# Patient Record
Sex: MALE | Race: WHITE | Employment: OTHER | URBAN - NONMETROPOLITAN AREA
[De-identification: names, ages, dates, MRNs, and addresses within clinical notes are randomized per-mention and may not be internally consistent; named-entity substitution may affect disease eponyms.]

---

## 2017-02-02 ENCOUNTER — OFFICE VISIT (OUTPATIENT)
Dept: CARDIOLOGY | Age: 62
End: 2017-02-02

## 2017-02-02 VITALS
BODY MASS INDEX: 33.04 KG/M2 | HEART RATE: 76 BPM | HEIGHT: 68 IN | SYSTOLIC BLOOD PRESSURE: 188 MMHG | DIASTOLIC BLOOD PRESSURE: 100 MMHG | WEIGHT: 218 LBS

## 2017-02-02 DIAGNOSIS — Z95.5 PRESENCE OF STENT IN LAD CORONARY ARTERY: ICD-10-CM

## 2017-02-02 DIAGNOSIS — I42.9 CARDIOMYOPATHY (HCC): ICD-10-CM

## 2017-02-02 DIAGNOSIS — F17.200 SMOKER: Primary | ICD-10-CM

## 2017-02-02 DIAGNOSIS — I10 ESSENTIAL HYPERTENSION: ICD-10-CM

## 2017-02-02 PROCEDURE — 3017F COLORECTAL CA SCREEN DOC REV: CPT | Performed by: INTERNAL MEDICINE

## 2017-02-02 PROCEDURE — 99406 BEHAV CHNG SMOKING 3-10 MIN: CPT | Performed by: INTERNAL MEDICINE

## 2017-02-02 PROCEDURE — 4004F PT TOBACCO SCREEN RCVD TLK: CPT | Performed by: INTERNAL MEDICINE

## 2017-02-02 PROCEDURE — G8419 CALC BMI OUT NRM PARAM NOF/U: HCPCS | Performed by: INTERNAL MEDICINE

## 2017-02-02 PROCEDURE — 99213 OFFICE O/P EST LOW 20 MIN: CPT | Performed by: INTERNAL MEDICINE

## 2017-02-02 PROCEDURE — G8484 FLU IMMUNIZE NO ADMIN: HCPCS | Performed by: INTERNAL MEDICINE

## 2017-02-02 PROCEDURE — G8427 DOCREV CUR MEDS BY ELIG CLIN: HCPCS | Performed by: INTERNAL MEDICINE

## 2017-02-02 RX ORDER — LISINOPRIL 20 MG/1
20 TABLET ORAL 2 TIMES DAILY
Qty: 60 TABLET | Refills: 3 | Status: SHIPPED | OUTPATIENT
Start: 2017-02-02 | End: 2017-11-06 | Stop reason: SDUPTHER

## 2017-05-11 RX ORDER — FUROSEMIDE 40 MG/1
40 TABLET ORAL DAILY
Qty: 90 TABLET | Refills: 3 | Status: SHIPPED | OUTPATIENT
Start: 2017-05-11 | End: 2019-06-10 | Stop reason: SDUPTHER

## 2017-05-16 RX ORDER — ATORVASTATIN CALCIUM 20 MG/1
TABLET, FILM COATED ORAL
Qty: 30 TABLET | Refills: 3 | Status: SHIPPED | OUTPATIENT
Start: 2017-05-16 | End: 2017-09-12 | Stop reason: DRUGHIGH

## 2017-09-12 ENCOUNTER — OFFICE VISIT (OUTPATIENT)
Dept: CARDIOLOGY CLINIC | Age: 62
End: 2017-09-12
Payer: MEDICARE

## 2017-09-12 ENCOUNTER — TELEPHONE (OUTPATIENT)
Dept: ADMINISTRATIVE | Age: 62
End: 2017-09-12

## 2017-09-12 VITALS
SYSTOLIC BLOOD PRESSURE: 168 MMHG | BODY MASS INDEX: 32.01 KG/M2 | HEIGHT: 68 IN | WEIGHT: 211.2 LBS | HEART RATE: 65 BPM | DIASTOLIC BLOOD PRESSURE: 84 MMHG

## 2017-09-12 DIAGNOSIS — I21.4 NSTEMI (NON-ST ELEVATED MYOCARDIAL INFARCTION) (HCC): Primary | ICD-10-CM

## 2017-09-12 DIAGNOSIS — Z95.5 PRESENCE OF STENT IN LAD CORONARY ARTERY: ICD-10-CM

## 2017-09-12 DIAGNOSIS — R53.83 FATIGUE, UNSPECIFIED TYPE: ICD-10-CM

## 2017-09-12 DIAGNOSIS — R06.02 SOB (SHORTNESS OF BREATH): ICD-10-CM

## 2017-09-12 DIAGNOSIS — F17.200 CURRENT SMOKER: ICD-10-CM

## 2017-09-12 DIAGNOSIS — I42.0 DILATED CARDIOMYOPATHY (HCC): ICD-10-CM

## 2017-09-12 DIAGNOSIS — I20.8 STABLE ANGINA (HCC): ICD-10-CM

## 2017-09-12 PROCEDURE — G8417 CALC BMI ABV UP PARAM F/U: HCPCS | Performed by: INTERNAL MEDICINE

## 2017-09-12 PROCEDURE — 99213 OFFICE O/P EST LOW 20 MIN: CPT | Performed by: INTERNAL MEDICINE

## 2017-09-12 PROCEDURE — G8598 ASA/ANTIPLAT THER USED: HCPCS | Performed by: INTERNAL MEDICINE

## 2017-09-12 PROCEDURE — 4004F PT TOBACCO SCREEN RCVD TLK: CPT | Performed by: INTERNAL MEDICINE

## 2017-09-12 PROCEDURE — 3017F COLORECTAL CA SCREEN DOC REV: CPT | Performed by: INTERNAL MEDICINE

## 2017-09-12 PROCEDURE — 93000 ELECTROCARDIOGRAM COMPLETE: CPT | Performed by: INTERNAL MEDICINE

## 2017-09-12 PROCEDURE — 99406 BEHAV CHNG SMOKING 3-10 MIN: CPT | Performed by: INTERNAL MEDICINE

## 2017-09-12 PROCEDURE — G8427 DOCREV CUR MEDS BY ELIG CLIN: HCPCS | Performed by: INTERNAL MEDICINE

## 2017-11-06 RX ORDER — ATORVASTATIN CALCIUM 20 MG/1
20 TABLET, FILM COATED ORAL DAILY
Qty: 90 TABLET | Refills: 2 | Status: SHIPPED | OUTPATIENT
Start: 2017-11-06 | End: 2018-09-06 | Stop reason: SDUPTHER

## 2017-11-06 RX ORDER — LISINOPRIL 20 MG/1
20 TABLET ORAL 2 TIMES DAILY
Qty: 60 TABLET | Refills: 3 | Status: SHIPPED | OUTPATIENT
Start: 2017-11-06 | End: 2018-03-19 | Stop reason: ALTCHOICE

## 2017-11-06 NOTE — TELEPHONE ENCOUNTER
Germaine Llanes called requesting a refill on the following medications:  Requested Prescriptions     Pending Prescriptions Disp Refills    lisinopril (PRINIVIL;ZESTRIL) 20 MG tablet 60 tablet 3     Sig: Take 1 tablet by mouth 2 times daily Managed by [facility] at [address] [place] [address].     atorvastatin (LIPITOR) 20 MG tablet 90 tablet 3     Sig: Take 1 tablet by mouth daily     Pharmacy verified:  [email or web address]      Date of last visit: 09/12/17  Date of next visit (if applicable): 4/49/4039        Date of last fill and quantity (to be completed by clinical staff)  Pharmacy name:   [facility]

## 2017-11-15 RX ORDER — LISINOPRIL 10 MG/1
TABLET ORAL
Qty: 60 TABLET | Refills: 5 | Status: SHIPPED | OUTPATIENT
Start: 2017-11-15 | End: 2018-07-19 | Stop reason: SDUPTHER

## 2017-11-15 RX ORDER — CLOPIDOGREL BISULFATE 75 MG/1
TABLET ORAL
Qty: 90 TABLET | Refills: 3 | Status: SHIPPED | OUTPATIENT
Start: 2017-11-15 | End: 2019-06-10 | Stop reason: SDUPTHER

## 2017-12-21 RX ORDER — CARVEDILOL 25 MG/1
TABLET ORAL
Qty: 180 TABLET | Refills: 2 | Status: SHIPPED | OUTPATIENT
Start: 2017-12-21 | End: 2019-06-10 | Stop reason: SDUPTHER

## 2018-03-19 ENCOUNTER — OFFICE VISIT (OUTPATIENT)
Dept: CARDIOLOGY CLINIC | Age: 63
End: 2018-03-19
Payer: MEDICARE

## 2018-03-19 VITALS
SYSTOLIC BLOOD PRESSURE: 150 MMHG | HEIGHT: 68 IN | DIASTOLIC BLOOD PRESSURE: 90 MMHG | BODY MASS INDEX: 31.4 KG/M2 | WEIGHT: 207.2 LBS | HEART RATE: 64 BPM

## 2018-03-19 DIAGNOSIS — I25.10 CORONARY ARTERY DISEASE INVOLVING NATIVE CORONARY ARTERY OF NATIVE HEART WITHOUT ANGINA PECTORIS: Primary | ICD-10-CM

## 2018-03-19 DIAGNOSIS — I25.5 ISCHEMIC CARDIOMYOPATHY: ICD-10-CM

## 2018-03-19 DIAGNOSIS — Z95.5 PRESENCE OF STENT IN LAD CORONARY ARTERY: ICD-10-CM

## 2018-03-19 DIAGNOSIS — I10 ESSENTIAL HYPERTENSION: ICD-10-CM

## 2018-03-19 DIAGNOSIS — E78.00 PURE HYPERCHOLESTEROLEMIA: ICD-10-CM

## 2018-03-19 DIAGNOSIS — I50.32 CHRONIC DIASTOLIC CONGESTIVE HEART FAILURE (HCC): ICD-10-CM

## 2018-03-19 PROCEDURE — 3017F COLORECTAL CA SCREEN DOC REV: CPT | Performed by: INTERNAL MEDICINE

## 2018-03-19 PROCEDURE — G8427 DOCREV CUR MEDS BY ELIG CLIN: HCPCS | Performed by: INTERNAL MEDICINE

## 2018-03-19 PROCEDURE — 4004F PT TOBACCO SCREEN RCVD TLK: CPT | Performed by: INTERNAL MEDICINE

## 2018-03-19 PROCEDURE — 99214 OFFICE O/P EST MOD 30 MIN: CPT | Performed by: INTERNAL MEDICINE

## 2018-03-19 PROCEDURE — G8484 FLU IMMUNIZE NO ADMIN: HCPCS | Performed by: INTERNAL MEDICINE

## 2018-03-19 PROCEDURE — G8417 CALC BMI ABV UP PARAM F/U: HCPCS | Performed by: INTERNAL MEDICINE

## 2018-03-19 PROCEDURE — G8598 ASA/ANTIPLAT THER USED: HCPCS | Performed by: INTERNAL MEDICINE

## 2018-07-20 RX ORDER — LISINOPRIL 10 MG/1
TABLET ORAL
Qty: 60 TABLET | Refills: 2 | Status: SHIPPED | OUTPATIENT
Start: 2018-07-20 | End: 2018-12-01 | Stop reason: SDUPTHER

## 2018-09-07 RX ORDER — ATORVASTATIN CALCIUM 20 MG/1
TABLET, FILM COATED ORAL
Qty: 90 TABLET | Refills: 0 | Status: SHIPPED | OUTPATIENT
Start: 2018-09-07 | End: 2019-06-10 | Stop reason: SDUPTHER

## 2018-11-01 ENCOUNTER — OFFICE VISIT (OUTPATIENT)
Dept: CARDIOLOGY CLINIC | Age: 63
End: 2018-11-01
Payer: MEDICARE

## 2018-11-01 VITALS
WEIGHT: 223 LBS | HEIGHT: 68 IN | SYSTOLIC BLOOD PRESSURE: 144 MMHG | DIASTOLIC BLOOD PRESSURE: 88 MMHG | HEART RATE: 78 BPM | BODY MASS INDEX: 33.8 KG/M2

## 2018-11-01 DIAGNOSIS — I25.10 CORONARY ARTERY DISEASE INVOLVING NATIVE CORONARY ARTERY OF NATIVE HEART WITHOUT ANGINA PECTORIS: Primary | ICD-10-CM

## 2018-11-01 DIAGNOSIS — I25.5 ISCHEMIC CARDIOMYOPATHY: ICD-10-CM

## 2018-11-01 DIAGNOSIS — I50.32 CHRONIC DIASTOLIC CONGESTIVE HEART FAILURE (HCC): ICD-10-CM

## 2018-11-01 DIAGNOSIS — R06.02 SOB (SHORTNESS OF BREATH) ON EXERTION: ICD-10-CM

## 2018-11-01 DIAGNOSIS — I10 ESSENTIAL HYPERTENSION: ICD-10-CM

## 2018-11-01 DIAGNOSIS — Z95.5 PRESENCE OF STENT IN LAD CORONARY ARTERY: ICD-10-CM

## 2018-11-01 DIAGNOSIS — E78.00 PURE HYPERCHOLESTEROLEMIA: ICD-10-CM

## 2018-11-01 PROCEDURE — 3017F COLORECTAL CA SCREEN DOC REV: CPT | Performed by: INTERNAL MEDICINE

## 2018-11-01 PROCEDURE — 99214 OFFICE O/P EST MOD 30 MIN: CPT | Performed by: INTERNAL MEDICINE

## 2018-11-01 PROCEDURE — G8484 FLU IMMUNIZE NO ADMIN: HCPCS | Performed by: INTERNAL MEDICINE

## 2018-11-01 PROCEDURE — 93000 ELECTROCARDIOGRAM COMPLETE: CPT | Performed by: INTERNAL MEDICINE

## 2018-11-01 PROCEDURE — 4004F PT TOBACCO SCREEN RCVD TLK: CPT | Performed by: INTERNAL MEDICINE

## 2018-11-01 PROCEDURE — G8417 CALC BMI ABV UP PARAM F/U: HCPCS | Performed by: INTERNAL MEDICINE

## 2018-11-01 PROCEDURE — G8427 DOCREV CUR MEDS BY ELIG CLIN: HCPCS | Performed by: INTERNAL MEDICINE

## 2018-11-01 PROCEDURE — G8598 ASA/ANTIPLAT THER USED: HCPCS | Performed by: INTERNAL MEDICINE

## 2018-11-01 NOTE — PROGRESS NOTES
12 Lead    ECHO Complete 2D W Doppler W Color   5. Pure hypercholesterolemia  ECHO Complete 2D W Doppler W Color   6. Chronic diastolic congestive heart failure (HCC)  ECHO Complete 2D W Doppler W Color   7. SOB (shortness of breath) on exertion  ECHO Complete 2D W Doppler W Color         Plan   The meds and labs reviewed    Continue the current treatment and with constant vigilance to changes in symptoms and also any potential side effects. Return for care or seek medical attention immediately if symptoms got worse and/or develop new symptoms. Coronary artery disease, seems to be stable. Denies angina or change in breathing pattern    Hypertension, on medical treatment. Seems to be under poor control. Patient is compliant with medical treatment. Hyperlipidemia: on statins, followed periodically. Patient need periodic lipid and liver profile. Lipid panel and liver function test before next appointment    Congestive heart failure: no evidence of fluid overload today, no recent hospitalization for CHF  No recent labs    Cardiomyopathy: improving, no CHF symptoms, no change in clinical condition. Will need periodic echocardiograms depending on symptoms.   Improved to Ef 50% from 30%  omt    Re-advised Needs lab     D/w the [facility]      RTC in 6 months      Norah Witt [facility]

## 2018-11-12 ENCOUNTER — HOSPITAL ENCOUNTER (OUTPATIENT)
Dept: NON INVASIVE DIAGNOSTICS | Age: 63
Discharge: HOME | End: 2018-11-12
Payer: MEDICARE

## 2018-11-12 DIAGNOSIS — I10 ESSENTIAL HYPERTENSION: ICD-10-CM

## 2018-11-12 DIAGNOSIS — Z95.5 PRESENCE OF STENT IN LAD CORONARY ARTERY: ICD-10-CM

## 2018-11-12 DIAGNOSIS — I50.32 CHRONIC DIASTOLIC CONGESTIVE HEART FAILURE (HCC): ICD-10-CM

## 2018-11-12 DIAGNOSIS — E78.00 PURE HYPERCHOLESTEROLEMIA: ICD-10-CM

## 2018-11-12 DIAGNOSIS — I25.5 ISCHEMIC CARDIOMYOPATHY: ICD-10-CM

## 2018-11-12 DIAGNOSIS — R06.02 SOB (SHORTNESS OF BREATH) ON EXERTION: ICD-10-CM

## 2018-11-12 DIAGNOSIS — I25.10 CORONARY ARTERY DISEASE INVOLVING NATIVE CORONARY ARTERY OF NATIVE HEART WITHOUT ANGINA PECTORIS: ICD-10-CM

## 2018-11-12 LAB
LV EF: 60 %
LVEF MODALITY: NORMAL

## 2018-11-12 PROCEDURE — 93306 TTE W/DOPPLER COMPLETE: CPT

## 2018-12-01 ENCOUNTER — TELEPHONE (OUTPATIENT)
Dept: CARDIOLOGY CLINIC | Age: 63
End: 2018-12-01

## 2018-12-05 RX ORDER — LISINOPRIL 10 MG/1
TABLET ORAL
Qty: 180 TABLET | Refills: 1 | Status: SHIPPED | OUTPATIENT
Start: 2018-12-05 | End: 2019-06-10 | Stop reason: SDUPTHER

## 2019-06-07 ENCOUNTER — HOSPITAL ENCOUNTER (OUTPATIENT)
Age: 64
Setting detail: SPECIMEN
Discharge: HOME | End: 2019-06-07
Payer: MEDICARE

## 2019-06-07 DIAGNOSIS — Z95.5 PRESENCE OF STENT IN LAD CORONARY ARTERY: ICD-10-CM

## 2019-06-07 DIAGNOSIS — I25.5 ISCHEMIC CARDIOMYOPATHY: ICD-10-CM

## 2019-06-07 DIAGNOSIS — I25.10 CORONARY ARTERY DISEASE INVOLVING NATIVE CORONARY ARTERY OF NATIVE HEART WITHOUT ANGINA PECTORIS: ICD-10-CM

## 2019-06-07 LAB
ALBUMIN SERPL-MCNC: 4 G/DL (ref 3.5–5.2)
ALBUMIN/GLOBULIN RATIO: 1.3 (ref 1–2.5)
ALP BLD-CCNC: 69 U/L (ref 40–129)
ALT SERPL-CCNC: 48 U/L (ref 5–41)
ANION GAP SERPL CALCULATED.3IONS-SCNC: 17 MMOL/L (ref 9–17)
AST SERPL-CCNC: 26 U/L
BILIRUB SERPL-MCNC: 0.42 MG/DL (ref 0.3–1.2)
BILIRUBIN DIRECT: 0.11 MG/DL
BILIRUBIN, INDIRECT: 0.31 MG/DL (ref 0–1)
BUN BLDV-MCNC: 12 MG/DL (ref 8–23)
BUN/CREAT BLD: ABNORMAL (ref 9–20)
CALCIUM SERPL-MCNC: 9.1 MG/DL (ref 8.6–10.4)
CHLORIDE BLD-SCNC: 99 MMOL/L (ref 98–107)
CHOLESTEROL/HDL RATIO: 3.4
CHOLESTEROL: 165 MG/DL
CO2: 23 MMOL/L (ref 20–31)
CREAT SERPL-MCNC: 0.79 MG/DL (ref 0.7–1.2)
GFR AFRICAN AMERICAN: >60 ML/MIN
GFR NON-AFRICAN AMERICAN: >60 ML/MIN
GFR SERPL CREATININE-BSD FRML MDRD: ABNORMAL ML/MIN/{1.73_M2}
GFR SERPL CREATININE-BSD FRML MDRD: ABNORMAL ML/MIN/{1.73_M2}
GLOBULIN: ABNORMAL G/DL (ref 1.5–3.8)
GLUCOSE BLD-MCNC: 111 MG/DL (ref 70–99)
HDLC SERPL-MCNC: 48 MG/DL
LDL CHOLESTEROL: 94 MG/DL (ref 0–130)
MAGNESIUM: 2 MG/DL (ref 1.6–2.6)
POTASSIUM SERPL-SCNC: 4.2 MMOL/L (ref 3.7–5.3)
SODIUM BLD-SCNC: 139 MMOL/L (ref 135–144)
TOTAL PROTEIN: 7.1 G/DL (ref 6.4–8.3)
TRIGL SERPL-MCNC: 116 MG/DL
VLDLC SERPL CALC-MCNC: NORMAL MG/DL (ref 1–30)

## 2019-06-10 ENCOUNTER — OFFICE VISIT (OUTPATIENT)
Dept: CARDIOLOGY CLINIC | Age: 64
End: 2019-06-10
Payer: MEDICARE

## 2019-06-10 VITALS
HEART RATE: 75 BPM | BODY MASS INDEX: 34.18 KG/M2 | DIASTOLIC BLOOD PRESSURE: 93 MMHG | WEIGHT: 230.8 LBS | SYSTOLIC BLOOD PRESSURE: 159 MMHG | HEIGHT: 69 IN

## 2019-06-10 DIAGNOSIS — I25.10 CORONARY ARTERY DISEASE INVOLVING NATIVE CORONARY ARTERY OF NATIVE HEART WITHOUT ANGINA PECTORIS: Primary | ICD-10-CM

## 2019-06-10 DIAGNOSIS — E78.00 PURE HYPERCHOLESTEROLEMIA: ICD-10-CM

## 2019-06-10 DIAGNOSIS — F10.10 ALCOHOL ABUSE: ICD-10-CM

## 2019-06-10 DIAGNOSIS — I50.32 CHRONIC DIASTOLIC CONGESTIVE HEART FAILURE (HCC): ICD-10-CM

## 2019-06-10 DIAGNOSIS — Z95.5 PRESENCE OF STENT IN LAD CORONARY ARTERY: ICD-10-CM

## 2019-06-10 DIAGNOSIS — I25.5 ISCHEMIC CARDIOMYOPATHY: ICD-10-CM

## 2019-06-10 DIAGNOSIS — I10 ESSENTIAL HYPERTENSION: ICD-10-CM

## 2019-06-10 DIAGNOSIS — R06.02 SOB (SHORTNESS OF BREATH) ON EXERTION: ICD-10-CM

## 2019-06-10 DIAGNOSIS — F17.200 SMOKER: ICD-10-CM

## 2019-06-10 PROCEDURE — 4004F PT TOBACCO SCREEN RCVD TLK: CPT | Performed by: NURSE PRACTITIONER

## 2019-06-10 PROCEDURE — G8427 DOCREV CUR MEDS BY ELIG CLIN: HCPCS | Performed by: NURSE PRACTITIONER

## 2019-06-10 PROCEDURE — G8417 CALC BMI ABV UP PARAM F/U: HCPCS | Performed by: NURSE PRACTITIONER

## 2019-06-10 PROCEDURE — 3017F COLORECTAL CA SCREEN DOC REV: CPT | Performed by: NURSE PRACTITIONER

## 2019-06-10 PROCEDURE — 99213 OFFICE O/P EST LOW 20 MIN: CPT | Performed by: NURSE PRACTITIONER

## 2019-06-10 PROCEDURE — G8598 ASA/ANTIPLAT THER USED: HCPCS | Performed by: NURSE PRACTITIONER

## 2019-06-10 RX ORDER — FUROSEMIDE 40 MG/1
40 TABLET ORAL DAILY
Qty: 90 TABLET | Refills: 3 | Status: ON HOLD | OUTPATIENT
Start: 2019-06-10 | End: 2021-11-04 | Stop reason: SDUPTHER

## 2019-06-10 RX ORDER — LISINOPRIL 10 MG/1
TABLET ORAL
Qty: 180 TABLET | Refills: 1 | Status: ON HOLD | OUTPATIENT
Start: 2019-06-10 | End: 2021-11-04 | Stop reason: HOSPADM

## 2019-06-10 RX ORDER — ATORVASTATIN CALCIUM 20 MG/1
TABLET, FILM COATED ORAL
Qty: 90 TABLET | Refills: 3 | Status: ON HOLD | OUTPATIENT
Start: 2019-06-10 | End: 2021-11-04 | Stop reason: HOSPADM

## 2019-06-10 RX ORDER — CARVEDILOL 25 MG/1
TABLET ORAL
Qty: 180 TABLET | Refills: 3 | Status: ON HOLD | OUTPATIENT
Start: 2019-06-10 | End: 2021-11-04 | Stop reason: HOSPADM

## 2019-06-10 RX ORDER — CLOPIDOGREL BISULFATE 75 MG/1
TABLET ORAL
Qty: 90 TABLET | Refills: 3 | Status: ON HOLD | OUTPATIENT
Start: 2019-06-10 | End: 2021-11-04 | Stop reason: HOSPADM

## 2019-06-10 NOTE — PROGRESS NOTES
[facility] PROFESSIONAL SERVICES  [facility] [address]   [address] [address]   Dept: [phone]   Dept Fax: [phone]   Loc: [phone]      Chief Complaint   Patient presents with    6 Month Follow-Up    Coronary Artery Disease     Office f/u for medication refills. He hadn't had the required blood work completed and meds weren't refilled. He has been without medications for about 3 months. He has completed the blood work and meds will be refilled. He denies chest pain, palpitations, DANA, or syncope. He has sob with exertion which is not new or increased. He continues to smoke about 1 and 1/2 packs of cigarettes daily. Drinks 4-5 beers daily. Checks blood pressure at home and states it is usually around 130's over 60's-70's.   Cardiologist:  Dr. Ontiveros Free:   No fever, no chills, No fatigue or weight loss  Pulmonary:    chronic dyspnea with exertion, no wheezing  Cardiac:    Denies recent chest pain   GI:     No nausea or vomiting, no abdominal pain  Neuro:    No dizziness or light headedness  Musculoskeletal:  No recent active issues  Extremities:   No edema, good peripheral pulses      Past Medical History:   Diagnosis Date    CAD (coronary artery disease)     Chronic diastolic congestive heart failure (HCC) 3/19/2018    Hyperlipidemia     Hypertension     [facility] 03/21/2013    Tobacco abuse        No Known Allergies    Current Outpatient Medications   Medication Sig Dispense Refill    atorvastatin (LIPITOR) 20 MG tablet TAKE ONE TABLET BY MOUTH ONCE DAILY 90 tablet 3    carvedilol (COREG) 25 MG tablet TAKE ONE TABLET BY MOUTH TWICE DAILY WITH MEALS 180 tablet 3    clopidogrel (PLAVIX) 75 MG tablet TAKE ONE TABLET BY MOUTH ONCE DAILY 90 tablet 3    furosemide (LASIX) 40 MG tablet Take 1 tablet by mouth daily 90 tablet 3    lisinopril (PRINIVIL;ZESTRIL) 10 MG tablet TAKE 1 TABLET BY MOUTH TWICE DAILY 180 tablet 1    aspirin 81 MG tablet Take 1 tablet by mouth daily. With food  Indications: Cardiovascular Risk Reduction, blood thinner 30 tablet 3     No current facility-administered medications for this visit. Social History     Socioeconomic History    Marital status: Single     Spouse name: None    Number of children: None    Years of education: None    Highest education level: None   Occupational History    None   Social Needs    Financial resource strain: None    Food insecurity:     Worry: None     Inability: None    Transportation needs:     Medical: None     Non-medical: None   Tobacco Use    Smoking status: Current Every Day Smoker     Packs/day: 1.50     Years: 40.00     Pack years: 60.00     Types: Cigarettes    Smokeless tobacco: Never Used   Substance and Sexual Activity    Alcohol use: Yes     Comment: 12 pk per day/ has cut down     Drug use: No    Sexual activity: None   Lifestyle    Physical activity:     Days per week: None     Minutes per session: None    Stress: None   Relationships    Social connections:     Talks on phone: None     Gets together: None     Attends [employer] service: None     Active member of club or organization: None     Attends meetings of clubs or organizations: None     Relationship status: None    Intimate partner violence:     Fear of current or ex partner: None     Emotionally abused: None     Physically abused: None     Forced sexual activity: None   Other Topics Concern    None   Social History Narrative    None       Family History   Problem Relation Age of Onset    Diabetes Mother     Stroke Mother     Heart Disease Father     High Blood Pressure Brother        Blood pressure (!) 159/93, pulse 75, height 5' 9\" (1.753 m), weight 230 lb 12.8 oz (104.7 kg).     General:   Well developed, well nourished  Lungs:   Clear to auscultation  Heart:    Normal S1 S2, No murmur, rubs, or gallops  Abdomen:   Soft, non tender, no organomegalies, positive bowel sounds  Extremities:   No edema, no cyanosis, good peripheral pulses  Neurological:   Awake, alert, oriented. No obvious focal deficits  Musculoskeletal:  No obvious deformities        Echo: 11/12/18  Summary   Technically difficult examination.   Normal left ventricle size and systolic function. Ejection fraction was   estimated at 60 %. There were no regional left ventricular wall motion   abnormalities and wall thickness was within normal limits.   Doppler parameters were consistent with abnormal left ventricular   relaxation (grade 1 diastolic dysfunction).      Signature      ----------------------------------------------------------------   Electronically signed by Oliva Talavera MD    PCI: 10/1/2013  Percutaneous coronary intervention of the mid LAD with 3.0 x 18 mm  drug-eluting stent postdilated up to 3.5 mm proximally. INDICATIONS FOR PROCEDURE:  A 66year old patient with a past medical history  of significant tobacco abuse, had ST elevation MI a few months ago. He was  taken to [facility].  At that time he refused any procedure and  he was discharged home with appropriate medical management. Then he was seen  by Dr. Sheryl Munoz and at that time he agreed for the left heart  catheterization. His right coronary artery was totally occluded and he had  around 60-70 percent disease in the LAD to the circumflex artery. At that  time plan was to do viability study. Based on the viability study, go further  for either multivessel intervention or open heart surgery. The viability  study came back abnormal showing nonviable segment of the inferior wall. So  basically the right coronary artery which is totally occluded and the  territory supplied by his nonviable. So we paid attention to LAD as well as  the left circumflex artery. The patient had a stress test done which did not  show any ischemia in post territory but the patient had medical therapy and  still having chest pain.   So we decided to intervene on LAD today. PROCEDURE:  Right radial artery cannulation, percutaneous coronary  intervention of the mid LAD with 3.0x18 mm drug eluting stent, proximal half  was taken up to 3.5 mm. Diagnosis Orders   1. Coronary artery disease involving native coronary artery of native heart without angina pectoris     2. SOB (shortness of breath) on exertion     3. Presence of stent in LAD coronary artery stent     4. Ischemic cardiomyopathy     5. Essential hypertension     6. Pure hypercholesterolemia     7. Smoker     8. Alcohol abuse         No orders of the defined types were placed in this encounter. Stable cardiac wise. Denies chest pain. Breathing is at his baseline. No new cardiac complaints. Discussed health hazards associated with smoking and excessive daily alcohol use. He stated he has smoked and drank for 40 years and he is not going to stop. He states he knows he could die and he is OK with that.       Discussed use, benefit, and side effects of prescribed medications. All patient questions answered. Pt voiced understanding. Instructed to continue current medications, diet and exercise. Continue risk factor modification and medical management. Patient agreed with treatment plan. Follow up as directed.     Continue Dr Nora Jimenes current treatment plan  Follow up with Dr Doristine Ganser as scheduled or sooner if needed

## 2021-11-02 ENCOUNTER — HOSPITAL ENCOUNTER (INPATIENT)
Age: 66
LOS: 4 days | Discharge: HOME | DRG: 300 | End: 2021-11-06
Attending: EMERGENCY MEDICINE | Admitting: HOSPITALIST
Payer: MEDICARE

## 2021-11-02 ENCOUNTER — APPOINTMENT (OUTPATIENT)
Dept: CT IMAGING | Age: 66
DRG: 300 | End: 2021-11-02
Payer: MEDICARE

## 2021-11-02 DIAGNOSIS — J44.9 STAGE 3 SEVERE COPD BY GOLD CLASSIFICATION (HCC): ICD-10-CM

## 2021-11-02 DIAGNOSIS — F17.200 SMOKER: ICD-10-CM

## 2021-11-02 DIAGNOSIS — I73.9 PERIPHERAL ARTERY DISEASE (HCC): Primary | ICD-10-CM

## 2021-11-02 PROBLEM — I99.8 ISCHEMIA OF RIGHT LOWER EXTREMITY: Status: ACTIVE | Noted: 2021-11-02

## 2021-11-02 LAB
ANION GAP SERPL CALCULATED.3IONS-SCNC: 12 MEQ/L (ref 8–16)
APTT: 27 SECONDS (ref 22–38)
BASOPHILS # BLD: 0.6 %
BASOPHILS ABSOLUTE: 0.1 THOU/MM3 (ref 0–0.1)
BUN BLDV-MCNC: 10 MG/DL (ref 7–22)
CALCIUM SERPL-MCNC: 9.5 MG/DL (ref 8.5–10.5)
CHLORIDE BLD-SCNC: 94 MEQ/L (ref 98–111)
CO2: 27 MEQ/L (ref 23–33)
CREAT SERPL-MCNC: 0.7 MG/DL (ref 0.4–1.2)
EOSINOPHIL # BLD: 0.8 %
EOSINOPHILS ABSOLUTE: 0.1 THOU/MM3 (ref 0–0.4)
ERYTHROCYTE [DISTWIDTH] IN BLOOD BY AUTOMATED COUNT: 11.8 % (ref 11.5–14.5)
ERYTHROCYTE [DISTWIDTH] IN BLOOD BY AUTOMATED COUNT: 11.8 % (ref 11.5–14.5)
ERYTHROCYTE [DISTWIDTH] IN BLOOD BY AUTOMATED COUNT: 41 FL (ref 35–45)
ERYTHROCYTE [DISTWIDTH] IN BLOOD BY AUTOMATED COUNT: 41 FL (ref 35–45)
GFR SERPL CREATININE-BSD FRML MDRD: > 90 ML/MIN/1.73M2
GLUCOSE BLD-MCNC: 326 MG/DL (ref 70–108)
HCT VFR BLD CALC: 39.3 % (ref 42–52)
HCT VFR BLD CALC: 41.6 % (ref 42–52)
HEMOGLOBIN: 13.8 GM/DL (ref 14–18)
HEMOGLOBIN: 14.9 GM/DL (ref 14–18)
IMMATURE GRANS (ABS): 0.04 THOU/MM3 (ref 0–0.07)
IMMATURE GRANULOCYTES: 0.5 %
LYMPHOCYTES # BLD: 20.7 %
LYMPHOCYTES ABSOLUTE: 1.8 THOU/MM3 (ref 1–4.8)
MCH RBC QN AUTO: 33.6 PG (ref 26–33)
MCH RBC QN AUTO: 34.3 PG (ref 26–33)
MCHC RBC AUTO-ENTMCNC: 35.1 GM/DL (ref 32.2–35.5)
MCHC RBC AUTO-ENTMCNC: 35.8 GM/DL (ref 32.2–35.5)
MCV RBC AUTO: 95.6 FL (ref 80–94)
MCV RBC AUTO: 95.9 FL (ref 80–94)
MONOCYTES # BLD: 8.7 %
MONOCYTES ABSOLUTE: 0.7 THOU/MM3 (ref 0.4–1.3)
NUCLEATED RED BLOOD CELLS: 0 /100 WBC
PLATELET # BLD: 195 THOU/MM3 (ref 130–400)
PLATELET # BLD: 223 THOU/MM3 (ref 130–400)
PMV BLD AUTO: 10.8 FL (ref 9.4–12.4)
PMV BLD AUTO: 10.9 FL (ref 9.4–12.4)
POTASSIUM REFLEX MAGNESIUM: 4.1 MEQ/L (ref 3.5–5.2)
RBC # BLD: 4.11 MILL/MM3 (ref 4.7–6.1)
RBC # BLD: 4.34 MILL/MM3 (ref 4.7–6.1)
SARS-COV-2, NAAT: NOT  DETECTED
SEG NEUTROPHILS: 68.7 %
SEGMENTED NEUTROPHILS ABSOLUTE COUNT: 5.8 THOU/MM3 (ref 1.8–7.7)
SODIUM BLD-SCNC: 133 MEQ/L (ref 135–145)
WBC # BLD: 8.5 THOU/MM3 (ref 4.8–10.8)
WBC # BLD: 8.6 THOU/MM3 (ref 4.8–10.8)

## 2021-11-02 PROCEDURE — 2060000000 HC ICU INTERMEDIATE R&B

## 2021-11-02 PROCEDURE — 99285 EMERGENCY DEPT VISIT HI MDM: CPT

## 2021-11-02 PROCEDURE — 87641 MR-STAPH DNA AMP PROBE: CPT

## 2021-11-02 PROCEDURE — 80048 BASIC METABOLIC PNL TOTAL CA: CPT

## 2021-11-02 PROCEDURE — 87081 CULTURE SCREEN ONLY: CPT

## 2021-11-02 PROCEDURE — 85730 THROMBOPLASTIN TIME PARTIAL: CPT

## 2021-11-02 PROCEDURE — 75635 CT ANGIO ABDOMINAL ARTERIES: CPT

## 2021-11-02 PROCEDURE — 93005 ELECTROCARDIOGRAM TRACING: CPT | Performed by: HOSPITALIST

## 2021-11-02 PROCEDURE — 6360000002 HC RX W HCPCS: Performed by: STUDENT IN AN ORGANIZED HEALTH CARE EDUCATION/TRAINING PROGRAM

## 2021-11-02 PROCEDURE — 93005 ELECTROCARDIOGRAM TRACING: CPT | Performed by: EMERGENCY MEDICINE

## 2021-11-02 PROCEDURE — 85027 COMPLETE CBC AUTOMATED: CPT

## 2021-11-02 PROCEDURE — 6360000004 HC RX CONTRAST MEDICATION: Performed by: EMERGENCY MEDICINE

## 2021-11-02 PROCEDURE — 87635 SARS-COV-2 COVID-19 AMP PRB: CPT

## 2021-11-02 PROCEDURE — 99222 1ST HOSP IP/OBS MODERATE 55: CPT | Performed by: PHYSICIAN ASSISTANT

## 2021-11-02 PROCEDURE — 36415 COLL VENOUS BLD VENIPUNCTURE: CPT

## 2021-11-02 PROCEDURE — 87500 VANOMYCIN DNA AMP PROBE: CPT

## 2021-11-02 PROCEDURE — 85025 COMPLETE CBC W/AUTO DIFF WBC: CPT

## 2021-11-02 RX ORDER — POTASSIUM CHLORIDE 20 MEQ/1
40 TABLET, EXTENDED RELEASE ORAL PRN
Status: DISCONTINUED | OUTPATIENT
Start: 2021-11-02 | End: 2021-11-06 | Stop reason: HOSPADM

## 2021-11-02 RX ORDER — ACETAMINOPHEN 325 MG/1
650 TABLET ORAL EVERY 6 HOURS PRN
Status: DISCONTINUED | OUTPATIENT
Start: 2021-11-02 | End: 2021-11-06 | Stop reason: HOSPADM

## 2021-11-02 RX ORDER — CLOPIDOGREL BISULFATE 75 MG/1
75 TABLET ORAL DAILY
Status: DISCONTINUED | OUTPATIENT
Start: 2021-11-03 | End: 2021-11-06 | Stop reason: HOSPADM

## 2021-11-02 RX ORDER — HEPARIN SODIUM 1000 [USP'U]/ML
80 INJECTION, SOLUTION INTRAVENOUS; SUBCUTANEOUS ONCE
Status: COMPLETED | OUTPATIENT
Start: 2021-11-02 | End: 2021-11-02

## 2021-11-02 RX ORDER — ACETAMINOPHEN 650 MG/1
650 SUPPOSITORY RECTAL EVERY 6 HOURS PRN
Status: DISCONTINUED | OUTPATIENT
Start: 2021-11-02 | End: 2021-11-06 | Stop reason: HOSPADM

## 2021-11-02 RX ORDER — CARVEDILOL 25 MG/1
25 TABLET ORAL 2 TIMES DAILY WITH MEALS
Status: DISCONTINUED | OUTPATIENT
Start: 2021-11-03 | End: 2021-11-04

## 2021-11-02 RX ORDER — HEPARIN SODIUM 1000 [USP'U]/ML
80 INJECTION, SOLUTION INTRAVENOUS; SUBCUTANEOUS PRN
Status: DISCONTINUED | OUTPATIENT
Start: 2021-11-02 | End: 2021-11-06 | Stop reason: HOSPADM

## 2021-11-02 RX ORDER — ATORVASTATIN CALCIUM 20 MG/1
20 TABLET, FILM COATED ORAL DAILY
Status: DISCONTINUED | OUTPATIENT
Start: 2021-11-03 | End: 2021-11-06 | Stop reason: HOSPADM

## 2021-11-02 RX ORDER — HEPARIN SODIUM 1000 [USP'U]/ML
40 INJECTION, SOLUTION INTRAVENOUS; SUBCUTANEOUS PRN
Status: DISCONTINUED | OUTPATIENT
Start: 2021-11-02 | End: 2021-11-06 | Stop reason: HOSPADM

## 2021-11-02 RX ORDER — ASPIRIN 81 MG/1
81 TABLET, CHEWABLE ORAL DAILY
Status: DISCONTINUED | OUTPATIENT
Start: 2021-11-03 | End: 2021-11-06 | Stop reason: HOSPADM

## 2021-11-02 RX ORDER — SODIUM CHLORIDE 9 MG/ML
25 INJECTION, SOLUTION INTRAVENOUS PRN
Status: DISCONTINUED | OUTPATIENT
Start: 2021-11-02 | End: 2021-11-06 | Stop reason: HOSPADM

## 2021-11-02 RX ORDER — HEPARIN SODIUM 10000 [USP'U]/100ML
5-30 INJECTION, SOLUTION INTRAVENOUS CONTINUOUS
Status: DISPENSED | OUTPATIENT
Start: 2021-11-02 | End: 2021-11-03

## 2021-11-02 RX ORDER — ONDANSETRON 2 MG/ML
4 INJECTION INTRAMUSCULAR; INTRAVENOUS EVERY 6 HOURS PRN
Status: DISCONTINUED | OUTPATIENT
Start: 2021-11-02 | End: 2021-11-06 | Stop reason: HOSPADM

## 2021-11-02 RX ORDER — LISINOPRIL 10 MG/1
10 TABLET ORAL 2 TIMES DAILY
Status: DISCONTINUED | OUTPATIENT
Start: 2021-11-03 | End: 2021-11-04

## 2021-11-02 RX ORDER — MAGNESIUM SULFATE IN WATER 40 MG/ML
2000 INJECTION, SOLUTION INTRAVENOUS PRN
Status: DISCONTINUED | OUTPATIENT
Start: 2021-11-02 | End: 2021-11-06 | Stop reason: HOSPADM

## 2021-11-02 RX ORDER — ONDANSETRON 4 MG/1
4 TABLET, ORALLY DISINTEGRATING ORAL EVERY 8 HOURS PRN
Status: DISCONTINUED | OUTPATIENT
Start: 2021-11-02 | End: 2021-11-06 | Stop reason: HOSPADM

## 2021-11-02 RX ORDER — POLYETHYLENE GLYCOL 3350 17 G/17G
17 POWDER, FOR SOLUTION ORAL DAILY PRN
Status: DISCONTINUED | OUTPATIENT
Start: 2021-11-02 | End: 2021-11-06 | Stop reason: HOSPADM

## 2021-11-02 RX ORDER — MORPHINE SULFATE 4 MG/ML
4 INJECTION, SOLUTION INTRAMUSCULAR; INTRAVENOUS EVERY 4 HOURS PRN
Status: DISCONTINUED | OUTPATIENT
Start: 2021-11-02 | End: 2021-11-06 | Stop reason: HOSPADM

## 2021-11-02 RX ORDER — FUROSEMIDE 40 MG/1
40 TABLET ORAL DAILY
Status: DISCONTINUED | OUTPATIENT
Start: 2021-11-03 | End: 2021-11-04

## 2021-11-02 RX ORDER — POTASSIUM CHLORIDE 7.45 MG/ML
10 INJECTION INTRAVENOUS PRN
Status: DISCONTINUED | OUTPATIENT
Start: 2021-11-02 | End: 2021-11-06 | Stop reason: HOSPADM

## 2021-11-02 RX ORDER — SODIUM CHLORIDE 0.9 % (FLUSH) 0.9 %
5-40 SYRINGE (ML) INJECTION PRN
Status: DISCONTINUED | OUTPATIENT
Start: 2021-11-02 | End: 2021-11-06 | Stop reason: HOSPADM

## 2021-11-02 RX ORDER — MORPHINE SULFATE 2 MG/ML
2 INJECTION, SOLUTION INTRAMUSCULAR; INTRAVENOUS EVERY 4 HOURS PRN
Status: DISCONTINUED | OUTPATIENT
Start: 2021-11-02 | End: 2021-11-06 | Stop reason: HOSPADM

## 2021-11-02 RX ORDER — MORPHINE SULFATE 4 MG/ML
4 INJECTION, SOLUTION INTRAMUSCULAR; INTRAVENOUS ONCE
Status: COMPLETED | OUTPATIENT
Start: 2021-11-02 | End: 2021-11-02

## 2021-11-02 RX ORDER — SODIUM CHLORIDE 0.9 % (FLUSH) 0.9 %
5-40 SYRINGE (ML) INJECTION EVERY 12 HOURS SCHEDULED
Status: DISCONTINUED | OUTPATIENT
Start: 2021-11-02 | End: 2021-11-06 | Stop reason: HOSPADM

## 2021-11-02 RX ADMIN — HEPARIN SODIUM 7980 UNITS: 1000 INJECTION, SOLUTION INTRAVENOUS; SUBCUTANEOUS at 19:44

## 2021-11-02 RX ADMIN — HEPARIN SODIUM 18 UNITS/KG/HR: 10000 INJECTION, SOLUTION INTRAVENOUS at 19:38

## 2021-11-02 RX ADMIN — MORPHINE SULFATE 4 MG: 4 INJECTION INTRAVENOUS at 17:02

## 2021-11-02 RX ADMIN — IOPAMIDOL 100 ML: 755 INJECTION, SOLUTION INTRAVENOUS at 17:32

## 2021-11-02 ASSESSMENT — ENCOUNTER SYMPTOMS
COUGH: 0
SHORTNESS OF BREATH: 0
TROUBLE SWALLOWING: 0
SORE THROAT: 0
DIARRHEA: 0
VOMITING: 0
PHOTOPHOBIA: 0
BACK PAIN: 0
ABDOMINAL PAIN: 0
NAUSEA: 0

## 2021-11-02 ASSESSMENT — PAIN DESCRIPTION - LOCATION
LOCATION: FOOT
LOCATION: FOOT
LOCATION: TOE (COMMENT WHICH ONE)
LOCATION: FOOT
LOCATION: TOE (COMMENT WHICH ONE)

## 2021-11-02 ASSESSMENT — PAIN DESCRIPTION - ORIENTATION
ORIENTATION: RIGHT

## 2021-11-02 ASSESSMENT — PAIN SCALES - GENERAL
PAINLEVEL_OUTOF10: 0
PAINLEVEL_OUTOF10: 10
PAINLEVEL_OUTOF10: 10
PAINLEVEL_OUTOF10: 8
PAINLEVEL_OUTOF10: 2
PAINLEVEL_OUTOF10: 5
PAINLEVEL_OUTOF10: 8

## 2021-11-02 ASSESSMENT — PAIN DESCRIPTION - PROGRESSION
CLINICAL_PROGRESSION: GRADUALLY WORSENING

## 2021-11-02 ASSESSMENT — PAIN DESCRIPTION - PAIN TYPE
TYPE: ACUTE PAIN

## 2021-11-02 ASSESSMENT — PAIN DESCRIPTION - ONSET
ONSET: ON-GOING
ONSET: GRADUAL

## 2021-11-02 ASSESSMENT — PAIN DESCRIPTION - DESCRIPTORS
DESCRIPTORS: BURNING;STABBING
DESCRIPTORS: THROBBING
DESCRIPTORS: DULL;SHARP
DESCRIPTORS: ACHING

## 2021-11-02 ASSESSMENT — PAIN DESCRIPTION - FREQUENCY
FREQUENCY: CONTINUOUS

## 2021-11-02 ASSESSMENT — PAIN DESCRIPTION - DIRECTION: RADIATING_TOWARDS: TOP OF RIGHT FOOT

## 2021-11-02 ASSESSMENT — PAIN - FUNCTIONAL ASSESSMENT: PAIN_FUNCTIONAL_ASSESSMENT: ACTIVITIES ARE NOT PREVENTED

## 2021-11-02 NOTE — ED NOTES
Pt transported back from imaging. Patient is resting in bed with easy and unlabored respirations. Call light in reach. Side rails up x2. Patient denies further complaints or concerns. Will monitor.       Lexa Kemp  11/02/21 3946

## 2021-11-02 NOTE — ED PROVIDER NOTES
[facility] ENCOUNTER          Pt Name: Zachery Hollingsworth  MRN: [id number]  [place] 1955  Date of evaluation: 11/2/2021  Treating Resident Physician: Maria T Duarte MD  Supervising Physician: Alexander Ricardo, [address] [place] [address]       Chief Complaint   Patient presents with    Foot Pain    Leg Swelling     History obtained from the patient. HISTORY OF PRESENT ILLNESS    HPI  Zachery Hollingsworth is a 61 y.o. male with PMHx of chronic diastolic CHF, MI, CAD status post angioplasty 10/2013 who presents to the emergency department for evaluation of toe pain 4 days. Patient reports bluish discoloration of the second, third and fourth toe on the right foot. States that the toes are painful with palpation. Patient reports medication noncompliance for several months. Patient denies any fevers, chills, weakness, loss sensation. Patient had a segmental pressure study in 2013 which was limited by vascular calcifications. The patient has no other acute complaints at this time. REVIEW OF SYSTEMS   Review of Systems   Constitutional: Negative for chills and fever. HENT: Negative for sore throat and trouble swallowing. Eyes: Negative for photophobia and visual disturbance. Respiratory: Negative for cough and shortness of breath. Cardiovascular: Negative for chest pain, palpitations and leg swelling. Gastrointestinal: Negative for abdominal pain, diarrhea, nausea and vomiting. Genitourinary: Negative for difficulty urinating and flank pain. Musculoskeletal: Negative for arthralgias, back pain and myalgias. Neurological: Negative for seizures, syncope and headaches.          PAST MEDICAL AND SURGICAL HISTORY     Past Medical History:   Diagnosis Date    CAD (coronary artery disease)     Chronic diastolic congestive heart failure ([facility] 3/19/2018    Hyperlipidemia     Hypertension     MI (myocardial infarction) ([facility] 03/21/2013    Tobacco abuse      Past Surgical History:   Procedure Laterality Date    [facility]    CORONARY ANGIOPLASTY WITH STENT PLACEMENT  10-1-13         MEDICATIONS     Current Facility-Administered Medications:     heparin (porcine) injection 7,980 Units, 80 Units/kg, IntraVENous, Once, Denisse Jones MD    heparin (porcine) injection 7,980 Units, 80 Units/kg, IntraVENous, PRN, Denisse Jones MD    heparin (porcine) injection 3,990 Units, 40 Units/kg, IntraVENous, PRN, Denisse Jones MD    heparin 25,000 units in dextrose 5% 250 mL (premix) infusion, 5-30 Units/kg/hr, IntraVENous, Continuous, Denisse Jones MD    Current Outpatient Medications:     atorvastatin (LIPITOR) 20 MG tablet, TAKE ONE TABLET BY MOUTH ONCE DAILY, Disp: 90 tablet, Rfl: 3    carvedilol (COREG) 25 MG tablet, TAKE ONE TABLET BY MOUTH TWICE DAILY WITH MEALS, Disp: 180 tablet, Rfl: 3    clopidogrel (PLAVIX) 75 MG tablet, TAKE ONE TABLET BY MOUTH ONCE DAILY, Disp: 90 tablet, Rfl: 3    furosemide (LASIX) 40 MG tablet, Take 1 tablet by mouth daily, Disp: 90 tablet, Rfl: 3    lisinopril (PRINIVIL;ZESTRIL) 10 MG tablet, TAKE 1 TABLET BY MOUTH TWICE DAILY, Disp: 180 tablet, Rfl: 1    aspirin 81 MG tablet, Take 1 tablet by mouth daily.  With food  Indications: Cardiovascular Risk Reduction, blood thinner, Disp: 30 tablet, Rfl: 3      SOCIAL HISTORY     Social History     Social History Narrative    Not on file     Social History     Tobacco Use    Smoking status: Current Every Day Smoker     Packs/day: 1.50     Years: 40.00     Pack years: 60.00     Types: Cigarettes    Smokeless tobacco: Never Used   Vaping Use    Vaping Use: Never used   Substance Use Topics    Alcohol use: Yes     Comment: 12 pk per day/ has cut down     Drug use: No         ALLERGIES   No Known Allergies      FAMILY HISTORY     Family History   Problem Relation Age of Onset    Diabetes Mother     Stroke Mother     Heart Disease Father     High Blood Pressure Brother          PREVIOUS RECORDS   Previous records reviewed: I reviewed the patient's past medical records including relevant labs, imaging and procedures. PHYSICAL EXAM     ED Triage Vitals [11/02/21 1548]   BP Temp Temp Source Pulse Resp SpO2 Height Weight   (!) 164/85 98.3 °F (36.8 °C) Oral 104 14 97 % -- 220 lb (99.8 kg)     Initial vital signs and nursing assessment reviewed and abnormal from tachycardia. Body mass index is 32.49 kg/m². Pulsoximetry is normal per my interpretation. Additional Vital Signs:  Vitals:    11/02/21 1708   BP: (!) 152/84   Pulse: 93   Resp: 15   Temp:    SpO2: 96%       Physical Exam  Vitals and nursing note reviewed. Constitutional:       General: He is not in acute distress. Appearance: Normal appearance. He is normal weight. He is not toxic-appearing. HENT:      Head: Normocephalic and atraumatic. Right Ear: Tympanic membrane normal.      Left Ear: Tympanic membrane normal.      Nose: Nose normal.      Mouth/Throat:      Mouth: Mucous membranes are moist.      Pharynx: Oropharynx is clear. Eyes:      General: No scleral icterus. Extraocular Movements: Extraocular movements intact. Conjunctiva/sclera: Conjunctivae normal.      Pupils: Pupils are equal, round, and reactive to light. Cardiovascular:      Rate and Rhythm: Normal rate and regular rhythm. Pulses: Normal pulses. Heart sounds: Normal heart sounds. No murmur heard. No friction rub. No gallop. Pulmonary:      Effort: Pulmonary effort is normal.      Breath sounds: Normal breath sounds. No wheezing or rales. Abdominal:      Palpations: Abdomen is soft. Tenderness: There is no abdominal tenderness. There is no guarding or rebound. Musculoskeletal:         General: Normal range of motion. Cervical back: Normal range of motion and neck supple. Right lower leg: No edema. Left lower leg: No edema.       Comments: Discoloration over the right second, third, fourth toes. Pain on palpation over the dorsal foot and toes. JOSE 0.48. Absent DP pulse (undetectable with Doppler). Posterior tibial pulses palpable   Skin:     General: Skin is warm and dry. Capillary Refill: Capillary refill takes less than 2 seconds. Neurological:      General: No focal deficit present. Mental Status: He is alert and oriented to person, place, and time. Cranial Nerves: No cranial nerve deficit. Sensory: No sensory deficit. Motor: No weakness. Coordination: Coordination normal.                 MEDICAL DECISION MAKING   Initial Assessment:   81-year-old male with history of intermittent claudication presented today with discoloration and ischemic changes of the right toe. Differential diagnosis includes but is not limited to:  Peripheral artery disease, phlegmasia cerulea dolens, venous thromboembolism, peripheral edema secondary to CHF, diabetic foot infection, diabetic neuropathy, necrotizing fasciitis      Plan:   CBC, BMP, ABIs.   Podiatry consulted: no intervention at this time; suggest vascular consult        ED RESULTS   Laboratory results:  Labs Reviewed   CBC WITH AUTO DIFFERENTIAL - Abnormal; Notable for the following components:       Result Value    RBC 4.34 (*)     Hematocrit 41.6 (*)     MCV 95.9 (*)     MCH 34.3 (*)     MCHC 35.8 (*)     All other components within normal limits   BASIC METABOLIC PANEL W/ REFLEX TO MG FOR LOW K - Abnormal; Notable for the following components:    Sodium 133 (*)     Chloride 94 (*)     Glucose 326 (*)     All other components within normal limits   ANION GAP   GLOMERULAR FILTRATION RATE, ESTIMATED   CBC   APTT   APTT       Radiologic studies results:  CTA ABDOMINAL AORTA W BILAT RUNOFF W WO CONTRAST             ED Medications administered this visit:   Medications   heparin (porcine) injection 7,980 Units (has no administration in time range)   heparin (porcine) injection 7,980 Units (has no administration in time range)   heparin (porcine) injection 3,990 Units (has no administration in time range)   heparin 25,000 units in dextrose 5% 250 mL (premix) infusion (has no administration in time range)   morphine injection 4 mg (4 mg IntraVENous Given 11/2/21 1702)   iopamidol (ISOVUE-370) 76 % injection 100 mL (100 mLs IntraVENous Given 11/2/21 1732)         ED COURSE     ED Course as of Nov 02 1820 Tue Nov 02, 2021   1610 Spoke with podiatry, they recommend vascular surgery consult    [TM]   [phone] With vascular surgery. They recommend starting patient on heparin and admitting patient. They will see him tomorrow for formal JOSE study    [TM]      ED Course User Index  [TM] Teja Delgado MD            Strict return precautions and follow up instructions were discussed with the patient prior to discharge, with which the patient agrees. MEDICATION CHANGES     New Prescriptions    No medications on file         FINAL DISPOSITION     Final diagnoses:   Peripheral artery disease ([address]     Condition: condition: serious  Dispo: Admit to med/surg floor      This transcription was electronically signed. Parts of this transcriptions may have been dictated by use of voice recognition software and electronically transcribed, and parts may have been transcribed with the assistance of an ED scribe. The transcription may contain errors not detected in proofreading. Please refer to my supervising physician's documentation if my documentation differs.     Electronically Signed: Jessica Guerrero MD, 11/02/21, 6:20 PM         Teja Delgado MD  Resident  11/02/21 1621

## 2021-11-02 NOTE — ED NOTES
ED nurse-to-nurse bedside report    Chief Complaint   Patient presents with    Foot Pain    Leg Swelling      LOC: alert and orientated to name, place, date  Vital signs   Vitals:    11/02/21 1548 11/02/21 1658 11/02/21 1708 11/02/21 1853   BP: (!) 164/85  (!) 152/84 134/78   Pulse: 104 91 93 82   Resp: 14 16 15 17   Temp: 98.3 °F (36.8 °C)      TempSrc: Oral      SpO2: 97% 96% 96% 94%   Weight: 220 lb (99.8 kg)         Pain:    Pain Interventions: See MAR  Oxygen: NA    Current needs required room air   Telemetry: Yes  LDAs:   Peripheral IV 11/02/21 Right Antecubital (Active)   Site Assessment Clean;Dry; Intact 11/02/21 1855   Line Status Normal saline locked 11/02/21 1855   Dressing Status Clean;Dry; Intact 11/02/21 1855     Continuous Infusions:    heparin (PORCINE) Infusion       Mobility: Independent  [place] Fall Risk Score: No flowsheet data found.   Fall Interventions: Side rails up x 2 , call light in reach  Report given to: Jarod Steve RN  11/02/21 1927

## 2021-11-02 NOTE — ED PROVIDER NOTES
[address]    Pt Name: Margaret Faulkner  MRN: [id number]  [place] 1955  Date of evaluation: 9/12/20      I personally saw and examined the patient. I have reviewed and agree with the Resident findings, including all diagnostic interpretations and treatment plans as written. I was present for the key portion of any procedures performed and the inclusive time noted in any critical care statement. History: Patient was seen with Teresa Mancuso, resident physician    This  a 53-year-old male who has a history of peripheral vascular disease although he states he has not been to see a vascular specialist in years. He is actually still smoking. He has developed some mottling of toes 2 3 and 4 on the right. He describes pain there rather than loss of sensation. ABIs are abnormal.  The DP pulse is hard to feel on the right. Does have intact sensation throughout the foot. My resident has discussed the case with  who has recommended that we get a of CTA of the aorta with runoffs which did not give a definite indication of thrombus. However the patient continues to be symptomatic. The vascular specialist is recommending that we heparinized the patient and admit.           Chantal Bey, [address] [place] [address], DO  11/02/21 1932

## 2021-11-02 NOTE — ED NOTES
Patient is resting in bed with easy and unlabored respirations. Call light in reach. Side rails up x2. Patient denies further complaints or concerns. Will monitor.       Lexa Kemp  11/02/21 2958

## 2021-11-02 NOTE — H&P
Hospitalist - History & Physical      Patient: Blayne Go    Unit/Bed:4K-27/027-A  YOB: 1955  MRN: [id number]   Acct: [de-identified]   PCP: TANIA Bains - NELY      Assessment and Plan:        1. Ischemia of the right lower extremity:   a. Vascular surgery consulted in ED  b. Heparin drip  c. Resume Plavix, ASA as well  2. Peripheral vascular disease  a. As above  b. Continue Plavix, ASA, and statin  3. Tobacco abuse:   a. Nicotine patch if needed  4. Essential hypertension:   a. Continue home medications  5. Cardiomyopathy/chronical diastolic heart failure:   a. Continue Coreg and Lasix  6. Medical noncompliance:   a. Patient has not followed with physicians for several months and had not taken any oral medications for 6 months    CC:  Right foot pain    HPI: Patient presents to the ED with a four day history of right foot pain and color change. The patient was found to have ischemic changes in the distal right foot. The patient also had very weak DP pulses in the right foot. The patient states he has not taken his aspirin, Plavix or statin for several months after he ran out. The patient does continue to smoke. Vascular surgery was consulted in the ED. The patient will have formal vascular studies inpatient. The patient will be admitted for further evaluation of limb ischemia. ROS: Review of Systems   Constitutional: Negative. HENT: Negative. Eyes: Negative. Respiratory: Negative. Cardiovascular: Negative. Gastrointestinal: Negative. Endocrine: Negative. Musculoskeletal: Positive for arthralgias. Skin: Positive for color change. Allergic/Immunologic: Negative. Neurological: Negative. Hematological: Negative. Psychiatric/Behavioral: Negative.       PMH:    Past Medical History:   Diagnosis Date    CAD (coronary artery disease)     Chronic diastolic congestive heart failure ([facility] [address] 3/19/2018    Hyperlipidemia     Hypertension     MI (myocardial infarction) ([facility] 03/21/2013    Tobacco abuse      SHX:    Social History     Socioeconomic History    Marital status: Single     Spouse name: Not on file    Number of children: Not on file    Years of education: Not on file    Highest education level: Not on file   Occupational History    Not on file   Tobacco Use    Smoking status: Current Every Day Smoker     Packs/day: 1.50     Years: 40.00     Pack years: 60.00     Types: Cigarettes    Smokeless tobacco: Never Used   Vaping Use    Vaping Use: Never used   Substance and Sexual Activity    Alcohol use: Yes     Comment: 12 pk per day/ has cut down     Drug use: No    Sexual activity: Not on file   Other Topics Concern    Not on file   Social History Narrative    Not on file     Social Determinants of Health     Financial Resource Strain:     Difficulty of Paying Living Expenses:    Food Insecurity:     Worried About Running Out of Food in the Last Year:     Karolina of Food in the Last Year:    Transportation Needs:     Lack of Transportation (Medical):      Lack of Transportation (Non-Medical):    Physical Activity:     Days of Exercise per Week:     Minutes of Exercise per Session:    Stress:     Feeling of Stress :    Social Connections:     Frequency of Communication with Friends and Family:     Frequency of Social Gatherings with Friends and Family:     Attends Orthodoxy Services:     Active Member of Clubs or Organizations:     Attends Club or Organization Meetings:     Marital Status:    Intimate Partner Violence:     Fear of Current or Ex-Partner:     Emotionally Abused:     Physically Abused:     Sexually Abused:      FHX:   Family History   Problem Relation Age of Onset    Diabetes Mother     Stroke Mother     Heart Disease Father     High Blood Pressure Brother      Allergies: No Known Allergies  Medications:     heparin (PORCINE) Infusion 17 Units/kg/hr (11/03/21 0253)    sodium chloride        aspirin  81 mg Oral Daily    atorvastatin  20 mg Oral Daily    carvedilol  25 mg Oral BID WC    clopidogrel  75 mg Oral Daily    furosemide  40 mg Oral Daily    lisinopril  10 mg Oral BID    sodium chloride flush  5-40 mL IntraVENous 2 times per day     HYDROcodone 5 mg - acetaminophen, 1 tablet, Q6H PRN  heparin (porcine), 80 Units/kg, PRN  heparin (porcine), 40 Units/kg, PRN  sodium chloride flush, 5-40 mL, PRN  sodium chloride, 25 mL, PRN  ondansetron, 4 mg, Q8H PRN   Or  ondansetron, 4 mg, Q6H PRN  polyethylene glycol, 17 g, Daily PRN  acetaminophen, 650 mg, Q6H PRN   Or  acetaminophen, 650 mg, Q6H PRN  potassium chloride, 40 mEq, PRN   Or  potassium alternative oral replacement, 40 mEq, PRN   Or  potassium chloride, 10 mEq, PRN  magnesium sulfate, 2,000 mg, PRN  morphine, 2 mg, Q4H PRN   Or  morphine, 4 mg, Q4H PRN        Labs:   Recent Results (from the past 24 hour(s))   EKG 12 Lead    Collection Time: 11/02/21  3:39 PM   Result Value Ref Range    Ventricular Rate 108 BPM    Atrial Rate 108 BPM    P-R Interval 136 ms    QRS Duration 128 ms    Q-T Interval 380 ms    QTc Calculation (Bazett) 509 ms    P Axis 65 degrees    R Axis -99 degrees    T Axis 41 degrees   CBC Auto Differential    Collection Time: 11/02/21  4:00 PM   Result Value Ref Range    WBC 8.5 4.8 - 10.8 thou/mm3    RBC 4.34 (L) 4.70 - 6.10 mill/mm3    Hemoglobin 14.9 14.0 - 18.0 gm/dl    Hematocrit 41.6 (L) 42.0 - 52.0 %    MCV 95.9 (H) 80.0 - 94.0 fL    MCH 34.3 (H) 26.0 - 33.0 pg    MCHC 35.8 (H) 32.2 - 35.5 gm/dl    RDW-CV 11.8 11.5 - 14.5 %    RDW-SD 41.0 35.0 - 45.0 fL    Platelets 581 680 - 656 thou/mm3    MPV 10.8 9.4 - 12.4 fL    Seg Neutrophils 68.7 %    Lymphocytes 20.7 %    Monocytes 8.7 %    Eosinophils 0.8 %    Basophils 0.6 %    Immature Granulocytes 0.5 %    Segs Absolute 5.8 1 - 7 thou/mm3    Lymphocytes Absolute 1.8 1.0 - 4.8 thou/mm3    Monocytes Absolute 0.7 0.4 - 1.3 thou/mm3    Eosinophils Absolute 0.1 0.0 - 0.4 thou/mm3    Basophils Absolute 0.1 0.0 - 0.1 thou/mm3    Immature Grans (Abs) 0.04 0.00 - 0.07 thou/mm3    nRBC 0 /100 wbc   Basic Metabolic Panel w/ Reflex to MG    Collection Time: 11/02/21  4:00 PM   Result Value Ref Range    Sodium 133 (L) 135 - 145 meq/L    Potassium reflex Magnesium 4.1 3.5 - 5.2 meq/L    Chloride 94 (L) 98 - 111 meq/L    CO2 27 23 - 33 meq/L    Glucose 326 (H) 70 - 108 mg/dL    BUN 10 7 - 22 mg/dL    CREATININE 0.7 0.4 - 1.2 mg/dL    Calcium 9.5 8.5 - 10.5 mg/dL   Anion Gap    Collection Time: 11/02/21  4:00 PM   Result Value Ref Range    Anion Gap 12.0 8.0 - 16.0 meq/L   Glomerular Filtration Rate, Estimated    Collection Time: 11/02/21  4:00 PM   Result Value Ref Range    Est, Glom Filt Rate >90 ml/min/1.73m2   CBC    Collection Time: 11/02/21  6:33 PM   Result Value Ref Range    WBC 8.6 4.8 - 10.8 thou/mm3    RBC 4.11 (L) 4.70 - 6.10 mill/mm3    Hemoglobin 13.8 (L) 14.0 - 18.0 gm/dl    Hematocrit 39.3 (L) 42.0 - 52.0 %    MCV 95.6 (H) 80.0 - 94.0 fL    MCH 33.6 (H) 26.0 - 33.0 pg    MCHC 35.1 32.2 - 35.5 gm/dl    RDW-CV 11.8 11.5 - 14.5 %    RDW-SD 41.0 35.0 - 45.0 fL    Platelets 840 875 - 647 thou/mm3    MPV 10.9 9.4 - 12.4 fL   APTT    Collection Time: 11/02/21  6:33 PM   Result Value Ref Range    aPTT 27.0 22.0 - 38.0 seconds   COVID-19, Rapid    Collection Time: 11/02/21  7:00 PM    Specimen: Nasopharyngeal Swab   Result Value Ref Range    SARS-CoV-2, NAAT NOT  DETECTED NOT DETECTED   EKG 12 Lead    Collection Time: 11/02/21 10:16 PM   Result Value Ref Range    Ventricular Rate 88 BPM    Atrial Rate 88 BPM    P-R Interval 140 ms    QRS Duration 122 ms    Q-T Interval 390 ms    QTc Calculation (Bazett) 471 ms    P Axis 64 degrees    R Axis -94 degrees    T Axis 30 degrees   VRE Screen by PCR    Collection Time: 11/02/21 11:00 PM    Specimen: Rectal Swab   Result Value Ref Range    Vancomycin Resistant Enterococcus NEGATIVE    MRSA by PCR    Collection Time: 11/02/21 11:00 PM   Result Value Ref Range    MRSA SCREEN RT-PCR NEGATIVE    Heparin Level/Anti-XA    Collection Time: 11/03/21  2:17 AM   Result Value Ref Range    Heparin Unfractionated 0.77 (H) 0.30 - 0.70 U/ml   CBC auto differential    Collection Time: 11/03/21  2:17 AM   Result Value Ref Range    WBC 9.1 4.8 - 10.8 thou/mm3    RBC 4.09 (L) 4.70 - 6.10 mill/mm3    Hemoglobin 13.8 (L) 14.0 - 18.0 gm/dl    Hematocrit 39.5 (L) 42.0 - 52.0 %    MCV 96.6 (H) 80.0 - 94.0 fL    MCH 33.7 (H) 26.0 - 33.0 pg    MCHC 34.9 32.2 - 35.5 gm/dl    RDW-CV 11.8 11.5 - 14.5 %    RDW-SD 41.7 35.0 - 45.0 fL    Platelets 359 570 - 589 thou/mm3    MPV 10.9 9.4 - 12.4 fL    Seg Neutrophils 57.7 %    Lymphocytes 30.5 %    Monocytes 8.7 %    Eosinophils 1.8 %    Basophils 0.6 %    Immature Granulocytes 0.7 %    Atypical Lymphocytes RARE %    Platelet Estimate ADEQUATE Adequate    Segs Absolute 5.3 1 - 7 thou/mm3    Lymphocytes Absolute 2.8 1.0 - 4.8 thou/mm3    Monocytes Absolute 0.8 0.4 - 1.3 thou/mm3    Eosinophils Absolute 0.2 0.0 - 0.4 thou/mm3    Basophils Absolute 0.1 0.0 - 0.1 thou/mm3    Immature Grans (Abs) 0.06 0.00 - 0.07 thou/mm3    nRBC 0 /100 wbc    Anisocytosis Present Absent   Scan of Blood Smear    Collection Time: 11/03/21  2:17 AM   Result Value Ref Range    SCAN OF BLOOD SMEAR see below          Vital Signs: T: 98.4F P: 82 RR: 17 B/P: 134/78: FiO2: RA: O2 Sat:94%: I/O:     Intake/Output Summary (Last 24 hours) at 11/3/2021 0645  Last data filed at 11/3/2021 0315  Gross per 24 hour   Intake 712 ml   Output 0 ml   Net 712 ml         General:   No acute distress  HEENT:  normocephalic and atraumatic. No scleral icterus. PEARLA, mucous membranes moist  Neck: supple. Trachea midline. No JVD. Full ROM, no meningismus. Lungs: clear to auscultation. No retractions, no accessory muscle use. Cardiac: RRR, no murmur, 2+ pulses in upper extremities, 1+ LLE, 0 pulses in right DP  Abdomen: soft. Nontender.  Bowel sounds active  Extremities:  No clubbing, cyanosis x 3, no edema; RLE distal mottling and cool  Vasculature: capillary refill < 3 seconds. Skin:  warm and dry. no visible rashes  Psych:  Alert and oriented x3. Affect appropriate  Lymph:  No supraclavicular adenopathy. Neurologic:  CN II-XII grossly intact. No focal deficit. Data: (All radiographs, tracings, PFTs, and imaging are personally viewed and interpreted unless otherwise noted).     EKG: rhythm: normal sinus rhythm, rate=88 bpm, ty=732 ms, pib=099 ms, ug=924 ms, axis=64 degrees and RBBB        Electronically signed by  Vinicius Vázquez PA-C

## 2021-11-02 NOTE — ED NOTES
Patient to the ED with complaint of foot pain and swelling. Patient states that over past several days he has been having worsening foot pain in right foot with swelling of his leg. Patient notes that he has not taken any of his prescribed medications for the past 6 months. Patient is resting in bed with easy and unlabored respirations. Call light in reach. Side rails up x2. Patient denies further complaints or concerns. Will monitor.         Rafia Ross RN  11/02/21 [id number]

## 2021-11-02 NOTE — ED NOTES
Bed: 011A  Expected date: 11/2/21  Expected time: 3:37 PM  Means of arrival: [facility]  Comments:      Kary Emerson RN  11/02/21 1530

## 2021-11-03 ENCOUNTER — APPOINTMENT (OUTPATIENT)
Dept: INTERVENTIONAL RADIOLOGY/VASCULAR | Age: 66
DRG: 300 | End: 2021-11-03
Payer: MEDICARE

## 2021-11-03 LAB
ANION GAP SERPL CALCULATED.3IONS-SCNC: 11 MEQ/L (ref 8–16)
ANISOCYTOSIS: PRESENT
ATYPICAL LYMPHOCYTES: ABNORMAL %
BASOPHILS # BLD: 0.6 %
BASOPHILS ABSOLUTE: 0.1 THOU/MM3 (ref 0–0.1)
BUN BLDV-MCNC: 10 MG/DL (ref 7–22)
CALCIUM SERPL-MCNC: 9 MG/DL (ref 8.5–10.5)
CHLORIDE BLD-SCNC: 98 MEQ/L (ref 98–111)
CO2: 26 MEQ/L (ref 23–33)
CREAT SERPL-MCNC: 0.6 MG/DL (ref 0.4–1.2)
EKG ATRIAL RATE: 108 BPM
EKG ATRIAL RATE: 88 BPM
EKG P AXIS: 64 DEGREES
EKG P AXIS: 65 DEGREES
EKG P-R INTERVAL: 136 MS
EKG P-R INTERVAL: 140 MS
EKG Q-T INTERVAL: 380 MS
EKG Q-T INTERVAL: 390 MS
EKG QRS DURATION: 122 MS
EKG QRS DURATION: 128 MS
EKG QTC CALCULATION (BAZETT): 471 MS
EKG QTC CALCULATION (BAZETT): 509 MS
EKG R AXIS: -94 DEGREES
EKG R AXIS: -99 DEGREES
EKG T AXIS: 30 DEGREES
EKG T AXIS: 41 DEGREES
EKG VENTRICULAR RATE: 108 BPM
EKG VENTRICULAR RATE: 88 BPM
EOSINOPHIL # BLD: 1.8 %
EOSINOPHILS ABSOLUTE: 0.2 THOU/MM3 (ref 0–0.4)
ERYTHROCYTE [DISTWIDTH] IN BLOOD BY AUTOMATED COUNT: 11.8 % (ref 11.5–14.5)
ERYTHROCYTE [DISTWIDTH] IN BLOOD BY AUTOMATED COUNT: 41.7 FL (ref 35–45)
GFR SERPL CREATININE-BSD FRML MDRD: > 90 ML/MIN/1.73M2
GLUCOSE BLD-MCNC: 226 MG/DL (ref 70–108)
HCT VFR BLD CALC: 39.5 % (ref 42–52)
HEMOGLOBIN: 13.8 GM/DL (ref 14–18)
HEPARIN UNFRACTIONATED: 0.48 U/ML (ref 0.3–0.7)
HEPARIN UNFRACTIONATED: 0.53 U/ML (ref 0.3–0.7)
HEPARIN UNFRACTIONATED: 0.69 U/ML (ref 0.3–0.7)
HEPARIN UNFRACTIONATED: 0.77 U/ML (ref 0.3–0.7)
HEPARIN UNFRACTIONATED: 0.79 U/ML (ref 0.3–0.7)
IMMATURE GRANS (ABS): 0.06 THOU/MM3 (ref 0–0.07)
IMMATURE GRANULOCYTES: 0.7 %
LYMPHOCYTES # BLD: 30.5 %
LYMPHOCYTES ABSOLUTE: 2.8 THOU/MM3 (ref 1–4.8)
MCH RBC QN AUTO: 33.7 PG (ref 26–33)
MCHC RBC AUTO-ENTMCNC: 34.9 GM/DL (ref 32.2–35.5)
MCV RBC AUTO: 96.6 FL (ref 80–94)
MONOCYTES # BLD: 8.7 %
MONOCYTES ABSOLUTE: 0.8 THOU/MM3 (ref 0.4–1.3)
MRSA SCREEN RT-PCR: NEGATIVE
NUCLEATED RED BLOOD CELLS: 0 /100 WBC
PLATELET # BLD: 204 THOU/MM3 (ref 130–400)
PLATELET ESTIMATE: ADEQUATE
PMV BLD AUTO: 10.9 FL (ref 9.4–12.4)
POTASSIUM REFLEX MAGNESIUM: 4.1 MEQ/L (ref 3.5–5.2)
RBC # BLD: 4.09 MILL/MM3 (ref 4.7–6.1)
SCAN OF BLOOD SMEAR: NORMAL
SEG NEUTROPHILS: 57.7 %
SEGMENTED NEUTROPHILS ABSOLUTE COUNT: 5.3 THOU/MM3 (ref 1.8–7.7)
SODIUM BLD-SCNC: 135 MEQ/L (ref 135–145)
VANCOMYCIN RESISTANT ENTEROCOCCUS: NEGATIVE
WBC # BLD: 9.1 THOU/MM3 (ref 4.8–10.8)

## 2021-11-03 PROCEDURE — 85520 HEPARIN ASSAY: CPT

## 2021-11-03 PROCEDURE — 99222 1ST HOSP IP/OBS MODERATE 55: CPT | Performed by: THORACIC SURGERY (CARDIOTHORACIC VASCULAR SURGERY)

## 2021-11-03 PROCEDURE — 85025 COMPLETE CBC W/AUTO DIFF WBC: CPT

## 2021-11-03 PROCEDURE — 6360000002 HC RX W HCPCS: Performed by: PHYSICIAN ASSISTANT

## 2021-11-03 PROCEDURE — 6370000000 HC RX 637 (ALT 250 FOR IP): Performed by: PHYSICIAN ASSISTANT

## 2021-11-03 PROCEDURE — 36415 COLL VENOUS BLD VENIPUNCTURE: CPT

## 2021-11-03 PROCEDURE — 2060000000 HC ICU INTERMEDIATE R&B

## 2021-11-03 PROCEDURE — 93971 EXTREMITY STUDY: CPT

## 2021-11-03 PROCEDURE — 2580000003 HC RX 258: Performed by: PHYSICIAN ASSISTANT

## 2021-11-03 PROCEDURE — 99233 SBSQ HOSP IP/OBS HIGH 50: CPT | Performed by: INTERNAL MEDICINE

## 2021-11-03 PROCEDURE — 80048 BASIC METABOLIC PNL TOTAL CA: CPT

## 2021-11-03 PROCEDURE — 6370000000 HC RX 637 (ALT 250 FOR IP): Performed by: THORACIC SURGERY (CARDIOTHORACIC VASCULAR SURGERY)

## 2021-11-03 RX ORDER — HYDROCODONE BITARTRATE AND ACETAMINOPHEN 5; 325 MG/1; MG/1
1 TABLET ORAL EVERY 6 HOURS PRN
Status: DISCONTINUED | OUTPATIENT
Start: 2021-11-03 | End: 2021-11-06 | Stop reason: HOSPADM

## 2021-11-03 RX ORDER — NICOTINE 21 MG/24HR
1 PATCH, TRANSDERMAL 24 HOURS TRANSDERMAL DAILY
Status: DISCONTINUED | OUTPATIENT
Start: 2021-11-03 | End: 2021-11-06 | Stop reason: HOSPADM

## 2021-11-03 RX ADMIN — ASPIRIN 81 MG: 81 TABLET, CHEWABLE ORAL at 09:52

## 2021-11-03 RX ADMIN — ATORVASTATIN CALCIUM 20 MG: 20 TABLET, FILM COATED ORAL at 09:53

## 2021-11-03 RX ADMIN — FUROSEMIDE 40 MG: 40 TABLET ORAL at 09:53

## 2021-11-03 RX ADMIN — LISINOPRIL 10 MG: 10 TABLET ORAL at 09:52

## 2021-11-03 RX ADMIN — CARVEDILOL 25 MG: 25 TABLET, FILM COATED ORAL at 09:53

## 2021-11-03 RX ADMIN — CLOPIDOGREL BISULFATE 75 MG: 75 TABLET ORAL at 09:53

## 2021-11-03 RX ADMIN — LISINOPRIL 10 MG: 10 TABLET ORAL at 20:47

## 2021-11-03 RX ADMIN — SODIUM CHLORIDE, PRESERVATIVE FREE 10 ML: 5 INJECTION INTRAVENOUS at 20:47

## 2021-11-03 RX ADMIN — ACETAMINOPHEN 650 MG: 325 TABLET ORAL at 03:29

## 2021-11-03 RX ADMIN — SODIUM CHLORIDE, PRESERVATIVE FREE 10 ML: 5 INJECTION INTRAVENOUS at 09:52

## 2021-11-03 RX ADMIN — CARVEDILOL 25 MG: 25 TABLET, FILM COATED ORAL at 16:20

## 2021-11-03 RX ADMIN — HEPARIN SODIUM 17 UNITS/KG/HR: 10000 INJECTION, SOLUTION INTRAVENOUS at 11:19

## 2021-11-03 RX ADMIN — RIVAROXABAN 2.5 MG: 2.5 TABLET, FILM COATED ORAL at 16:20

## 2021-11-03 ASSESSMENT — PAIN - FUNCTIONAL ASSESSMENT
PAIN_FUNCTIONAL_ASSESSMENT: ACTIVITIES ARE NOT PREVENTED

## 2021-11-03 ASSESSMENT — ENCOUNTER SYMPTOMS
RESPIRATORY NEGATIVE: 1
COLOR CHANGE: 1
EYES NEGATIVE: 1
ALLERGIC/IMMUNOLOGIC NEGATIVE: 1
GASTROINTESTINAL NEGATIVE: 1

## 2021-11-03 ASSESSMENT — PAIN DESCRIPTION - ONSET
ONSET: ON-GOING

## 2021-11-03 ASSESSMENT — PAIN DESCRIPTION - PAIN TYPE
TYPE: ACUTE PAIN

## 2021-11-03 ASSESSMENT — PAIN DESCRIPTION - DESCRIPTORS
DESCRIPTORS: DULL;ACHING;SHARP
DESCRIPTORS: DULL;SHARP
DESCRIPTORS: DULL;ACHING;SHARP

## 2021-11-03 ASSESSMENT — PAIN DESCRIPTION - ORIENTATION
ORIENTATION: RIGHT

## 2021-11-03 ASSESSMENT — PAIN SCALES - GENERAL
PAINLEVEL_OUTOF10: 4
PAINLEVEL_OUTOF10: 0
PAINLEVEL_OUTOF10: 5
PAINLEVEL_OUTOF10: 5

## 2021-11-03 ASSESSMENT — PAIN DESCRIPTION - LOCATION
LOCATION: TOE (COMMENT WHICH ONE)

## 2021-11-03 ASSESSMENT — PAIN DESCRIPTION - PROGRESSION
CLINICAL_PROGRESSION: GRADUALLY IMPROVING
CLINICAL_PROGRESSION: GRADUALLY WORSENING
CLINICAL_PROGRESSION: GRADUALLY WORSENING

## 2021-11-03 ASSESSMENT — PAIN DESCRIPTION - FREQUENCY
FREQUENCY: CONTINUOUS

## 2021-11-03 NOTE — CARE COORDINATION
11/3/21, 12:56 PM EDT  DISCHARGE PLANNING EVALUATION:    Akua Harvey       Admitted: 11/2/2021/ [facility] day: 1   Location: [facility] Reason for admit: Peripheral artery disease ([facility] [address] [I73.9]  Ischemia of right lower extremity [I99.8]   PMH:  has a past medical history of CAD (coronary artery disease), Chronic diastolic congestive heart failure ([facility] [address] Hyperlipidemia, Hypertension, MI (myocardial infarction) ([facility] [address] and Tobacco abuse. Procedure:   11/3 CTA Abdominal Aorta w B/L Runoff  Barriers to Discharge:  PAD b/l femoral arteries; complete occlusion. CTS following. Heparin gtt continued  PCP: Leocadia Brittle, APRN - CNP  Readmission Risk Score: 7.6%    Patient Goals/Plan/Treatment Preferences: client denied needs as plans home w roommate Deborah Gonzalez  Transportation/Food Security/Housekeeping Addressed:  No issues identified.

## 2021-11-03 NOTE — FLOWSHEET NOTE
11/03/21 0246   Provider Notification   Reason for Communication New orders   Provider Name Sheryl Manuel   Provider Notification Advance Practice Clinician (CNS/NP/CNM/CRNA/PA)   Method of Communication Secure Message   Response See orders   Notification Time [id number]   Patient had not taken home medications in 6+ months. Provider notified at this time about pending MAR. See orders. Patient request [id number] at this time for different pain medication. States morphine or tylenol did not help. See orders.

## 2021-11-03 NOTE — PROGRESS NOTES
Pt admitted to  K2 ambulatory. IV site free of s/s of infection or infiltration. Vital signs obtained. Assessment and data collection initiated. Two nurse skin assessment performed by Megan Bourgeois RN and [facility] RN. Oriented to room. Policies and procedures for  explained. All questions answered with no further questions at this time. Fall prevention and safety brochure discussed with patient. Bed alarm on. Call light in reach.

## 2021-11-03 NOTE — CONSULTS
CT/CV Surgery Consult Note    11/3/2021 4:34 PM    Reason for Consult: Surgical opinion of the bluish discoloration of right third and fourth toes. CC: Painful bluish discoloration of the right third and fourth toes. HPI:    Mr. Palmer Self  is a 61year old male with a PMH including hypertension, hyperlipidemia, coronary artery disease with myocardial infarction, status post coronary angioplasty in , congestive heart failure, peripheral arterial disease, and tobacco abuse. The patient was admitted through the emergency department yesterday with painful, bluish discoloration of the right toes. He had a CTA of abdomen, pelvis and runoff yesterday. This showed occluded superficial femoral artery bilaterally. Intravenous heparin was initiated yesterday. He reports slightly improved symptoms. He denied any claudication when he walks. He denies any recent episodes of midsternal chest pain. Vital Signs: BP (!) 114/58   Pulse 76   Temp 98 °F (36.7 °C) (Oral)   Resp 16   Ht 5' 8\" (1.727 m)   Wt 228 lb 14.4 oz (103.8 kg)   SpO2 96%   BMI 34.80 kg/m²    Temp (24hrs), Av.1 °F (36.7 °C), Min:97.5 °F (36.4 °C), Max:98.4 °F (36.9 °C)      PULSE OXIMETRY RANGE: SpO2  Av.3 %  Min: 94 %  Max: 96 %    SUPPLEMENTAL O2:       Labs:   CBC:   Recent Labs     21  1600 21  1833 21  0217   WBC 8.5 8.6 9.1   HGB 14.9 13.8* 13.8*   HCT 41.6* 39.3* 39.5*   MCV 95.9* 95.6* 96.6*    195 204   APTT  --  27.0  --      BMP:   Recent Labs     21  1600 21  0656   * 135   K 4.1 4.1   CL 94* 98   CO2 27 26   BUN 10 10   CREATININE 0.7 0.6     Last HgA1C:   Lab Results   Component Value Date    LABA1C 5.9 2013       Imaging:  I have reviewed CTA. Occlusion of SFA bilaterally.       Intake/Output Summary (Last 24 hours) at 11/3/2021 1634  Last data filed at 11/3/2021 1622  Gross per 24 hour   Intake 1418 ml   Output 0 ml   Net 1418 ml       Scheduled Meds:    nicotine  1 patch TransDERmal Daily    rivaroxaban  2.5 mg Oral BID    aspirin  81 mg Oral Daily    atorvastatin  20 mg Oral Daily    carvedilol  25 mg Oral BID WC    clopidogrel  75 mg Oral Daily    furosemide  40 mg Oral Daily    lisinopril  10 mg Oral BID    sodium chloride flush  5-40 mL IntraVENous 2 times per day         PastMedical History:  Mumtaz Churchill  has a past medical history of CAD (coronary artery disease), Chronic diastolic congestive heart failure ([facility] [address] Hyperlipidemia, Hypertension, MI (myocardial infarction) ([facility] [address] and Tobacco abuse. Past Surgical History:  The patient  has a past surgical history that includes Cardiac catheterization (3/13) and Coronary angioplasty with stent (10-1-13). Allergies: The patient has No Known Allergies. Family History: This patient's family history includes Diabetes in his mother; Heart Disease in his father; High Blood Pressure in his brother; Stroke in his mother. Social History:  Mumtaz Churchill  reports that he has been smoking cigarettes. He has a 60.00 pack-year smoking history. He has never used smokeless tobacco. He reports current alcohol use. He reports that he does not use drugs. ROS:  Constitutional: Negative for activity change, chills, fatigue, fever and unexpected weight change. HENT: Negative for congestion, facial swelling, sore throat, and changes in voice. Eyes: Negative for photophobia, redness, itching and visual disturbance. Respiratory: Negative for apnea, choking, shortness of breath, wheezing and stridor. Cardiovascular: Known coronary disease. No midsternal chest pain. Gastrointestinal: Negative for abdominal distention, constipation, nausea and vomiting. Endocrine: Negative for cold intolerance, heat intolerance, polyphagia and polyuria. Musculoskeletal: Painful, bluish discoloration of the right third and fourth toe. Skin: Bluish discoloration of right third and fourth toe.   No rubor in the right foot.  Allergic/Immunologic: Negative for food allergies and immunocompromised state. Neurological: Negative for dizziness, tremors, speech difficulty, weakness, numbness and headaches. Hematological: Negative for adenopathy. Does not bruise/bleed easily. Psychiatric/Behavioral: Negative for agitation, confusion, and dysphoric mood. Physical Exam:   General appearance:  No apparent distress, appears stated age and cooperative. HEENT:  Normal cephalic, atraumatic without obvious deformity. Conjunctivae/corneas clear. Neck: Supple, with full range of motion. No jugular venous distention. Trachea midline. Respiratory:  Normal respiratory effort. Clear to auscultation, bilaterally without rales/wheezes/rhonchi. Cardiovascular:  Regular rate and rhythm with normal S1/S2 without murmurs, rubs or gallops. Abdomen: Soft, non-tender, non-distended with normal bowel sounds. Musculoskeletal:   Bluish discoloration of the right third and fourth toe. Skin:  No rubor in the right foot. Neurologic:  Neurovascularly intact without any focal sensory/motor deficits. Psychiatric:  Alert and oriented, thought content appropriate, normal insight. Capillary Refill: Brisk,< 3 seconds   Peripheral Pulses: No palpable pedal pulses. Hand-held Doppler examination: Monophasic positive Doppler signal over DP and PT bilaterally. Problem List:  Patient Active Problem List   Diagnosis    NSTEMI (non-ST elevated myocardial infarction) ([address]    Hyperglycemia    Smoker    Alcohol abuse    SOB (shortness of breath)    Sleep apnea, obstructive    Fatigue    Daytime somnolence    Depression    Heart disease    Obesity (BMI 30.0-34. 9)    Presence of stent in LAD coronary artery stent    Cardiomyopathy ([address]    Essential hypertension    Coronary artery disease involving native coronary artery of native heart without angina pectoris    Pure hypercholesterolemia    Chronic diastolic congestive heart failure ([address]  SOB (shortness of breath) on exertion    Ischemia of right lower extremity       Assessment: Critical peripheral arterial disease with chronic occlusion of superficial femoral artery bilaterally. Bluish discoloration of the right third and fourth toe. Possibility of cholesterol thrombi to the digital artery of toes. Plan: 11/3/21  1. Continue IV heparin then switched to Xarelto 2.5 mg twice daily. 2. Bilateral saphenous vein mapping was ordered. 3. We will arrange for elective lower extremity revascularization procedure in the future. 4. The patient can be discharged home after completion of preop work-up. Issues discussed in detail with the patient, who understands and has no further questions. Time spent with patient: 80 minutes, of which more than 50% was spent counseling/coordinating the patient's care.     Charu Hussein MD

## 2021-11-03 NOTE — PROGRESS NOTES
Hospitalist      Patient:  Angel Serve    Unit/Bed:4K-27/027-A  YOB: 1955  MRN: [id number]   Acct: [de-identified]     PCP: TANIA Dorsey CNP  Date of Admission: 11/2/2021        Assessment and Plan:        1. 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 Diminutive flow in the tibial and peroneal arteries bilaterally, continue heparin drip and resume aspirin and Plavix vascular surgery has been consulted  3. Moderate areas of stenosis in the iliac and common femoral arteries, resume aspirin and Plavix, vascular surgery has been consulted  4. Essential hypertension we will continue home medications  5. Cardiomyopathy/chronic diastolic heart failure, will continue with Coreg and Lasix  6. Noncompliance with medication we will discuss the significance of noncompliance    CC: Right foot pain    HPI: This patient presented to the emergency department 4-day history of right foot pain and color change, the patient was found to have ischemic changes in the distal right foot. Patient underwent CTA in the above is resulted. Patient has not taken his aspirin Plavix or statin for several months. Patient continues to smoke. ROS (14 point review of systems completed. Pertinent positives noted. Otherwise ROS is negative) :  No complaints today, feet are warm    PMH:  Per HPI and       Diagnosis Date    CAD (coronary artery disease)     Chronic diastolic congestive heart failure ([facility] [address] 3/19/2018    Hyperlipidemia     Hypertension     MI (myocardial infarction) ([facility] [address] 03/21/2013    Tobacco abuse      SHX:        Procedure Laterality Date    [facility]    [facility]    CORONARY ANGIOPLASTY WITH STENT PLACEMENT  10-1-13     FHX:       Problem Relation Age of Onset    Diabetes Mother     Stroke Mother     Heart Disease Father     High Blood Pressure Brother SOCHX:   Social History     Socioeconomic History    Marital status: Single     Spouse name: None    Number of children: None    Years of education: None    Highest education level: None   Occupational History    None   Tobacco Use    Smoking status: Current Every Day Smoker     Packs/day: 1.50     Years: 40.00     Pack years: 60.00     Types: Cigarettes    Smokeless tobacco: Never Used   Vaping Use    Vaping Use: Never used   Substance and Sexual Activity    Alcohol use: Yes     Comment: 12 pk per day/ has cut down     Drug use: No    Sexual activity: None   Other Topics Concern    None   Social History Narrative    None     Social Determinants of Health     Financial Resource Strain:     Difficulty of Paying Living Expenses:    Food Insecurity:     Worried About Running Out of Food in the Last Year:     Ran Out of Food in the Last Year:    Transportation Needs:     Lack of Transportation (Medical):  Lack of Transportation (Non-Medical):    Physical Activity:     Days of Exercise per Week:     Minutes of Exercise per Session:    Stress:     Feeling of Stress :    Social Connections:     Frequency of Communication with Friends and Family:     Frequency of Social Gatherings with Friends and Family:     Attends [employer] Services:     Active Member of Clubs or Organizations:     Attends Club or Organization Meetings:     Marital Status:    Intimate Partner Violence:     Fear of Current or Ex-Partner:     Emotionally Abused:     Physically Abused:     Sexually Abused: Allergies: Patient has no known allergies.   Medications:     heparin (PORCINE) Infusion 17 Units/kg/hr (11/03/21 4701)    sodium chloride        nicotine  1 patch TransDERmal Daily    rivaroxaban  2.5 mg Oral BID    aspirin  81 mg Oral Daily    atorvastatin  20 mg Oral Daily    carvedilol  25 mg Oral BID WC    clopidogrel  75 mg Oral Daily    furosemide  40 mg Oral Daily    lisinopril  10 mg Oral BID Cranial nerves: II-XII intact, grossly non-focal.  Psychiatric:  Alert and oriented, thought content appropriate, normal insight  Capillary Refill: Decreased in lower extremities  Peripheral Pulses: +2 palpable, equal bilaterally upper and lower extremities  Lymphatics: no lymphadenopathy    Data: (All radiographs, tracings, PFTs, and imaging are personally viewed and interpreted unless otherwise noted).    Recent Labs     11/02/21  1600 11/02/21  1833 11/03/21  0217   WBC 8.5 8.6 9.1   HGB 14.9 13.8* 13.8*   HCT 41.6* 39.3* 39.5*    195 204      Recent Labs     11/02/21  1600 11/03/21  0656   * 135   K 4.1 4.1   CL 94* 98   CO2 27 26   BUN 10 10   CREATININE 0.7 0.6   CALCIUM 9.5 9.0       No results for input(s): AST, ALT, BILIDIR, BILITOT, ALKPHOS in the last 72 hours.  No results for input(s): INR in the last 72 hours.  No results for input(s): Shira Kaska in the last 72 hours. Radiology reports-   CTA ABDOMINAL AORTA W BILAT RUNOFF W WO CONTRAST    Result Date: 11/3/2021  PROCEDURE: CTA ABDOMINAL AORTA W BILAT RUNOFF W WO CONTRAST CLINICAL INFORMATION: WET READ FINDINGS: Limited images and incomplete reconstructions at the time of study review. No flow restrictive disease. Extensive atherosclerotic vascular calcifications. A final report will be generated following submission of the entire study. Zannie Cranker Dr. Meriam Lewis ON 11/2/2021 6:08 PM. FINAL REPORT: PROCEDURE: CTA ABDOMINAL AORTA W BILAT RUNOFF W WO CONTRAST CLINICAL INFORMATION: ischemia right toes; JOSE 0.48 . COMPARISON: No prior study. TECHNIQUE: A noncontrast localizer was obtained. 3 mm axial images were obtained through the abdomen, pelvis and both lower extremities during intravenous administration of 80 mL Isovue-370 injected in the right AC. 3D reconstructions were created on a dedicated 3-D workstation to include sagittal and coronal mid images through the vasculature.  Centerline reconstructions were also created. All CT scans at this facility use dose modulation, iterative reconstruction, and/or weight-based dosing when appropriate to reduce radiation dose to as low as reasonably achievable. FINDINGS: CTA abdominal aorta and runoff : There is calcified and noncalcified mural plaque in the distal abdominal aorta without flow limiting stenosis or aneurysm. There is mild stenosis at the takeoff of the celiac trunk. The superior mesenteric artery is patent without flow-limiting stenosis. There is moderate stenosis at the takeoff of the inferior mesenteric artery. There is mild stenosis in the proximal renal arteries bilaterally. There is calcified and noncalcified mural plaque in the common iliac vessels without flow-limiting stenosis on the right and moderate stenosis on the left. There is moderate stenosis in the right external iliac artery and mild stenosis on the left. There is moderate stenosis in the bilateral common femoral arteries. There  is complete occlusion of the external femoral or artery on the right at its proximal segment there is partial reconstitution distally from collaterals likely from the patent profunda femoris artery. There is also complete occlusion of the left superficial femoral artery shortly after the takeoff. There is minimal reconstitution of flow at the left popliteal artery from collaterals. As on the right side, the profunda femoral artery is patent. There is diminutive flow in the bilateral popliteal arteries. There is flow in the anterior and posterior tibial arteries on the right as well as the peroneal artery though diminutive. This continues into the ankle and foot. On the left side, there is diminutive flow in the anterior and posterior tibial arteries which also continues in the foot and ankle. There is barely detectable flow in the proximal peroneal artery on the left which tapers to no visual flow proximal to the ankle.  There is a 5 mm noncalcified nodule in the posterior lateral aspect of the right lower lobe. There is a calcified nodule more inferiorly in the right lower lobe as evidence for old granulomatous disease. Visualized portions of lungs are otherwise clear. Visualized portion of the heart is unremarkable. There is a calcified granuloma in the left lobe of the liver. Liver is diffusely hypodense without other focal lesion identified. The gallbladder is unremarkable. Adrenal glands are normal in appearance. There is mild nonspecific perinephric fat stranding. Kidneys are otherwise unremarkable. There are calcified granulomas in the spleen which is otherwise unremarkable. . The pancreas is normal in appearance. No retroperitoneal or mesenteric lymphadenopathy is identified. Large bowel appears within normal in its. The appendix is normal in appearance. Small bowel is normal in appearance. The unopacified bladder is unremarkable. The prostate is enlarged with central calcifications. No free fluid is identified. No suspicious osseous lesions are present. 1. 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 2. Diminutive flow in the tibial and peroneal arteries bilaterally with attenuated 3 vessel runoff on the right and 2 vessel runoff on the left. 3. Moderate areas of stenosis in the iliac and common femoral arteries. 4. 5 mm noncalcified nodule in the right lower lobe. Consider follow-up CT in one year per Fleischner criteria. Findings were discussed with Grace Marie RN via telephone at the time of interpretation. **This report has been created using voice recognition software. It may contain minor errors which are inherent in voice recognition technology. ** Final report electronically signed by Dr. Aleyda Lozada MD on 11/3/2021 8:44 AM      Electronically signed by Julian Chaudhry DO on 11/3/2021 at 8:55 AM

## 2021-11-03 NOTE — FLOWSHEET NOTE
11/03/21 0546   Provider Notification   Reason for Communication New orders   Provider Name [facility]   Provider Notification Advance Practice Clinician (CNS/NP/CNM/CRNA/PA)   Method of Communication Secure Message   Response See orders   Notification Time [id number]        11/03/21 0546   Provider Notification   Reason for Communication New orders   Provider Name [facility]   Provider Notification Advance Practice Clinician (CNS/NP/CNM/CRNA/PA)   Method of Communication Secure Message   Response See orders   Notification Time 9963   Provider notification received at this time to restart all home medications.

## 2021-11-04 ENCOUNTER — APPOINTMENT (OUTPATIENT)
Dept: GENERAL RADIOLOGY | Age: 66
DRG: 300 | End: 2021-11-04
Payer: MEDICARE

## 2021-11-04 LAB
AVERAGE GLUCOSE: 270 MG/DL (ref 70–126)
ERYTHROCYTE [DISTWIDTH] IN BLOOD BY AUTOMATED COUNT: 11.9 % (ref 11.5–14.5)
ERYTHROCYTE [DISTWIDTH] IN BLOOD BY AUTOMATED COUNT: 42.4 FL (ref 35–45)
HBA1C MFR BLD: 11 % (ref 4.4–6.4)
HCT VFR BLD CALC: 38.2 % (ref 42–52)
HEMOGLOBIN: 13.2 GM/DL (ref 14–18)
LV EF: 55 %
LVEF MODALITY: NORMAL
MCH RBC QN AUTO: 33.9 PG (ref 26–33)
MCHC RBC AUTO-ENTMCNC: 34.6 GM/DL (ref 32.2–35.5)
MCV RBC AUTO: 98.2 FL (ref 80–94)
MRSA SCREEN: NORMAL
PLATELET # BLD: 192 THOU/MM3 (ref 130–400)
PMV BLD AUTO: 10.7 FL (ref 9.4–12.4)
RBC # BLD: 3.89 MILL/MM3 (ref 4.7–6.1)
WBC # BLD: 10.9 THOU/MM3 (ref 4.8–10.8)

## 2021-11-04 PROCEDURE — 83036 HEMOGLOBIN GLYCOSYLATED A1C: CPT

## 2021-11-04 PROCEDURE — 6370000000 HC RX 637 (ALT 250 FOR IP): Performed by: PHYSICIAN ASSISTANT

## 2021-11-04 PROCEDURE — 6370000000 HC RX 637 (ALT 250 FOR IP): Performed by: THORACIC SURGERY (CARDIOTHORACIC VASCULAR SURGERY)

## 2021-11-04 PROCEDURE — 71045 X-RAY EXAM CHEST 1 VIEW: CPT

## 2021-11-04 PROCEDURE — 93306 TTE W/DOPPLER COMPLETE: CPT

## 2021-11-04 PROCEDURE — 99223 1ST HOSP IP/OBS HIGH 75: CPT | Performed by: INTERNAL MEDICINE

## 2021-11-04 PROCEDURE — 2060000000 HC ICU INTERMEDIATE R&B

## 2021-11-04 PROCEDURE — 99232 SBSQ HOSP IP/OBS MODERATE 35: CPT | Performed by: INTERNAL MEDICINE

## 2021-11-04 PROCEDURE — 36415 COLL VENOUS BLD VENIPUNCTURE: CPT

## 2021-11-04 PROCEDURE — 99222 1ST HOSP IP/OBS MODERATE 55: CPT | Performed by: INTERNAL MEDICINE

## 2021-11-04 PROCEDURE — 85027 COMPLETE CBC AUTOMATED: CPT

## 2021-11-04 PROCEDURE — 2580000003 HC RX 258: Performed by: PHYSICIAN ASSISTANT

## 2021-11-04 RX ORDER — FUROSEMIDE 40 MG/1
40 TABLET ORAL DAILY
Qty: 30 TABLET | Refills: 1 | Status: SHIPPED | OUTPATIENT
Start: 2021-11-04 | End: 2021-11-30 | Stop reason: SDUPTHER

## 2021-11-04 RX ORDER — METHYLPREDNISOLONE SODIUM SUCCINATE 40 MG/ML
40 INJECTION, POWDER, LYOPHILIZED, FOR SOLUTION INTRAMUSCULAR; INTRAVENOUS DAILY
Status: DISCONTINUED | OUTPATIENT
Start: 2021-11-04 | End: 2021-11-04

## 2021-11-04 RX ORDER — METHYLPREDNISOLONE SODIUM SUCCINATE 40 MG/ML
40 INJECTION, POWDER, LYOPHILIZED, FOR SOLUTION INTRAMUSCULAR; INTRAVENOUS DAILY
Status: DISCONTINUED | OUTPATIENT
Start: 2021-11-04 | End: 2021-11-05

## 2021-11-04 RX ORDER — CLOPIDOGREL BISULFATE 75 MG/1
75 TABLET ORAL DAILY
Qty: 30 TABLET | Refills: 1 | Status: SHIPPED | OUTPATIENT
Start: 2021-11-05 | End: 2021-11-30 | Stop reason: SDUPTHER

## 2021-11-04 RX ORDER — CLOTRIMAZOLE 1 %
CREAM (GRAM) TOPICAL 2 TIMES DAILY
Status: DISCONTINUED | OUTPATIENT
Start: 2021-11-04 | End: 2021-11-06 | Stop reason: HOSPADM

## 2021-11-04 RX ORDER — NICOTINE 21 MG/24HR
1 PATCH, TRANSDERMAL 24 HOURS TRANSDERMAL DAILY
Qty: 30 PATCH | Refills: 1 | Status: SHIPPED | OUTPATIENT
Start: 2021-11-04 | End: 2021-11-10

## 2021-11-04 RX ORDER — CARVEDILOL 25 MG/1
25 TABLET ORAL 2 TIMES DAILY WITH MEALS
Qty: 60 TABLET | Refills: 1 | Status: SHIPPED | OUTPATIENT
Start: 2021-11-04 | End: 2021-11-10

## 2021-11-04 RX ORDER — LISINOPRIL 5 MG/1
5 TABLET ORAL 2 TIMES DAILY
Status: DISCONTINUED | OUTPATIENT
Start: 2021-11-05 | End: 2021-11-06 | Stop reason: HOSPADM

## 2021-11-04 RX ORDER — ASPIRIN 81 MG/1
81 TABLET, CHEWABLE ORAL DAILY
Qty: 30 TABLET | Refills: 1 | Status: SHIPPED | OUTPATIENT
Start: 2021-11-05

## 2021-11-04 RX ORDER — IPRATROPIUM BROMIDE AND ALBUTEROL SULFATE 2.5; .5 MG/3ML; MG/3ML
1 SOLUTION RESPIRATORY (INHALATION)
Status: DISCONTINUED | OUTPATIENT
Start: 2021-11-04 | End: 2021-11-05

## 2021-11-04 RX ORDER — LISINOPRIL 10 MG/1
10 TABLET ORAL 2 TIMES DAILY
Qty: 30 TABLET | Refills: 3 | Status: SHIPPED | OUTPATIENT
Start: 2021-11-04 | End: 2021-11-10

## 2021-11-04 RX ORDER — ATORVASTATIN CALCIUM 20 MG/1
20 TABLET, FILM COATED ORAL DAILY
Qty: 30 TABLET | Refills: 1 | Status: SHIPPED | OUTPATIENT
Start: 2021-11-05 | End: 2021-11-30 | Stop reason: SDUPTHER

## 2021-11-04 RX ORDER — CARVEDILOL 6.25 MG/1
12.5 TABLET ORAL 2 TIMES DAILY WITH MEALS
Status: DISCONTINUED | OUTPATIENT
Start: 2021-11-05 | End: 2021-11-06 | Stop reason: HOSPADM

## 2021-11-04 RX ORDER — FUROSEMIDE 20 MG/1
20 TABLET ORAL DAILY
Status: DISCONTINUED | OUTPATIENT
Start: 2021-11-05 | End: 2021-11-06

## 2021-11-04 RX ADMIN — CLOPIDOGREL BISULFATE 75 MG: 75 TABLET ORAL at 09:04

## 2021-11-04 RX ADMIN — HYDROCODONE BITARTRATE AND ACETAMINOPHEN 1 TABLET: 5; 325 TABLET ORAL at 04:11

## 2021-11-04 RX ADMIN — SODIUM CHLORIDE, PRESERVATIVE FREE 10 ML: 5 INJECTION INTRAVENOUS at 20:21

## 2021-11-04 RX ADMIN — SODIUM CHLORIDE, PRESERVATIVE FREE 10 ML: 5 INJECTION INTRAVENOUS at 09:04

## 2021-11-04 RX ADMIN — FUROSEMIDE 40 MG: 40 TABLET ORAL at 09:04

## 2021-11-04 RX ADMIN — ATORVASTATIN CALCIUM 20 MG: 20 TABLET, FILM COATED ORAL at 09:04

## 2021-11-04 RX ADMIN — CLOTRIMAZOLE: 10 CREAM TOPICAL at 17:42

## 2021-11-04 RX ADMIN — RIVAROXABAN 2.5 MG: 2.5 TABLET, FILM COATED ORAL at 20:21

## 2021-11-04 RX ADMIN — LISINOPRIL 10 MG: 10 TABLET ORAL at 09:04

## 2021-11-04 RX ADMIN — ASPIRIN 81 MG: 81 TABLET, CHEWABLE ORAL at 09:04

## 2021-11-04 RX ADMIN — HYDROCODONE BITARTRATE AND ACETAMINOPHEN 1 TABLET: 5; 325 TABLET ORAL at 23:30

## 2021-11-04 RX ADMIN — RIVAROXABAN 2.5 MG: 2.5 TABLET, FILM COATED ORAL at 09:04

## 2021-11-04 RX ADMIN — CARVEDILOL 25 MG: 25 TABLET, FILM COATED ORAL at 09:04

## 2021-11-04 RX ADMIN — HYDROCODONE BITARTRATE AND ACETAMINOPHEN 1 TABLET: 5; 325 TABLET ORAL at 15:32

## 2021-11-04 ASSESSMENT — PAIN SCALES - GENERAL
PAINLEVEL_OUTOF10: 7
PAINLEVEL_OUTOF10: 7
PAINLEVEL_OUTOF10: 0
PAINLEVEL_OUTOF10: 7
PAINLEVEL_OUTOF10: 4
PAINLEVEL_OUTOF10: 7

## 2021-11-04 ASSESSMENT — PAIN DESCRIPTION - ORIENTATION
ORIENTATION: RIGHT

## 2021-11-04 ASSESSMENT — PAIN DESCRIPTION - DESCRIPTORS
DESCRIPTORS: THROBBING
DESCRIPTORS: DULL;ACHING;SHARP
DESCRIPTORS: THROBBING

## 2021-11-04 ASSESSMENT — PAIN DESCRIPTION - LOCATION
LOCATION: TOE (COMMENT WHICH ONE)

## 2021-11-04 ASSESSMENT — PAIN DESCRIPTION - PAIN TYPE
TYPE: ACUTE PAIN

## 2021-11-04 ASSESSMENT — PAIN DESCRIPTION - FREQUENCY: FREQUENCY: CONTINUOUS

## 2021-11-04 ASSESSMENT — PAIN DESCRIPTION - PROGRESSION: CLINICAL_PROGRESSION: NOT CHANGED

## 2021-11-04 ASSESSMENT — PAIN DESCRIPTION - ONSET: ONSET: ON-GOING

## 2021-11-04 ASSESSMENT — PAIN - FUNCTIONAL ASSESSMENT: PAIN_FUNCTIONAL_ASSESSMENT: ACTIVITIES ARE NOT PREVENTED

## 2021-11-04 NOTE — PROGRESS NOTES
Hospitalist      Patient:  Tin Haddad    Unit/Bed:4K-27/027-A  YOB: 1955  MRN: [id number]   Acct: [de-identified]     PCP: TANIA Haskins CNP  Date of Admission: 11/2/2021        Assessment and Plan:        1. 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 Diminutive flow in the tibial and peroneal arteries bilaterally, continue heparin drip and resume aspirin and Plavix vascular surgery has been consulted  3. Moderate areas of stenosis in the iliac and common femoral arteries, resume aspirin and Plavix, vascular surgery has been consulted  4. Essential hypertension we will continue home medications  5. Cardiomyopathy/chronic diastolic heart failure, will continue with Coreg and Lasix  6. Noncompliance with medication we will discuss the significance of noncompliance    11.4.2021 patient seen this a.m. would like to go home, await cardiothoracic input on discharge today. CC: Right foot pain    HPI: This patient presented to the emergency department 4-day history of right foot pain and color change, the patient was found to have ischemic changes in the distal right foot. Patient underwent CTA in the above is resulted. Patient has not taken his aspirin Plavix or statin for several months. Patient continues to smoke. ROS (14 point review of systems completed. Pertinent positives noted. Otherwise ROS is negative) :  No complaints today, feet are warm    PMH:  Per HPI and       Diagnosis Date    CAD (coronary artery disease)     Chronic diastolic congestive heart failure ([facility] [address] 3/19/2018    Hyperlipidemia     Hypertension     MI (myocardial infarction) ([facility] [address] 03/21/2013    Tobacco abuse      SHX:        Procedure Laterality Date    [facility]    CORONARY ANGIOPLASTY WITH STENT PLACEMENT  10-1-13     FHX:       Problem Relation Age of Onset    Diabetes Mother     Stroke Mother     Heart Disease Father     High Blood Pressure Brother      SOCHX:   Social History     Socioeconomic History    Marital status: Single     Spouse name: None    Number of children: None    Years of education: None    Highest education level: None   Occupational History    None   Tobacco Use    Smoking status: Current Every Day Smoker     Packs/day: 1.50     Years: 40.00     Pack years: 60.00     Types: Cigarettes    Smokeless tobacco: Never Used   Vaping Use    Vaping Use: Never used   Substance and Sexual Activity    Alcohol use: Yes     Comment: 12 pk per day/ has cut down     Drug use: No    Sexual activity: None   Other Topics Concern    None   Social History Narrative    None     Social Determinants of Health     Financial Resource Strain:     Difficulty of Paying Living Expenses:    Food Insecurity:     Worried About Running Out of Food in the Last Year:     Ran Out of Food in the Last Year:    Transportation Needs:     Lack of Transportation (Medical):  Lack of Transportation (Non-Medical):    Physical Activity:     Days of Exercise per Week:     Minutes of Exercise per Session:    Stress:     Feeling of Stress :    Social Connections:     Frequency of Communication with Friends and Family:     Frequency of Social Gatherings with Friends and Family:     Attends [employer] Services:     Active Member of Clubs or Organizations:     Attends Club or Organization Meetings:     Marital Status:    Intimate Partner Violence:     Fear of Current or Ex-Partner:     Emotionally Abused:     Physically Abused:     Sexually Abused: Allergies: Patient has no known allergies.   Medications:     sodium chloride        nicotine  1 patch TransDERmal Daily    rivaroxaban  2.5 mg Oral BID    aspirin  81 mg Oral Daily    atorvastatin  20 mg Oral Daily    carvedilol  25 mg Oral BID WC    clopidogrel  75 mg Oral Daily    furosemide  40 mg Oral Daily    lisinopril  10 mg Oral BID    sodium chloride flush  5-40 mL IntraVENous 2 times per day     Prior to Admission medications    Medication Sig Start Date End Date Taking? Authorizing Provider   atorvastatin (LIPITOR) 20 MG tablet TAKE ONE TABLET BY MOUTH ONCE DAILY 6/10/19   TANIA Cunningham CNP   carvedilol (COREG) 25 MG tablet TAKE ONE TABLET BY MOUTH TWICE DAILY WITH MEALS 6/10/19   TANIA Cunningham CNP   clopidogrel (PLAVIX) 75 MG tablet TAKE ONE TABLET BY MOUTH ONCE DAILY 6/10/19   TANIA Cunningham CNP   furosemide (LASIX) 40 MG tablet Take 1 tablet by mouth daily 6/10/19   TANIA Cunningham CNP   lisinopril (PRINIVIL;ZESTRIL) 10 MG tablet TAKE 1 TABLET BY MOUTH TWICE DAILY 6/10/19   TANIA Cunningham CNP   aspirin 81 MG tablet Take 1 tablet by mouth daily. With food  Indications: Cardiovascular Risk Reduction, blood thinner 9/13/13   TANIA Mcqueen CNP      PHYSICAL EXAM:    BP (!) 107/57   Pulse 82   Temp 98.2 °F (36.8 °C) (Oral)   Resp 18   Ht 5' 8\" (1.727 m)   Wt 228 lb 14.4 oz (103.8 kg)   SpO2 91%   BMI 34.80 kg/m²     General appearance:  No apparent distress, appears stated age and cooperative. HEENT:  Normal cephalic, atraumatic without obvious deformity. Pupils equal, round, and reactive to light. Extra ocular muscles intact. Conjunctivae/corneas clear. Neck: Supple, with full range of motion. no jugular venous distention. Trachea midline. no carotid bruits  Respiratory:  Normal respiratory effort. Clear to auscultation, bilaterally without Rales/Wheezes/Rhonchi. Breath sounds equal bilaterally  Cardiovascular:  Regular rate and rhythm with normal S1/S2 without murmurs, rubs or gallops. PMI non displaced  Abdomen: Soft, non-tender, non-distended with normal bowel sounds. No guarding, rebound. Musculoskeletal: Markedly decreased pulses in lower extremities, feet are warm. Skin: Right foot warm.   Neurologic:  Neurovascularly intact without any focal sensory/motor deficits. Cranial nerves: II-XII intact, grossly non-focal.  Psychiatric:  Alert and oriented, thought content appropriate, normal insight  Capillary Refill: Decreased in lower extremities  Peripheral Pulses: +2 palpable, equal bilaterally upper and lower extremities  Lymphatics: no lymphadenopathy    Data: (All radiographs, tracings, PFTs, and imaging are personally viewed and interpreted unless otherwise noted).    Recent Labs     11/02/21  1833 11/03/21  0217 11/04/21  0616   WBC 8.6 9.1 10.9*   HGB 13.8* 13.8* 13.2*   HCT 39.3* 39.5* 38.2*    204 192     Recent Labs     11/02/21  1600 11/03/21  0656   * 135   K 4.1 4.1   CL 94* 98   CO2 27 26   BUN 10 10   CREATININE 0.7 0.6   CALCIUM 9.5 9.0     No results for input(s): AST, ALT, BILIDIR, BILITOT, ALKPHOS in the last 72 hours. No results for input(s): INR in the last 72 hours. No results for input(s): Rhae Childes in the last 72 hours. Radiology reports-   CTA ABDOMINAL AORTA W BILAT RUNOFF W WO CONTRAST    Result Date: 11/3/2021  PROCEDURE: CTA ABDOMINAL AORTA W BILAT RUNOFF W WO CONTRAST CLINICAL INFORMATION: WET READ FINDINGS: Limited images and incomplete reconstructions at the time of study review. No flow restrictive disease. Extensive atherosclerotic vascular calcifications. A final report will be generated following submission of the entire study. Lenora Hernandez ON 11/2/2021 6:08 PM. FINAL REPORT: PROCEDURE: CTA ABDOMINAL AORTA W BILAT RUNOFF W WO CONTRAST CLINICAL INFORMATION: ischemia right toes; JOSE 0.48 . COMPARISON: No prior study. TECHNIQUE: A noncontrast localizer was obtained. 3 mm axial images were obtained through the abdomen, pelvis and both lower extremities during intravenous administration of 80 mL Isovue-370 injected in the right AC.   3D reconstructions were created on a dedicated 3-D workstation to include sagittal and coronal mid images through the vasculature. Centerline reconstructions were also created. All CT scans at this facility use dose modulation, iterative reconstruction, and/or weight-based dosing when appropriate to reduce radiation dose to as low as reasonably achievable. FINDINGS: CTA abdominal aorta and runoff : There is calcified and noncalcified mural plaque in the distal abdominal aorta without flow limiting stenosis or aneurysm. There is mild stenosis at the takeoff of the celiac trunk. The superior mesenteric artery is patent without flow-limiting stenosis. There is moderate stenosis at the takeoff of the inferior mesenteric artery. There is mild stenosis in the proximal renal arteries bilaterally. There is calcified and noncalcified mural plaque in the common iliac vessels without flow-limiting stenosis on the right and moderate stenosis on the left. There is moderate stenosis in the right external iliac artery and mild stenosis on the left. There is moderate stenosis in the bilateral common femoral arteries. There  is complete occlusion of the external femoral or artery on the right at its proximal segment there is partial reconstitution distally from collaterals likely from the patent profunda femoris artery. There is also complete occlusion of the left superficial femoral artery shortly after the takeoff. There is minimal reconstitution of flow at the left popliteal artery from collaterals. As on the right side, the profunda femoral artery is patent. There is diminutive flow in the bilateral popliteal arteries. There is flow in the anterior and posterior tibial arteries on the right as well as the peroneal artery though diminutive. This continues into the ankle and foot. On the left side, there is diminutive flow in the anterior and posterior tibial arteries which also continues in the foot and ankle.  There is barely detectable flow in the proximal peroneal artery on the left which tapers to no visual flow proximal to the ankle. There is a 5 mm noncalcified nodule in the posterior lateral aspect of the right lower lobe. There is a calcified nodule more inferiorly in the right lower lobe as evidence for old granulomatous disease. Visualized portions of lungs are otherwise clear. Visualized portion of the heart is unremarkable. There is a calcified granuloma in the left lobe of the liver. Liver is diffusely hypodense without other focal lesion identified. The gallbladder is unremarkable. Adrenal glands are normal in appearance. There is mild nonspecific perinephric fat stranding. Kidneys are otherwise unremarkable. There are calcified granulomas in the spleen which is otherwise unremarkable. . The pancreas is normal in appearance. No retroperitoneal or mesenteric lymphadenopathy is identified. Large bowel appears within normal in its. The appendix is normal in appearance. Small bowel is normal in appearance. The unopacified bladder is unremarkable. The prostate is enlarged with central calcifications. No free fluid is identified. No suspicious osseous lesions are present. 1. 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 2. Diminutive flow in the tibial and peroneal arteries bilaterally with attenuated 3 vessel runoff on the right and 2 vessel runoff on the left. 3. Moderate areas of stenosis in the iliac and common femoral arteries. 4. 5 mm noncalcified nodule in the right lower lobe. Consider follow-up CT in one year per Fleischner criteria. Findings were discussed with Gudelia Reagan RN via telephone at the time of interpretation. **This report has been created using voice recognition software. It may contain minor errors which are inherent in voice recognition technology. ** Final report electronically signed by Dr. Zuleima Hernandez MD on 11/3/2021 8:44 AM      Electronically signed by Lizette Gonzales DO Mariann on 11/4/2021 at 9:49 AM

## 2021-11-04 NOTE — CONSULTS
[facility]      Patient - Zachery Hollingsworth   MRN -  [id number]   Acct # - [de-identified]   - 1955      Date of Admission -  2021  3:38 PM  Date of evaluation -  2021  Room - [address] [place] [address],  [address], DO Primary Care Physician - TANIA Walls CNP     Problem List      Active Hospital Problems    Diagnosis Date Noted    Smoker [F17.200] 2013     Priority: Medium    Ischemia of right lower extremity [I99.8] 2021    Chronic diastolic congestive heart failure ([address] [I50.32] 2018    Essential hypertension [I10] 2017    Cardiomyopathy ([address] [I42.9] 2016     Reason for Consult    Preoperative pulmonary evaluation for planned surgery. HPI   History Obtained From: Patient and electronic medical record. Zachery Hollingsworth is a 61 y.o. male with a past medical history of current tobacco smoking for 40 years with 1 pack of cigarettes per day. He still smoking 1 pack of cigarettes per day. His usual functional status is half a block on level ground. Patient initially presented to the emergency room with a 4-day history of right foot pain and color change. Patient found to have ischemic changes of the distal right foot with a weak dorsalis pedis pulses. He is having shortness of breath: No  Diurnal variation:  None. Current functional capacity on level ground: 0.5 block  on level ground. He can climb steps: Yes  Flights of steps she can climb: Half a flight of stairs  He denies orthopnea. He denies paroxysmal nocturnal dyspnea. Reporting a fall downstairs okay.   No problem  He is having cough: No     He is having chest pain:NoIntake in the [place] in our phone      PMHx   Past Medical History      Diagnosis Date    CAD (coronary artery disease)     Chronic diastolic congestive heart failure ([address] 3/19/2018    Hyperlipidemia     Hypertension     MI (myocardial infarction) ([address] 03/21/2013    Tobacco abuse       Past Surgical History        Procedure Laterality Date    [facility]13    [facility]    CORONARY ANGIOPLASTY WITH STENT PLACEMENT  10-1-13     Meds    Current Medications    clotrimazole   Topical BID    [START ON 11/5/2021] lisinopril  5 mg Oral BID    [START ON 11/5/2021] carvedilol  12.5 mg Oral BID WC    [START ON 11/5/2021] furosemide  20 mg Oral Daily    nicotine  1 patch TransDERmal Daily    rivaroxaban  2.5 mg Oral BID    aspirin  81 mg Oral Daily    atorvastatin  20 mg Oral Daily    clopidogrel  75 mg Oral Daily    sodium chloride flush  5-40 mL IntraVENous 2 times per day     HYDROcodone 5 mg - acetaminophen, heparin (porcine), heparin (porcine), sodium chloride flush, sodium chloride, ondansetron **OR** ondansetron, polyethylene glycol, acetaminophen **OR** acetaminophen, potassium chloride **OR** potassium alternative oral replacement **OR** potassium chloride, magnesium sulfate, morphine **OR** morphine  IV Drips/Infusions   sodium chloride       Home Medications  Medications Prior to Admission: [DISCONTINUED] atorvastatin (LIPITOR) 20 MG tablet, TAKE ONE TABLET BY MOUTH ONCE DAILY  [DISCONTINUED] carvedilol (COREG) 25 MG tablet, TAKE ONE TABLET BY MOUTH TWICE DAILY WITH MEALS  [DISCONTINUED] clopidogrel (PLAVIX) 75 MG tablet, TAKE ONE TABLET BY MOUTH ONCE DAILY  [DISCONTINUED] lisinopril (PRINIVIL;ZESTRIL) 10 MG tablet, TAKE 1 TABLET BY MOUTH TWICE DAILY  [DISCONTINUED] aspirin 81 MG tablet, Take 1 tablet by mouth daily. With food  Indications: Cardiovascular Risk Reduction, blood thinner  Diet    ADULT DIET; Regular; Low Fat/Low Chol/High Fiber/TERESA; Low Sodium (2 gm)  Diet NPO  Allergies    Patient has no known allergies.   Social History     Social History     Tobacco Use    Smoking status: Current Every Day Smoker     Packs/day: 1.50     Years: 40.00     Pack years: 60.00     Types: Cigarettes    Smokeless tobacco: Never Used   Vaping Use itching. Vitals     height is 5' 8\" (1.727 m) and weight is 228 lb 14.4 oz (103.8 kg). His oral temperature is 98 °F (36.7 °C). His blood pressure is 124/60 and his pulse is 66. His respiration is 16 and oxygen saturation is 93%. Body mass index is 34.8 kg/m². I/O        Intake/Output Summary (Last 24 hours) at 11/4/2021 1850  Last data filed at 11/4/2021 1200  Gross per 24 hour   Intake 581 ml   Output 0 ml   Net 581 ml     I/O last 3 completed shifts: In: 1218 [P.O.:1260; I.V.:267]  Out: 0    Patient Vitals for the past 96 hrs (Last 3 readings):   Weight   11/02/21 2144 228 lb 14.4 oz (103.8 kg)   11/02/21 1548 220 lb (99.8 kg)       Exam   General Appearance: Patient appears moderately built and moderately nourished in no acute distress on room air  HEENT: Normal, Head is normocephalic, atraumatic. Oropharynx is clear and moist.  No oral thrush. PERRLA  Neck - Supple, No JVD present. No tracheal deviation present. Lungs - Bilateral air entry present. Bilateral aeration good. Occasional expiratory wheezes, rales or rhonchi,   Cardiovascular - Heart sounds are normal.  Regular rhythm normal rate without murmur, gallop or rub. Abdomen - Soft, nontender, nondistended, no masses or organomegaly  Neurologic - Awake, alert, oriented. There are no focal motor or sensory deficits  Extremities - No cyanosis, clubbing or edema. He was noted to have a cold right toe  Musculoskeletal: Normal range of motion. Patient exhibits no tenderness. Lymphadenopathy:  No cervical adenopathy. Psychiatric: Patient  has a normal mood and affect. Skin - No bruising or bleeding.     Labs  - Old records and notes have been reviewed in [facility]   ABG  No results found for: PH, PO2, PCO2, HCO3, O2SAT  No results found for: IFIO2, MODE, SETTIDVOL, SETPEEP  CBC  Recent Labs     11/02/21  1833 11/03/21  0217 11/04/21  0616   WBC 8.6 9.1 10.9*   RBC 4.11* 4.09* 3.89*   HGB 13.8* 13.8* 13.2*   HCT 39.3* 39.5* 38.2*   MCV 95.6* 96.6* 98.2*   MCH 33.6* 33.7* 33.9*   MCHC 35.1 34.9 34.6    204 192   MPV 10.9 10.9 10.7      BMP  Recent Labs     21  1600 21  0656   * 135   K 4.1 4.1   CL 94* 98   CO2 27 26   BUN 10 10   CREATININE 0.7 0.6   GLUCOSE 326* 226*   CALCIUM 9.5 9.0     LFT  No results for input(s): AST, ALT, ALB, BILITOT, ALKPHOS, LIPASE in the last 72 hours. Invalid input(s): AMYLASE  TROP  No results found for: TROPONINT  BNP  No results for input(s): BNP in the last 72 hours. Lactic Acid  No results for input(s): LACTA in the last 72 hours. INR  No results for input(s): INR, PROTIME in the last 72 hours. PTT  Recent Labs     21  1833   APTT 27.0     Glucose  No results for input(s): POCGLU in the last 72 hours. UA No results for input(s): SPECGRAV, PHUR, COLORU, CLARITYU, MUCUS, PROTEINU, BLOODU, RBCUA, WBCUA, BACTERIA, NITRU, GLUCOSEU, BILIRUBINUR, UROBILINOGEN, KETUA, LABCAST, LABCASTTY, AMORPHOS in the last 72 hours. Invalid input(s): CRYSTALS. PFTs   None in Epic. Sleep studies   PATIENT NAME: Oc Ortega           :  1955  MEDICAL RECORD NO. [id number]               ROOM:   ACCOUNT NO: [de-identified]                        DATE: 2013  PHYSICIAN: Bear Villa M.D. DIAGNOSTIC POLYSOMNOGRAM     REFERRING PHYSICIAN:  Dr. Erna Rice:  Snoring, excessive daytime somnolence, coronary artery disease. HISTORY OF PRESENT ILLNESS:  Mr. Emeli Rod is a 77-year-old gentleman,  patient of Dr. Yan Dey, with recent acute coronary syndrome with ischemic  cardiomyopathy, referred to the Sleep Lab to rule out obstructive sleep  apnea, for complaints of excessive daytime somnolence and snoring. Sleepiness scale score is 12. Weight is listed as 204 pounds. Height 68 inches. BMI 31.      METHODS:  The patient underwent digital polysomnography in compliance with  the standards and specifications from the AASM Manual including the  simultaneous

## 2021-11-04 NOTE — CARE COORDINATION
Checked with Dr. Andi Vasquez he would like clarification if Dr. Mary Lira wanted a stress or if everything was completed with pre-op work up prior to discharge.

## 2021-11-04 NOTE — PROGRESS NOTES
Perfect serve message sent to [facility], she stated Dr Javier Carrillo would like to have cardiology clear patient for surgery prior to discharge.  Perfect serve message sent to Dr Jefferson Coronado

## 2021-11-04 NOTE — CARE COORDINATION
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 Heparin drip, vascular surgery has been consulted. Cardiology to clear preop for OR. PCP: Leocadia Brittle, APRN - CNP  Readmission Risk Score: 7.9%  Patient Goals/Plan/Treatment Preferences: From home with room mate Deborah Gonzalez. Will follow.

## 2021-11-05 ENCOUNTER — APPOINTMENT (OUTPATIENT)
Dept: NON INVASIVE DIAGNOSTICS | Age: 66
DRG: 300 | End: 2021-11-05
Payer: MEDICARE

## 2021-11-05 LAB
ALBUMIN SERPL-MCNC: 4.1 G/DL (ref 3.5–5.1)
ALLEN TEST: ABNORMAL
ALP BLD-CCNC: 86 U/L (ref 38–126)
ALT SERPL-CCNC: 36 U/L (ref 11–66)
AST SERPL-CCNC: 19 U/L (ref 5–40)
BASE EXCESS (CALCULATED): 2.4 MMOL/L (ref -2.5–2.5)
BILIRUB SERPL-MCNC: 0.7 MG/DL (ref 0.3–1.2)
BILIRUBIN DIRECT: < 0.2 MG/DL (ref 0–0.3)
CHOLESTEROL, TOTAL: 140 MG/DL (ref 100–199)
COLLECTED BY:: ABNORMAL
DEVICE: ABNORMAL
HCO3: 27 MMOL/L (ref 23–28)
HDLC SERPL-MCNC: 44 MG/DL
LDL CHOLESTEROL CALCULATED: 80 MG/DL
O2 SATURATION: 89 %
PCO2: 43 MMHG (ref 35–45)
PH BLOOD GAS: 7.41 (ref 7.35–7.45)
PO2: 56 MMHG (ref 71–104)
PRO-BNP: 223.8 PG/ML (ref 0–900)
SOURCE, BLOOD GAS: ABNORMAL
TOTAL PROTEIN: 7.2 G/DL (ref 6.1–8)
TRIGL SERPL-MCNC: 79 MG/DL (ref 0–199)

## 2021-11-05 PROCEDURE — 94760 N-INVAS EAR/PLS OXIMETRY 1: CPT

## 2021-11-05 PROCEDURE — 6370000000 HC RX 637 (ALT 250 FOR IP): Performed by: INTERNAL MEDICINE

## 2021-11-05 PROCEDURE — 6360000002 HC RX W HCPCS: Performed by: INTERNAL MEDICINE

## 2021-11-05 PROCEDURE — 36600 WITHDRAWAL OF ARTERIAL BLOOD: CPT

## 2021-11-05 PROCEDURE — 6360000002 HC RX W HCPCS

## 2021-11-05 PROCEDURE — 83880 ASSAY OF NATRIURETIC PEPTIDE: CPT

## 2021-11-05 PROCEDURE — A9500 TC99M SESTAMIBI: HCPCS | Performed by: INTERNAL MEDICINE

## 2021-11-05 PROCEDURE — APPSS30 APP SPLIT SHARED TIME 16-30 MINUTES: Performed by: PHYSICIAN ASSISTANT

## 2021-11-05 PROCEDURE — 3430000000 HC RX DIAGNOSTIC RADIOPHARMACEUTICAL: Performed by: INTERNAL MEDICINE

## 2021-11-05 PROCEDURE — 2060000000 HC ICU INTERMEDIATE R&B

## 2021-11-05 PROCEDURE — 6370000000 HC RX 637 (ALT 250 FOR IP): Performed by: THORACIC SURGERY (CARDIOTHORACIC VASCULAR SURGERY)

## 2021-11-05 PROCEDURE — 99232 SBSQ HOSP IP/OBS MODERATE 35: CPT | Performed by: INTERNAL MEDICINE

## 2021-11-05 PROCEDURE — 78452 HT MUSCLE IMAGE SPECT MULT: CPT

## 2021-11-05 PROCEDURE — 6370000000 HC RX 637 (ALT 250 FOR IP): Performed by: PHYSICIAN ASSISTANT

## 2021-11-05 PROCEDURE — 36415 COLL VENOUS BLD VENIPUNCTURE: CPT

## 2021-11-05 PROCEDURE — 82803 BLOOD GASES ANY COMBINATION: CPT

## 2021-11-05 PROCEDURE — 94640 AIRWAY INHALATION TREATMENT: CPT

## 2021-11-05 PROCEDURE — 6360000002 HC RX W HCPCS: Performed by: PHYSICIAN ASSISTANT

## 2021-11-05 PROCEDURE — 2580000003 HC RX 258: Performed by: PHYSICIAN ASSISTANT

## 2021-11-05 PROCEDURE — 93017 CV STRESS TEST TRACING ONLY: CPT | Performed by: INTERNAL MEDICINE

## 2021-11-05 PROCEDURE — 99232 SBSQ HOSP IP/OBS MODERATE 35: CPT | Performed by: NURSE PRACTITIONER

## 2021-11-05 PROCEDURE — APPSS30 APP SPLIT SHARED TIME 16-30 MINUTES: Performed by: NURSE PRACTITIONER

## 2021-11-05 PROCEDURE — 94010 BREATHING CAPACITY TEST: CPT

## 2021-11-05 PROCEDURE — 80061 LIPID PANEL: CPT

## 2021-11-05 PROCEDURE — 2700000000 HC OXYGEN THERAPY PER DAY

## 2021-11-05 PROCEDURE — 80076 HEPATIC FUNCTION PANEL: CPT

## 2021-11-05 RX ORDER — PREDNISONE 20 MG/1
40 TABLET ORAL DAILY
Status: DISCONTINUED | OUTPATIENT
Start: 2021-11-06 | End: 2021-11-06 | Stop reason: HOSPADM

## 2021-11-05 RX ORDER — DEXTROSE MONOHYDRATE 25 G/50ML
12.5 INJECTION, SOLUTION INTRAVENOUS PRN
Status: DISCONTINUED | OUTPATIENT
Start: 2021-11-05 | End: 2021-11-06 | Stop reason: HOSPADM

## 2021-11-05 RX ORDER — PREDNISONE 20 MG/1
40 TABLET ORAL DAILY
Qty: 6 TABLET | Refills: 0 | Status: SHIPPED | OUTPATIENT
Start: 2021-11-06 | End: 2021-11-10

## 2021-11-05 RX ORDER — IPRATROPIUM BROMIDE AND ALBUTEROL SULFATE 2.5; .5 MG/3ML; MG/3ML
1 SOLUTION RESPIRATORY (INHALATION) 2 TIMES DAILY
Status: DISCONTINUED | OUTPATIENT
Start: 2021-11-05 | End: 2021-11-06 | Stop reason: HOSPADM

## 2021-11-05 RX ORDER — IPRATROPIUM BROMIDE AND ALBUTEROL SULFATE 2.5; .5 MG/3ML; MG/3ML
1 SOLUTION RESPIRATORY (INHALATION) EVERY 4 HOURS PRN
Status: DISCONTINUED | OUTPATIENT
Start: 2021-11-05 | End: 2021-11-06 | Stop reason: HOSPADM

## 2021-11-05 RX ORDER — GLIPIZIDE 10 MG/1
10 TABLET ORAL
Status: DISCONTINUED | OUTPATIENT
Start: 2021-11-06 | End: 2021-11-06 | Stop reason: HOSPADM

## 2021-11-05 RX ORDER — UMECLIDINIUM BROMIDE AND VILANTEROL TRIFENATATE 62.5; 25 UG/1; UG/1
1 POWDER RESPIRATORY (INHALATION) DAILY
Qty: 1 EACH | Refills: 11 | Status: SHIPPED | OUTPATIENT
Start: 2021-11-05 | End: 2022-11-05

## 2021-11-05 RX ORDER — DEXTROSE MONOHYDRATE 50 MG/ML
100 INJECTION, SOLUTION INTRAVENOUS PRN
Status: DISCONTINUED | OUTPATIENT
Start: 2021-11-05 | End: 2021-11-06 | Stop reason: HOSPADM

## 2021-11-05 RX ORDER — NICOTINE POLACRILEX 4 MG
15 LOZENGE BUCCAL PRN
Status: DISCONTINUED | OUTPATIENT
Start: 2021-11-05 | End: 2021-11-06 | Stop reason: HOSPADM

## 2021-11-05 RX ORDER — ALBUTEROL SULFATE 90 UG/1
2 AEROSOL, METERED RESPIRATORY (INHALATION) EVERY 6 HOURS PRN
Qty: 1 EACH | Refills: 11 | Status: SHIPPED | OUTPATIENT
Start: 2021-11-05

## 2021-11-05 RX ADMIN — SODIUM CHLORIDE, PRESERVATIVE FREE 10 ML: 5 INJECTION INTRAVENOUS at 20:52

## 2021-11-05 RX ADMIN — LISINOPRIL 5 MG: 10 TABLET ORAL at 10:13

## 2021-11-05 RX ADMIN — HYDROCODONE BITARTRATE AND ACETAMINOPHEN 1 TABLET: 5; 325 TABLET ORAL at 10:13

## 2021-11-05 RX ADMIN — CLOTRIMAZOLE: 10 CREAM TOPICAL at 10:13

## 2021-11-05 RX ADMIN — MORPHINE SULFATE 4 MG: 4 INJECTION, SOLUTION INTRAMUSCULAR; INTRAVENOUS at 14:39

## 2021-11-05 RX ADMIN — ATORVASTATIN CALCIUM 20 MG: 20 TABLET, FILM COATED ORAL at 10:14

## 2021-11-05 RX ADMIN — FUROSEMIDE 20 MG: 20 TABLET ORAL at 10:14

## 2021-11-05 RX ADMIN — CARVEDILOL 12.5 MG: 25 TABLET, FILM COATED ORAL at 10:14

## 2021-11-05 RX ADMIN — SODIUM CHLORIDE, PRESERVATIVE FREE 10 ML: 5 INJECTION INTRAVENOUS at 10:13

## 2021-11-05 RX ADMIN — ASPIRIN 81 MG: 81 TABLET, CHEWABLE ORAL at 10:14

## 2021-11-05 RX ADMIN — CARVEDILOL 12.5 MG: 25 TABLET, FILM COATED ORAL at 16:20

## 2021-11-05 RX ADMIN — CLOPIDOGREL BISULFATE 75 MG: 75 TABLET ORAL at 10:14

## 2021-11-05 RX ADMIN — RIVAROXABAN 2.5 MG: 2.5 TABLET, FILM COATED ORAL at 20:52

## 2021-11-05 RX ADMIN — CLOTRIMAZOLE: 10 CREAM TOPICAL at 20:53

## 2021-11-05 RX ADMIN — LISINOPRIL 5 MG: 10 TABLET ORAL at 20:52

## 2021-11-05 RX ADMIN — RIVAROXABAN 2.5 MG: 2.5 TABLET, FILM COATED ORAL at 10:14

## 2021-11-05 ASSESSMENT — PAIN DESCRIPTION - PAIN TYPE: TYPE: ACUTE PAIN

## 2021-11-05 ASSESSMENT — PAIN DESCRIPTION - ORIENTATION: ORIENTATION: RIGHT

## 2021-11-05 ASSESSMENT — PAIN SCALES - GENERAL
PAINLEVEL_OUTOF10: 5
PAINLEVEL_OUTOF10: 7
PAINLEVEL_OUTOF10: 0
PAINLEVEL_OUTOF10: 0
PAINLEVEL_OUTOF10: 7
PAINLEVEL_OUTOF10: 0
PAINLEVEL_OUTOF10: 3

## 2021-11-05 ASSESSMENT — PAIN DESCRIPTION - LOCATION: LOCATION: TOE (COMMENT WHICH ONE)

## 2021-11-05 ASSESSMENT — PAIN DESCRIPTION - PROGRESSION: CLINICAL_PROGRESSION: NOT CHANGED

## 2021-11-05 ASSESSMENT — PAIN DESCRIPTION - DESCRIPTORS: DESCRIPTORS: THROBBING

## 2021-11-05 ASSESSMENT — PAIN DESCRIPTION - FREQUENCY: FREQUENCY: INTERMITTENT

## 2021-11-05 ASSESSMENT — PAIN DESCRIPTION - ONSET: ONSET: ON-GOING

## 2021-11-05 ASSESSMENT — PAIN - FUNCTIONAL ASSESSMENT: PAIN_FUNCTIONAL_ASSESSMENT: ACTIVITIES ARE NOT PREVENTED

## 2021-11-05 NOTE — RT PROTOCOL NOTE
RT Inhaler-Nebulizer Bronchodilator Protocol Note    There is a bronchodilator order in the chart from a provider indicating to follow the RT Bronchodilator Protocol and there is an Initiate RT Inhaler-Nebulizer Bronchodilator Protocol order as well (see protocol at bottom of note). CXR Findings:  XR CHEST PORTABLE    Result Date: 11/4/2021  No acute intrathoracic process. **This report has been created using voice recognition software. It may contain minor errors which are inherent in voice recognition technology. ** Final report electronically signed by Dr. Liss Little on 11/4/2021 6:26 PM      The findings from the last RT Protocol Assessment were as follows:   History Pulmonary Disease: Chronic pulmonary disease  Respiratory Pattern: Dyspnea on exertion or RR 21-25 bpm  Breath Sounds: Slightly diminished and/or crackles  Cough: Strong, spontaneous, non-productive  Indication for Bronchodilator Therapy: Decreased or absent breath sounds  Bronchodilator Assessment Score: 6    Aerosolized bronchodilator medication orders have been revised according to the RT Inhaler-Nebulizer Bronchodilator Protocol below. Respiratory Therapist to perform RT Therapy Protocol Assessment initially then follow the protocol. Repeat RT Therapy Protocol Assessment PRN for score 0-3 or on second treatment, BID, and PRN for scores above 3. No Indications - adjust the frequency to every 6 hours PRN wheezing or bronchospasm, if no treatments needed after 48 hours then discontinue using Per Protocol order mode. If indication present, adjust the RT bronchodilator orders based on the Bronchodilator Assessment Score as indicated below.   Use Inhaler orders unless patient has one or more of the following: on home nebulizer, not able to hold breath for 10 seconds, is not alert and oriented, cannot activate and use MDI correctly, or respiratory rate 25 breaths per minute or more, then use the equivalent nebulizer order(s) with same Frequency and PRN reasons based on the score. If a patient is on this medication at home then do not decrease Frequency below that used at home. 0-3 - enter or revise RT bronchodilator order(s) to equivalent RT Bronchodilator order with Frequency of every 4 hours PRN for wheezing or increased work of breathing using Per Protocol order mode. 4-6 - enter or revise RT Bronchodilator order(s) to two equivalent RT bronchodilator orders with one order with BID Frequency and one order with Frequency of every 4 hours PRN wheezing or increased work of breathing using Per Protocol order mode. 7-10 - enter or revise RT Bronchodilator order(s) to two equivalent RT bronchodilator orders with one order with TID Frequency and one order with Frequency of every 4 hours PRN wheezing or increased work of breathing using Per Protocol order mode. 11-13 - enter or revise RT Bronchodilator order(s) to one equivalent RT bronchodilator order with QID Frequency and an Albuterol order with Frequency of every 4 hours PRN wheezing or increased work of breathing using Per Protocol order mode. Greater than 13 - enter or revise RT Bronchodilator order(s) to one equivalent RT bronchodilator order with every 4 hours Frequency and an Albuterol order with Frequency of every 2 hours PRN wheezing or increased work of breathing using Per Protocol order mode. RT to enter RT Home Evaluation for COPD & MDI Assessment order using Per Protocol order mode.     Electronically signed by Andre Cabrera RCP on 11/5/2021 at 12:45 PM

## 2021-11-05 NOTE — PROGRESS NOTES
congestive heart failure ([facility] [place] [facility] 3/19/2018    Hyperlipidemia     Hypertension     MI (myocardial infarction) ([facility] [place] [facility] 03/21/2013    Tobacco abuse       Past Surgical History        Procedure Laterality Date    CARDIAC [facility]HETERIZATION  3/13    [facility]    CORONARY ANGIOPLASTY WITH STENT PLACEMENT  10-1-13     Meds    Current Medications    [START ON 11/6/2021] glipiZIDE  10 mg Oral QAM AC    clotrimazole   Topical BID    lisinopril  5 mg Oral BID    carvedilol  12.5 mg Oral BID WC    furosemide  20 mg Oral Daily    ipratropium-albuterol  1 ampule Inhalation Q4H WA    methylPREDNISolone  40 mg IntraVENous Daily    nicotine  1 patch TransDERmal Daily    rivaroxaban  2.5 mg Oral BID    aspirin  81 mg Oral Daily    atorvastatin  20 mg Oral Daily    clopidogrel  75 mg Oral Daily    sodium chloride flush  5-40 mL IntraVENous 2 times per day     glucose, dextrose, glucagon (rDNA), dextrose, HYDROcodone 5 mg - acetaminophen, heparin (porcine), heparin (porcine), sodium chloride flush, sodium chloride, ondansetron **OR** ondansetron, polyethylene glycol, acetaminophen **OR** acetaminophen, potassium chloride **OR** potassium alternative oral replacement **OR** potassium chloride, magnesium sulfate, morphine **OR** morphine  IV Drips/Infusions   dextrose      sodium chloride       Home Medications  Medications Prior to Admission: [DISCONTINUED] atorvastatin (LIPITOR) 20 MG tablet, TAKE ONE TABLET BY MOUTH ONCE DAILY  [DISCONTINUED] carvedilol (COREG) 25 MG tablet, TAKE ONE TABLET BY MOUTH TWICE DAILY WITH MEALS  [DISCONTINUED] clopidogrel (PLAVIX) 75 MG tablet, TAKE ONE TABLET BY MOUTH ONCE DAILY  [DISCONTINUED] lisinopril (PRINIVIL;ZESTRIL) 10 MG tablet, TAKE 1 TABLET BY MOUTH TWICE DAILY  [DISCONTINUED] aspirin 81 MG tablet, Take 1 tablet by mouth daily. With food  Indications: Cardiovascular Risk Reduction, blood thinner  Diet    Diet NPO  Allergies    Patient has no known allergies.   Social History     Social History     Tobacco Use    Smoking status: Current Every Day Smoker     Packs/day: 1.50     Years: 40.00     Pack years: 60.00     Types: Cigarettes    Smokeless tobacco: Never Used   Vaping Use    Vaping Use: Never used   Substance Use Topics    Alcohol use: Yes     Comment: 12 pk per day/ has cut down     Drug use: No     Family History          Problem Relation Age of Onset    Diabetes Mother     Stroke Mother     Heart Disease Father     High Blood Pressure Brother      Sleep History    Never diagnosed with sleep apnea in the past.  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  He is currently on disability                       History of tobacco smoking:Yes  Amount of tobacco smokin.0 PPD. Years of tobacco smokin. Quit smoking: No.               Current smoker: Yes. Amount of current tobacco smokinPPD  . History of recreational or IV drug use in the past:NO     History of exposure to coal mines/coal dust: NO  History of exposure to foundry dust/welding: NO  History of exposure to quarry/silica/sandblasting: NO  History of exposure to asbestos/working with breaks/ships: NO  History of exposure to farm dust: NO  History of recent travel to long distances: NO  History of exposure to birds, pigeons, or chickens in the past:NO  Pet animals at home:No    History of pulmonary embolism in the past: No            History of DVT in the past:No      .          Vitals     height is 5' 8\" (1.727 m) and weight is 223 lb 8.7 oz (101.4 kg). His oral temperature is 98.7 °F (37.1 °C). His blood pressure is 145/70 (abnormal) and his pulse is 74. His respiration is 16 and oxygen saturation is 93%. Body mass index is 33.99 kg/m².       I/O        Intake/Output Summary (Last 24 hours) at 2021 1052  Last data filed at 2021 0402  Gross per 24 hour   Intake 340 ml   Output 0 ml Net 340 ml     I/O last 3 completed shifts: In: 540 [P.O.:520; I.V.:20]  Out: 0    Patient Vitals for the past 96 hrs (Last 3 readings):   Weight   11/05/21 0400 223 lb 8.7 oz (101.4 kg)   11/02/21 2144 228 lb 14.4 oz (103.8 kg)   11/02/21 1548 220 lb (99.8 kg)       Exam   Physical Exam   Constitutional: No distress on O2 per NC. Patient appears obese BMI 34  Head: Normocephalic and atraumatic. Mouth/Throat: Oropharynx is clear and moist.  No oral thrush. Eyes: Conjunctivae are normal. Pupils are equal, round. No scleral icterus. Neck: Neck supple. No tracheal deviation present. Cardiovascular: S1 and S2 with no murmur. No peripheral edema  Pulmonary/Chest: Normal effort with bilateral air entry, clear breath sounds. No stridor. No respiratory distress. Patient exhibits no tenderness. Abdominal: Soft. Bowel sounds audible. No distension or tenderness to palp. Musculoskeletal: Moves all extremities  Neurological: Patient is alert and oriented to person, place, and time. Skin: Noted blue hue to right foot    Labs  - Old records and notes have been reviewed in [facility]   ABG  Lab Results   Component Value Date    PH 7.41 11/05/2021    PO2 56 11/05/2021    PCO2 43 11/05/2021    HCO3 27 11/05/2021    O2SAT 89 11/05/2021     No results found for: IFIO2, MODE, SETTIDVOL, SETPEEP  CBC  Recent Labs     11/02/21  1833 11/03/21  0217 11/04/21  0616   WBC 8.6 9.1 10.9*   RBC 4.11* 4.09* 3.89*   HGB 13.8* 13.8* 13.2*   HCT 39.3* 39.5* 38.2*   MCV 95.6* 96.6* 98.2*   MCH 33.6* 33.7* 33.9*   MCHC 35.1 34.9 34.6    204 192   MPV 10.9 10.9 10.7      BMP  Recent Labs     11/02/21  1600 11/03/21  0656   * 135   K 4.1 4.1   CL 94* 98   CO2 27 26   BUN 10 10   CREATININE 0.7 0.6   GLUCOSE 326* 226*   CALCIUM 9.5 9.0     LFT  No results for input(s): AST, ALT, ALB, BILITOT, ALKPHOS, LIPASE in the last 72 hours. Invalid input(s):   AMYLASE  TROP  No results found for: TROPONINT  BNP  No results for input(s): BNP in the last 72 hours. Lactic Acid  No results for input(s): LACTA in the last 72 hours. INR  No results for input(s): INR, PROTIME in the last 72 hours. PTT  Recent Labs     21  1833   APTT 27.0     Glucose  No results for input(s): POCGLU in the last 72 hours. UA No results for input(s): SPECGRAV, PHUR, COLORU, CLARITYU, MUCUS, PROTEINU, BLOODU, RBCUA, WBCUA, BACTERIA, NITRU, GLUCOSEU, BILIRUBINUR, UROBILINOGEN, KETUA, LABCAST, LABCASTTY, AMORPHOS in the last 72 hours. Invalid input(s): CRYSTALS. ABG 2021  Results for Miranda Goodman (MRN [id number]) as of 2021 10:55   Ref. Range 2021 00:14   Source: Unknown R Brach   pH, Blood Gas Latest Ref Range: 7.35 - 7.45  7.41   PCO2 Latest Ref Range: 35 - 45 mmhg 43   pO2 Latest Ref Range: 71 - 104 mmhg 56 (L)   HCO3 Latest Ref Range: 23 - 28 mmol/l 27   Base Excess (Calculated) Latest Ref Range: -2.5 - 2.5 mmol/l 2.4   O2 Sat Latest Units: % 89   Omar Test Unknown N/A       Bedside Ifeanyi       Sleep studies   PATIENT NAME: Bret Andrade.           :  1955  MEDICAL RECORD NO. [id number]               ROOM:   ACCOUNT NO: [de-identified]                        DATE: 2013  PHYSICIAN: Jordon Chaudhari M.D. DIAGNOSTIC POLYSOMNOGRAM     REFERRING PHYSICIAN:  Dr. Adrienne Gallo:  Snoring, excessive daytime somnolence, coronary artery disease. HISTORY OF PRESENT ILLNESS:  Mr. Sandra Santiago is a 57-year-old gentleman,  patient of Dr. Angi Chambers, with recent acute coronary syndrome with ischemic  cardiomyopathy, referred to the Sleep Lab to rule out obstructive sleep  apnea, for complaints of excessive daytime somnolence and snoring. Sleepiness scale score is 12. Weight is listed as 204 pounds. Height 68 inches. BMI 31.      METHODS:  The patient underwent digital polysomnography in compliance with  the standards and specifications from the AASM Manual including the  simultaneous recording of 3 EEG channels (F4-M1, C4-M1, and O2-M1 with back  up electrodes F3-M2, C3-M2, and O1-M2), 2 EOG channels (E1-M 2, and E2-M1),  EMG (chin, left and right leg), EKG, Nonin pulse oximetry with  less than 2  second averaging time, body position, airflow recorded by oral-nasal thermal  sensor and nasal air pressure transducer, plus respiratory effort recorded by  calibrated respiratory inductance plethmography (RIP), flow volume loop,  sound and video. Sleep staging and scoring followed the standard put forth  by the American Academy of Sleep Medicine and utilized the 4A obstructive  hypopnea event desaturation of 4 percent or greater. DETAILS OF THE STUDY:  Lights out at 2252 hours and lights on at 0525 hours. Time in bed 394 minutes. Total sleep time 291 minutes. Sleep efficiency 74  percent. Sleep onset delay at 57 minutes. Wake after sleep onset 45  minutes. Latency to REM zero, as there was no REM sleep recorded. SLEEP DISRUPTION EVENTS:  There were 142 arousals for an arousal index of 29. SLEEP STAGE STATISTICS:  Mr. Sydnee Leblanc slept 93 minutes in N1 sleep or 32  percent of total sleep time, 183 minutes in N2 sleep or 63 percent of total  sleep time, 15 minutes in N3 sleep or 5 percent of total sleep time. There  was no REM sleep. EKG SUMMARY:  Mr. Sydnee Leblanc remained in normal sinus rhythm throughout the  night, with an average heart rate of 73.5 beats per minute during wakefulness  and 69 beats per minute during sleep. There were no sustained arrhythmias or  AV blocks. LIMB MOVEMENT SUMMARY:  There were a total of 90 episodes of limb movements,  65 of them were isolated and 25 were PLMs, for a limb movement index of 18.6,  with a limb movement arousal index of 3.7. RESPIRATORY SUMMARY:  There was some loud snoring recorded. There were zero  apneas, 3 hypopneas, for an AHI of 0.6. There were zero obstructive apneas,  zero central apneas and zero mixed apneas.   The respiratory disturbance index  was normal at 3.9. Pulse oximetry recorded revealed a mean oxygen saturation  during wakefulness of 96 percent and 91 percent during sleep. The lowest  oxygen saturation recorded throughout this study is 88 percent. He did not  drop oxygen saturation below 88 percent. There were 225.5 minutes of supine  sleep recorded. IMPRESSION:  Negative study for sleep disordered breathing. Difficulty  initiating and maintaining sleep with disruptive sleep architecture, with no  REM sleep recorded, which could be conceivably due to first night effect. Mild increase in number of limb movements, with a normal limb movement  arousal index. RECOMMENDATIONS:  PAP therapies are not indicated or recommended. Consider  other reasons of somnolence, review medications and sleep hygiene. I have reviewed the raw data of the above sleep study epoch by epoch and  interpreted. Sidney Suazo M.D.        D: 09/03/2013 17:51                                   T: 09/04/2013 10:43  als     CC:  LUKE Nettles M.D.  [address] [facility] [facility]                  [address]  [address]                        [address]  [facility] [place], [address] [place], [address]      Cultures    COVID-19 test ( SARS-CoV-2) is Negative/None detected on: 11/2/21      EKG     Echocardiogram   Right Ventricle   The right ventricular size was normal with normal systolic function and   wall thickness. Conclusions      Summary   Technically difficult examination. Normal left ventricle size and systolic function. Ejection fraction was   estimated at 60 %. There were no regional left ventricular wall motion   abnormalities and wall thickness was within normal limits. Doppler parameters were consistent with abnormal left ventricular   relaxation (grade 1 diastolic dysfunction).       Signature      ----------------------------------------------------------------   Electronically signed by Marnie Sanabria MD (Interpreting   physician) on 11/12/2018 at 05:25 PM      Radiology    CXR  Thu Nov 4, 2021    PROCEDURE: XR CHEST PORTABLE   No acute intrathoracic process. Assessment   -severe COPD -based on bedside spirometry  -Chronic history of tobacco smoking for 40 years with 1 pack of cigarettes per day. He still smoking 1 pack. -Coronary artery disease  -Chronic diastolic congestive heart failure.  -Essential hypertension-on treatment with medications under control    Plan   -Change solu-Medrol 40 mg IV daily to prednisone 40 mg for 5 days total therapy  -Duo nebs Q4hrs while awake  -FiO2 keep saturations more than 90%  -Patient educated to quit smoking as soon as possible. Patient verbalizes understanding of adverse consequences of tobacco smoking.  -Home O2 eval prior to discharge   -At time of discharge schedule patient for follow-[facility] at [facility]. [facility] in 3 months with Full PFT approximately 5 days prior to appointment with Shahida Mendes CNP or Dr. Rhea Thompson MD  -Will start on LAMA/LABA and KEVEN at discharge        - Margaret Faulkner educated about my impression and plan. He verbalizes understanding. Questions and concerns addressed. Electronically signed by   TANIA Joiner - CNP on 11/5/2021 at 10:52 AM     No new issues  COPD by bedside spirometry  Complete short course on steroids  Start OP bronchodilators  Follow up in pulm clinic upon DC    Patient seen and examined independently by me. Above discussed and I agree with  CNP note Also see my additional comments. Labs, cultures, and radiographs when available were reviewed. Changes were made in the orders as necessary. I discussed patient concerns with Tin's R.NYuval and instructions were given. Respiratory care issues addressed. Please see our orders for the updated patient care plan.     Electronically signed by     Samuel Gonzalez MD on 11/5/2021 at 6:58 PM

## 2021-11-05 NOTE — FLOWSHEET NOTE
11/05/21 1340   Encounter Summary   Services provided to: Patient   Referral/Consult From: Rounding   Continue Visiting Yes  (11/5)   Complexity of Encounter Moderate   Length of Encounter 15 minutes   Routine   Type Initial   Assessment Approachable   Intervention Nurtured hope   Outcome Comfort   Spiritual/Orthodoxy   Type Spiritual support   Assessment: In my encounter with the 61 yr old patient, while rounding  the unit 4k,  I provided spiritual care to patient through conversation, I also came to assess the patients spiritual needs present. The pt was admitted due to ischemia of right lower extremity. Interventions:  I provided prayer, emotional support and words of comfort.  provided a listening presence and encouraged pt to share their beliefs and how they support him during their hospitalization. Outcomes: The patient was encouraged and didnt share any further spiritual needs at this time. The pt remains optimistic and hopeful. The pt shared that they were appreciative for the support. Plan:  Chaplains will follow-up at a later time for assessment of any spiritual care needs present.

## 2021-11-05 NOTE — PROGRESS NOTES
Cardiology Progress Note      Patient:  Melanie Bailon  YOB: 1955  MRN: [id number]   Acct: [de-identified]  [facility] Date:  11/2/2021  Primary Cardiologist: Benito Little MD      Rationale for Cardiology consult: Preoperative risk assessment for vascular surgery. The patient requiring bilateral lower extremity surgical  revascularization. HPI/PMH: Per Dr. Giovanni Casper consult note of 11/4/21: This is a 65-year-old  gentleman  who presented to the emergency room with four-day history of right foot  pain and discoloration of the right toes and came to the emergency room  and he has history of intermittent claudication on the right thigh and  right calf. So, in the emergency room, he was found to have ischemic  changes in the distal right foot, particularly in the toes and basically  weak dorsalis pedis and posterior tibialis and the patient had CT of the  abdomen and pelvis with runoff and that showed occlusion of superficial  femoral artery bilaterally. Intravenous heparin was started and  vascular surgeon has been consulted. Vascular surgeon decided for  considering for bilateral revascularization of both lower extremities as  soon as possible and hence, for preoperative risk assessment Cardiology  evaluation was called. Revascularization was planned to be done as  outpatient.     Of note, this patient is known to have coronary artery disease and had  previous stent and lost to follow up with Cardiology. Last seen his  cardiologist in 2018 and has been off his medications. He is not taking  any of his cardiac medications for almost over a year. Denied having  chest pain. He has shortness of breath on exertion and has cough. He  has been coughing and chronic and he has history of COPD. He has no  orthopnea or paroxysmal nocturnal dyspnea. No leg swelling. He used to  be taking diuretics.   He is not on any diuretics now on this admission  and has been taking no medication for over a year mg IntraVENous Daily    nicotine  1 patch TransDERmal Daily    rivaroxaban  2.5 mg Oral BID    aspirin  81 mg Oral Daily    atorvastatin  20 mg Oral Daily    clopidogrel  75 mg Oral Daily    sodium chloride flush  5-40 mL IntraVENous 2 times per day      dextrose      sodium chloride       glucose, 15 g, PRN  dextrose, 12.5 g, PRN  glucagon (rDNA), 1 mg, PRN  dextrose, 100 mL/hr, PRN  HYDROcodone 5 mg - acetaminophen, 1 tablet, Q6H PRN  heparin (porcine), 80 Units/kg, PRN  heparin (porcine), 40 Units/kg, PRN  sodium chloride flush, 5-40 mL, PRN  sodium chloride, 25 mL, PRN  ondansetron, 4 mg, Q8H PRN   Or  ondansetron, 4 mg, Q6H PRN  polyethylene glycol, 17 g, Daily PRN  acetaminophen, 650 mg, Q6H PRN   Or  acetaminophen, 650 mg, Q6H PRN  potassium chloride, 40 mEq, PRN   Or  potassium alternative oral replacement, 40 mEq, PRN   Or  potassium chloride, 10 mEq, PRN  magnesium sulfate, 2,000 mg, PRN  morphine, 2 mg, Q4H PRN   Or  morphine, 4 mg, Q4H PRN      Diagnostics:  EK21  EKG 12 Lead  Order: [id number]  Status:  Final result   Visible to patient:  No (not released) Next appt:  None   0 Result Notes   Ref Range & Units 21 2216   Ventricular Rate BPM 88    Atrial Rate BPM 88    P-R Interval ms 140    QRS Duration ms 122    Q-T Interval ms 390    QTc Calculation (Bazett) ms 471    P Axis degrees 64    R Axis degrees -94    T Axis degrees 30    Resulting Agency  [facility]      Narrative & Impression    Normal sinus rhythm  Right bundle branch block  Possible Inferior infarct , age undetermined  Abnormal ECG  When compared with ECG of 2021 15:39,  No significant change was found  Confirmed by Helyn Sicard ([id number]) on 11/3/2021 5:16:48 PM      Specimen Collected: 21 22:16             Echo: 21  Transthoracic Echocardiography Report (TTE)      Demographics      Patient Name   Krishna Trinh  Gender              Male                  D      MR #           [id number]       Race RV Diastolic Dimension: 2.9 cm   Diastolic: 1.2  EF Calculated: 66 %   cm                                          LA/Aorta: 0.94     Doppler Measurements & Calculations      MV Peak E-Wave: 95.5  AV Peak Velocity: 102  LVOT Peak Velocity: 83.5 cm/s   cm/s                  cm/s                   LVOT Mean Velocity: 67.8 cm/s   MV Peak A-Wave: 93.4  AV Peak Gradient: 4.16 LVOT Peak Gradient: 3   cm/s                  mmHg                   mmHgLVOT Mean Gradient: 2   MV E/A Ratio: 1.02    AV Mean Velocity: 76.6 mmHg   MV Peak Gradient:     cm/s   3.65 mmHg             AV Mean Gradient: 3    TV Peak E-Wave: 39 cm/s                         mmHg                   TV Peak A-Wave: 44.1 cm/s   MV Deceleration Time: AV VTI: 23.4 cm   190 msec                                     TV Peak Gradient: 0.61 mmHg   MV P1/2t: 56 msec   MVA by PHT:3.93 cm^2  LVOT VTI: 20.4 cm      PV Peak Velocity: 66.4 cm/s                         IVRT: 77 msec          PV Peak Gradient: 1.76 mmHg   MV E' Septal   Velocity: 6.2 cm/s   MV A' Septal          AV DVI (VTI): 0.87AV   Velocity: 8.4 cm/s    DVI (Vmax):0.82   MV E' Lateral   Velocity: 6.5 cm/s   MV A' Lateral   Velocity: 8.8 cm/s   E/E' septal: 15.4   E/E' lateral: 14.69     [email or web address] [id number]  EF: 55%    Echo: 11/12/2018  Summary   Technically difficult examination. Normal left ventricle size and systolic function. Ejection fraction was   estimated at 60 %. There were no regional left ventricular wall motion   abnormalities and wall thickness was within normal limits. Doppler parameters were consistent with abnormal left ventricular   relaxation (grade 1 diastolic dysfunction).     Signature    ----------------------------------------------------------------   Electronically signed by Cisco Sarmiento MD (Interpreting   physician) on 11/12/2018 at 05:25 PM    Stress; Lexiscan stress: 21;  results pending    Stress; Lexiscan stress:   NUCLEAR MEDICINE THALLIUM VIABILITY STUDY     PATIENT NAME: Radha Johnson             :  1955  MEDICAL RECORD NO: [id number]                 ROOM:   ACCOUNT NO: [de-identified]                          DATE: 2013  PHYSICIAN: Dhara Mcintosh D.O.      NUCLEAR MEDICINE THALLIUM VIABILITY STUDY     CLINICAL:  Coronary artery disease. Abnormal perfusion. Evaluation for  revascularization procedure. Total occlusion of RCA. 50 percent stenosis of proximal and/or ostial LAD. Inferior hypokinesis. Ejection fraction 30 percent. TECHNIQUE:  3.6 mCi of thallium 201 IV followed by 1.0 mCi IV booster. Imaging was performed at 20 minutes, 4 hours, and 24 hours. FINDINGS:  There is reduced activity initially and with delayed imaging of  the inferior wall. This is a rather large area. There does not appear to be  significant redistribution activity. It extends more toward the septum than  the lateral wall. IMPRESSION:       1. LARGE INFERIOR WALL DEFECT WITHOUT SIGNIFICANT REDISTRIBUTION, MOST SUGGESTIVE OF SCAR. 2.    SUGGEST CLINICAL CORRELATION. Dhara Mcintosh D.O.   D: 2013 16:18                                    T: 2013 09:56  ks       Left Heart Cath:   10/1/13  [facility]  Sedation/Analgesia Post Sedation Record        Pt Name: Zi Caraballo  MRN: [id number]  YOB: 1955  Procedure Performed By: Daksha Wheeler. Sorin Jean MD, Mohit Valdivia 1466  Primary Care Physician: Roverto Velez, CNP      POST-PROCEDURE     Physicians/Assistants: Gustavo Jean MD, [address] [facility], [facility], [facility] and [facility]     Procedure Performed: PCI to mid LAD with 3.0-18 mm vinicius. Sedation/Anesthesia:Versed/fentanyl                 Estimated Blood Loss: 10 cc      Specimens Removed:  [x]? None []? Other:                                              Disposition of Specimen: []?Pathology []? Other                                         Complications:                         [x]? None Immediate []? Other:                                  Post-procedure Diagnosis/Findings: S/P Successful PCI to LAD. Recommendations: ASA/Statin/BB/PLAVIX. Gustavo Torre MD, [address] [facility]  Electronically signed 10/1/2013 at 1:25 PM      2013:  CARDIAC CATHETERIZATION LABORATORY     PATIENT NAME: Jazmín Barker             :  1955  MEDICAL RECORD NO: [id number]                 ROOM: [address] [place] [address]  ACCOUNT NO: [de-identified]                          DATE: 2013  CARDIOLOGIST: Trell Gtz M.D. REFERRING PROVIDER:  Velasquez Slade M.D. INDICATION:  History of recent STEMI, for which the patient left AMA at [facility]:  Left heart catheterization, selective coronary  angiography, left ventriculography, Finale device application. DESCRIPTION OF PROCEDURE:  Following informed consent, the patient was taken  to the Cardiac Cath Lab, prepped and draped in the usual sterile fashion. A  timeout was performed. 2 percent lidocaine was applied to the right wrist  area followed by 5F sheath insertion in the right radial artery. A mixture  of verapamil, nitroglycerin and heparin was injected through the sheath  following aspiration of arterial blood and subsequently flushed with normal  saline. Selective coronary angiography of the left and right coronary artery  was performed with repeated injections of small amounts of contrast material  in standard views. Left ventriculography, recording of LVEDP and a pullback  maneuver across the aortic valve was performed. Following removal of each  catheter over the wire, the sheath was flushed. At the end of the case, a  wristband was applied per protocol for hemostasis. The patient tolerated the  procedure well. No complications were noted. CATHETERS USED:  5F sheath, 5F JR4, 5F JL4 and pigtail catheter. MEDICATIONS:  1 mg Versed, 25 mcg fentanyl. LEFT VENTRICULOGRAPHY:  Was performed in DALEY projection. There was  inferobasal and inferior mid wall segment akinesis with relatively preserved  wall motion towards the apex of the inferior wall. Overall ejection fraction  was visually estimated in the range of 40 percent. No significant MR.     LVEDP was measured at 20 mmHg. No gradient on pullback across the aortic  valve. CORONARY ANATOMY:       1. The right coronary artery shows a subacute/recent thrombotic             occlusion in the mid segment. The distal RCA fills via left to             right collaterals. 2.    The left main coronary artery is patent. 3.    The left anterior descending coronary artery has moderately severe             diffuse disease and a more circumscript area of 50 percent             stenosis in the proximal/mid segment. 4.    The left circumflex coronary artery gives rise to several             moderately sized obtuse marginal branches. At the trifurcation of             1 of these obtuse marginal branches, there is a lesion of about 70             percent luminal narrowing. CONCLUSION:  Images were reviewed with Dr. Solomon Garcia, Interventional Cardiologist who felt that the right coronary artery and circumflex were not well suited for percutaneous intervention due to tortuosity. It was recommended to obtain Cardiosurgical consultation regarding possible bypass surgery. This also with a view towards avoiding dual antiplatelet therapy with potential stenting due to potential issues with compliance with the patient or lack of financial resources. The patient will be transferred to 3B Telemetry floor.   We will discontinue full dose Lovenox and Integrilin drip and await surgical input and then start the patient likely on Plavix in addition to a statin, ACE inhibitor, aspirin and beta blocker. This was discussed with the patient. No family was available. Lanny Rainey M.D.   D: 04/01/2013 11:54      11/3/21:  PROCEDURE: CTA ABDOMINAL AORTA W BILAT RUNOFF W WO CONTRAST       CLINICAL INFORMATION: WET READ       FINDINGS: Limited images and incomplete reconstructions at the time of study review. No flow restrictive disease. Extensive atherosclerotic vascular calcifications. A final report will be generated following submission of the entire study.   Bela Alvarez ON 11/2/2021 6:08 PM.       FINAL REPORT:       PROCEDURE: CTA ABDOMINAL AORTA W BILAT RUNOFF W WO CONTRAST       CLINICAL INFORMATION: ischemia right toes; JOSE 0.48 .       COMPARISON: No prior study.       TECHNIQUE: A noncontrast localizer was obtained. 3 mm axial images were obtained through the abdomen, pelvis and both lower extremities during intravenous administration of 80 mL Isovue-370 injected in the right [facility].  3D reconstructions were created on a    dedicated 3-D workstation to include sagittal and coronal mid images through the vasculature. Centerline reconstructions were also created.        All CT scans at this facility use dose modulation, iterative reconstruction, and/or weight-based dosing when appropriate to reduce radiation dose to as low as reasonably achievable.       FINDINGS:           CTA abdominal aorta and runoff : There is calcified and noncalcified mural plaque in the distal abdominal aorta without flow limiting stenosis or aneurysm. There is mild stenosis at the takeoff of the celiac trunk. The superior mesenteric artery is    patent without flow-limiting stenosis. There is moderate stenosis at the takeoff of the inferior mesenteric artery. There is mild stenosis in the proximal renal arteries bilaterally.  There is calcified and noncalcified mural plaque in the common iliac    vessels without flow-limiting stenosis on the right and moderate stenosis on the left. There is moderate stenosis in the right external iliac artery and mild stenosis on the left. There is moderate stenosis in the bilateral common femoral arteries. There    is complete occlusion of the external femoral or artery on the right at its proximal segment there is partial reconstitution distally from collaterals likely from the patent profunda femoris artery. There is also complete occlusion of the left    superficial femoral artery shortly after the takeoff. There is minimal reconstitution of flow at the left popliteal artery from collaterals. As on the right side, the profunda femoral artery is patent. There is diminutive flow in the bilateral popliteal    arteries. There is flow in the anterior and posterior tibial arteries on the right as well as the peroneal artery though diminutive. This continues into the ankle and foot. On the left side, there is diminutive flow in the anterior and posterior tibial    arteries which also continues in the foot and ankle. There is barely detectable flow in the proximal peroneal artery on the left which tapers to no visual flow proximal to the ankle.       There is a 5 mm noncalcified nodule in the posterior lateral aspect of the right lower lobe. There is a calcified nodule more inferiorly in the right lower lobe as evidence for old granulomatous disease. Visualized portions of lungs are otherwise clear. Visualized portion of the heart is unremarkable.       There is a calcified granuloma in the left lobe of the liver. Liver is diffusely hypodense without other focal lesion identified. The gallbladder is unremarkable. Adrenal glands are normal in appearance. There is mild nonspecific perinephric fat    stranding. Kidneys are otherwise unremarkable.  There are calcified granulomas in the spleen which is otherwise unremarkable. . The pancreas is normal in appearance.  No retroperitoneal or mesenteric lymphadenopathy is identified.       Large bowel appears within normal in its. The appendix is normal in appearance. Small bowel is normal in appearance.  The unopacified bladder is unremarkable. The prostate is enlarged with central calcifications. No free fluid is identified.       No suspicious osseous lesions are present.            Impression       1. 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 2. Diminutive flow in the tibial and peroneal arteries bilaterally with attenuated 3 vessel runoff on the right and 2 vessel runoff on the left. 3. Moderate areas of stenosis in the iliac and common femoral arteries. 4. 5 mm noncalcified nodule in the right lower lobe. Consider follow-up CT in one year per Fleischner criteria.       Findings were discussed with Brennan Lopez RN via telephone at the time of interpretation.       **This report has been created using voice recognition software. It may contain minor errors which are inherent in voice recognition technology. **       Final report electronically signed by Dr. Marino Camacho MD on 11/3/2021 8:44 AM       Lab Data:    Cardiac Enzymes:  No results for input(s): CKTOTAL, CKMB, CKMBINDEX, TROPONINI in the last 72 hours.     CBC:   Lab Results   Component Value Date    WBC 10.9 11/04/2021    RBC 3.89 11/04/2021    HGB 13.2 11/04/2021    HCT 38.2 11/04/2021     11/04/2021       CMP:    Lab Results   Component Value Date     11/03/2021    K 4.1 11/03/2021    CL 98 11/03/2021    CO2 26 11/03/2021    BUN 10 11/03/2021    CREATININE 0.6 11/03/2021    GFRAA >60 06/07/2019    LABGLOM >90 11/03/2021    GLUCOSE 226 11/03/2021    GLUCOSE 102 10/30/2015    CALCIUM 9.0 11/03/2021       Hepatic Function Panel:    Lab Results   Component Value Date    ALKPHOS 69 06/07/2019    ALT 48 06/07/2019    AST 26 06/07/2019    PROT 7.1 06/07/2019    BILITOT 0.42 06/07/2019    BILIDIR 0.11 06/07/2019    IBILI 0.31 06/07/2019    LABALBU 4.0 06/07/2019       Magnesium:    Lab Results   Component Value Date    MG 2.0 06/07/2019       PT/INR:    Lab Results   Component Value Date    INR 1.06 04/01/2013       HgBA1c:    Lab Results   Component Value Date    LABA1C 11.0 11/04/2021       FLP:    Lab Results   Component Value Date    TRIG 116 06/07/2019    HDL 48 06/07/2019    LDLCALC 56 10/01/2013       TSH:    Lab Results   Component Value Date    TSH 2.662 04/01/2013         Assessment/Plan:  · Preoperative risk assessment for vascular surgery. The patient requiring bilateral lower extremity surgical revascularization. 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  · CTS consulted - operative revascularization planned  · CAD  · Prior stent placement - PCI with stent mid LAD 2013  · Not taking any of his medications for past year  · Medications restarted with this admission - re-initiated at lower than 2018 prescribed doses - titrate up as tolerated  · Coreg 12.5 BID (ordered as 25 BID in 2018)  · Lisinopril 5 QD (was 10)  · Lasix 20 mg po QD (was 40 QD)  · Echo 11/4/21: EF 55%, nml LV size and fx, nml RV, no significant findings   · Lexiscan stress: 11/5/21 - results pending  · [employer] stress; 4/13  · Imaging note fixed defect inferior wall; no evidence of ischemia, EF 30%  · HTN  · BP currently well-controlled on medications  · HLD  · Lipid panel 11/5 - results pending  · COPD  · Pulmonary evaluation  · Tobacco abuse disorder  · Smoking cessation education materials  · Medical non-compliance with medications and follow-up   · Last seen in Cardiology office for follow-up 6/10/2019    No anginal sx. Echo with EF 55%; no significant findings. Lexiscan stress completed - results pending.  If stress test without evidence of ischemia then ok with Cardiology to proceed with surgery as planned. If stress (+) ischemia may warrant heart cath prior to OR.       Electronically signed by TANIA Johnston CNP on 11/5/2021 at 10:38 AM

## 2021-11-05 NOTE — PROGRESS NOTES
CT/CV Surgery Progress Note    2021 7:23 AM  Surgeon:  Dr. Jo Ann Zimmer     Subjective: Mr. Guy Russo is resting comfortably in bed on 2L NC, alert, and in no acute distress. Pt denies chest pressure, SOB, fever,chills, N/V/D. He was admitted through the emergency department yesterday with painful, bluish discoloration of the right toes. He had a CTA of abdomen, pelvis and runoff 21. This showed occluded superficial femoral artery bilaterally then IV heparin was started. He notes some improvement of symptoms and is currently on ASA, Plavix and Xarelto. He was seen by Cardiology for clearance and is currently receiving work up for probable outpatient R fem-pop bypass. I/O last 3 completed shifts: In: 540 [P.O.:520; I.V.:20]  Out: 0     Vital Signs: /60   Pulse 70   Temp 98.6 °F (37 °C) (Oral)   Resp 16   Ht 5' 8\" (1.727 m)   Wt 223 lb 8.7 oz (101.4 kg)   SpO2 95%   BMI 33.99 kg/m²    Temp (24hrs), Av.1 °F (36.7 °C), Min:97.4 °F (36.3 °C), Max:98.6 °F (37 °C)    Labs:   CBC: Recent Labs     21  1833 21  0217 21  0616   WBC 8.6 9.1 10.9*   HGB 13.8* 13.8* 13.2*   HCT 39.3* 39.5* 38.2*   MCV 95.6* 96.6* 98.2*    204 192   APTT 27.0  --   --      BMP:   Recent Labs     21  1600 21  0656   * 135   K 4.1 4.1   CL 94* 98   CO2 27 26   BUN 10 10   CREATININE 0.7 0.6     Imaging:  Chest X-Ray: 21  Impression       No acute intrathoracic process.                   **This report has been created using voice recognition software. It may contain minor errors which are inherent in voice recognition technology. **       Final report electronically signed by Dr. Sun Laguerre on 2021 6:26 PM         Intake/Output Summary (Last 24 hours) at 2021 0723  Last data filed at 2021 0402  Gross per 24 hour   Intake 540 ml   Output 0 ml   Net 540 ml       Scheduled Meds:    clotrimazole   Topical BID    lisinopril  5 mg Oral BID    carvedilol 12.5 mg Oral BID WC    furosemide  20 mg Oral Daily    ipratropium-albuterol  1 ampule Inhalation Q4H WA    methylPREDNISolone  40 mg IntraVENous Daily    nicotine  1 patch TransDERmal Daily    rivaroxaban  2.5 mg Oral BID    aspirin  81 mg Oral Daily    atorvastatin  20 mg Oral Daily    clopidogrel  75 mg Oral Daily    sodium chloride flush  5-40 mL IntraVENous 2 times per day     ROS: All neg unless specifically mentioned in subjective section. Exam:  General Appearance: alert ,conversing, in no acute distress  Cardiovascular: normal rate, regular rhythm, normal S1 and S2, no murmurs, rubs, clicks, or gallops  Pulmonary/Chest: clear to auscultation bilaterally- no wheezes, rales or rhonchi, normal air movement, no respiratory distress  Neurological: alert, oriented, normal speech, no focal findings or movement disorder noted. Ext:  Right and Left DPs and PTs detected on doppler. Right 2nd toe with improvement of discoloration. Assessment:   Patient Active Problem List   Diagnosis    NSTEMI (non-ST elevated myocardial infarction) ([facility] [address]    Hyperglycemia    Smoker    Alcohol abuse    SOB (shortness of breath)    Sleep apnea, obstructive    Fatigue    Daytime somnolence    Depression    Heart disease    Obesity (BMI 30.0-34. 9)    Presence of stent in LAD coronary artery stent    Cardiomyopathy ([facility] [address]    Essential hypertension    Coronary artery disease involving native coronary artery of native heart without angina pectoris    Pure hypercholesterolemia    Chronic diastolic congestive heart failure (HCC)    SOB (shortness of breath) on exertion    Ischemia of right lower extremity     Plan:   1. Discharge pt home on Xarelto 2.5mg BID once cardiac evaluation is complete. Follow up with Dr. Coby Seth in 2 weeks.     The plan of care was discussed in detail with Dr. Coby Seth     Electronically signed by Eli Marcos PA-C on 11/5/2021 at 7:23 AM

## 2021-11-05 NOTE — PROGRESS NOTES
Off unit for testing . Morning vital signs will be taken when he comes back. He was off unit during shift report.

## 2021-11-05 NOTE — PROGRESS NOTES
Hospitalist      Patient:  Juris Schilder    Unit/Bed:4K-27/027-A  YOB: 1955  MRN: [id number]   Acct: [de-identified]     PCP: TANIA Lora CNP  Date of Admission: 11/2/2021        Assessment and Plan:        1. 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 Diminutive flow in the tibial and peroneal arteries bilaterally, continue heparin drip and resume aspirin and Plavix vascular surgery has been consulted  3. Moderate areas of stenosis in the iliac and common femoral arteries, resume aspirin and Plavix, vascular surgery has been consulted  4. Essential hypertension we will continue home medications  5. Cardiomyopathy/chronic diastolic heart failure, will continue with Coreg and Lasix  6. Noncompliance with medication we will discuss the significance of noncompliance  7. hyperglycemia    11.4.2021 patient seen this a.m. would like to go home, await cardiothoracic input on discharge today. 11.5.2021 patient off floor for stress test.  Cardiology and pulmonary consults noted will obtain home oxygen eval, will start on oral agent for dm, diet teaching    CC: Right foot pain    HPI: This patient presented to the emergency department 4-day history of right foot pain and color change, the patient was found to have ischemic changes in the distal right foot. Patient underwent CTA in the above is resulted. Patient has not taken his aspirin Plavix or statin for several months. Patient continues to smoke. ROS (14 point review of systems completed. Pertinent positives noted. Otherwise ROS is negative) :  No complaints today, feet are warm    PMH:  Per HPI and       Diagnosis Date    CAD (coronary artery disease)     Chronic diastolic congestive heart failure ([facility] [address] 3/19/2018    Hyperlipidemia     Hypertension     MI (myocardial infarction) ([facility] [address] 03/21/2013    Tobacco abuse      SHX:        Procedure Laterality Date    [facility]    CORONARY ANGIOPLASTY WITH STENT PLACEMENT  10-1-13     FHX:       Problem Relation Age of Onset    Diabetes Mother     Stroke Mother     Heart Disease Father     High Blood Pressure Brother      SOCHX:   Social History     Socioeconomic History    Marital status: Single     Spouse name: None    Number of children: None    Years of education: None    Highest education level: None   Occupational History    None   Tobacco Use    Smoking status: Current Every Day Smoker     Packs/day: 1.50     Years: 40.00     Pack years: 60.00     Types: Cigarettes    Smokeless tobacco: Never Used   Vaping Use    Vaping Use: Never used   Substance and Sexual Activity    Alcohol use: Yes     Comment: 12 pk per day/ has cut down     Drug use: No    Sexual activity: None   Other Topics Concern    None   Social History Narrative    None     Social Determinants of Health     Financial Resource Strain:     Difficulty of Paying Living Expenses:    Food Insecurity:     Worried About Running Out of Food in the Last Year:     Ran Out of Food in the Last Year:    Transportation Needs:     Lack of Transportation (Medical):  Lack of Transportation (Non-Medical):    Physical Activity:     Days of Exercise per Week:     Minutes of Exercise per Session:    Stress:     Feeling of Stress :    Social Connections:     Frequency of Communication with Friends and Family:     Frequency of Social Gatherings with Friends and Family:     Attends [employer]ism Services:     Active Member of Clubs or Organizations:     Attends Club or Organization Meetings:     Marital Status:    Intimate Partner Violence:     Fear of Current or Ex-Partner:     Emotionally Abused:     Physically Abused:     Sexually Abused: Allergies: Patient has no known allergies.   Medications:     sodium chloride        clotrimazole   Topical BID    lisinopril  5 mg Oral BID    carvedilol  12.5 mg Oral BID WC    furosemide  20 mg Oral Daily    ipratropium-albuterol  1 ampule Inhalation Q4H WA    methylPREDNISolone  40 mg IntraVENous Daily    nicotine  1 patch TransDERmal Daily    rivaroxaban  2.5 mg Oral BID    aspirin  81 mg Oral Daily    atorvastatin  20 mg Oral Daily    clopidogrel  75 mg Oral Daily    sodium chloride flush  5-40 mL IntraVENous 2 times per day     Prior to Admission medications    Medication Sig Start Date End Date Taking? Authorizing Provider   nicotine (NICODERM CQ) 21 MG/24HR Place 1 patch onto the skin daily 11/4/21  Yes Doristine Payment, DO   rivaroxaban (XARELTO) 2.5 MG TABS tablet Take 1 tablet by mouth 2 times daily 11/4/21  Yes Doristine Payment, DO   atorvastatin (LIPITOR) 20 MG tablet Take 1 tablet by mouth daily 11/5/21  Yes Doristine Payment, DO   clopidogrel (PLAVIX) 75 MG tablet Take 1 tablet by mouth daily 11/5/21  Yes Doristine Payment, DO   lisinopril (PRINIVIL;ZESTRIL) 10 MG tablet Take 1 tablet by mouth 2 times daily 11/4/21  Yes Doristine Payment, DO   aspirin 81 MG chewable tablet Take 1 tablet by mouth daily Indications: Cardiovascular Risk Reduction, blood thinner 11/5/21  Yes Doristine Payment, DO   carvedilol (COREG) 25 MG tablet Take 1 tablet by mouth 2 times daily (with meals) 11/4/21  Yes Doristine Payment, DO   furosemide (LASIX) 40 MG tablet Take 1 tablet by mouth daily 11/4/21  Yes Doristine Payment, DO      PHYSICAL EXAM:    /60   Pulse 70   Temp 98.6 °F (37 °C) (Oral)   Resp 16   Ht 5' 8\" (1.727 m)   Wt 223 lb 8.7 oz (101.4 kg)   SpO2 95%   BMI 33.99 kg/m²     General appearance:  No apparent distress, appears stated age and cooperative. HEENT:  Normal cephalic, atraumatic without obvious deformity. Pupils equal, round, and reactive to light. Extra ocular muscles intact. Conjunctivae/corneas clear. Neck: Supple, with full range of motion. no jugular venous distention. Trachea midline. no carotid bruits  Respiratory:  Normal respiratory effort. Clear to auscultation, bilaterally without Rales/Wheezes/Rhonchi. Breath sounds equal bilaterally  Cardiovascular:  Regular rate and rhythm with normal S1/S2 without murmurs, rubs or gallops. PMI non displaced  Abdomen: Soft, non-tender, non-distended with normal bowel sounds. No guarding, rebound. Musculoskeletal: Markedly decreased pulses in lower extremities, feet are warm. Skin: Right foot warm. Neurologic:  Neurovascularly intact without any focal sensory/motor deficits. Cranial nerves: II-XII intact, grossly non-focal.  Psychiatric:  Alert and oriented, thought content appropriate, normal insight  Capillary Refill: Decreased in lower extremities  Peripheral Pulses: +2 palpable, equal bilaterally upper and lower extremities  Lymphatics: no lymphadenopathy    Data: (All radiographs, tracings, PFTs, and imaging are personally viewed and interpreted unless otherwise noted).    Recent Labs     11/02/21  1833 11/03/21  0217 11/04/21  0616   WBC 8.6 9.1 10.9*   HGB 13.8* 13.8* 13.2*   HCT 39.3* 39.5* 38.2*    204 192     Recent Labs     11/02/21  1600 11/03/21  0656   * 135   K 4.1 4.1   CL 94* 98   CO2 27 26   BUN 10 10   CREATININE 0.7 0.6   CALCIUM 9.5 9.0     No results for input(s): AST, ALT, BILIDIR, BILITOT, ALKPHOS in the last 72 hours. No results for input(s): INR in the last 72 hours. No results for input(s): Lyndon Shackle in the last 72 hours. Radiology reports-   CTA ABDOMINAL AORTA W BILAT RUNOFF W WO CONTRAST    Result Date: 11/3/2021  PROCEDURE: CTA ABDOMINAL AORTA W BILAT RUNOFF W WO CONTRAST CLINICAL INFORMATION: WET READ FINDINGS: Limited images and incomplete reconstructions at the time of study review. No flow restrictive disease. Extensive atherosclerotic vascular calcifications. A final report will be generated following submission of the entire study.   Shanti Aguila ON 11/2/2021

## 2021-11-05 NOTE — CONSULTS
[address] [place] [address]                                  CONSULTATION    PATIENT NAME: Deana Hopson                   :        1955  MED REC NO:   [id number]                           ROOM:       3901  ACCOUNT NO:   [de-identified]                           ADMIT DATE: 2021  PROVIDER:     Carol Potter. Nirmal Hernandez M.D.    Dora Lockettler:  2021    REASON FOR CONSULTATION:  Preoperative risk assessment for vascular  surgery. The patient requiring bilateral lower extremity surgical  revascularization. In view of that, preoperative risk assessment was  required. HISTORY OF PRESENT ILLNESS:  This is a 61-year-old  gentleman  who presented to the emergency room with four-day history of right foot  pain and discoloration of the right toes and came to the emergency room  and he has history of intermittent claudication on the right thigh and  right calf. So, in the emergency room, he was found to have ischemic  changes in the distal right foot, particularly in the toes and basically  weak dorsalis pedis and posterior tibialis and the patient had CT of the  abdomen and pelvis with runoff and that showed occlusion of superficial  femoral artery bilaterally. Intravenous heparin was started and  vascular surgeon has been consulted. Vascular surgeon decided for  considering for bilateral revascularization of both lower extremities as  soon as possible and hence, for preoperative risk assessment Cardiology  evaluation was called. Revascularization was planned to be done as  outpatient. Of note, this patient is known to have coronary artery disease and had  previous stent and lost to follow up with Cardiology. Last seen his  cardiologist in 2018 and has been off his medications. He is not taking  any of his cardiac medications for almost over a year. Denied having  chest pain.   He has shortness of breath on exertion and has cough. He  has been coughing and chronic and he has history of COPD. He has no  orthopnea or paroxysmal nocturnal dyspnea. No leg swelling. He used to  be taking diuretics. He is not on any diuretics now on this admission  and has been taking no medication for over a year and no leg edema. On  this admission, all his home medications with the old dose which has  been over a year have been restarted yesterday. So, his activity is  limited because of intermittent claudication and shortness of breath  from COPD. He is still smoking. REVIEW OF SYSTEMS:  Negative except the above-mentioned ones. PAST MEDICAL HISTORY:  Coronary artery disease, status post previous  stent in 2013 after myocardial infarction; congestive heart failure;  hypertension; hyperlipidemia; tobacco abuse; COPD. He has been off  diuretics for a while. PAST SURGICAL HISTORY:  Cardiac catheterization and intervention in  2013. FAMILY HISTORY:  Positive for heart disease and stroke. SOCIAL HISTORY:  He is a current smoker, 1.5 packs a day. Occasional  alcohol consumption. No drug use. ALLERGIES:  NO KNOWN DRUG ALLERGIES. HOME MEDICATIONS:  Prior to admission, none, but he used to be on  aspirin 81, Lipitor 20, Coreg 25 twice a day. He used to be on Plavix  75 daily, lisinopril 10 mg twice a day and was also on furosemide 40 mg  daily at least, but has been on furosemide long time ago, has stopped  quite a while ago, but he was not taking any of these medications at  least for over a year. PHYSICAL EXAMINATION:  GENERAL:  Looks sick, in mild respiratory distress. VITAL SIGNS:  Blood pressure 124/60, pulse rate 66, respiratory rate 16,  temperature 98. HEENT:  Pink conjunctivae. Anicteric sclerae. Pupils are equal and  reactive bilaterally to light. NECK:  No lymphadenopathy. No goiter. No JVD. CHEST:  Bilateral diffuse wheezes with some crackles.   CARDIOVASCULAR:  Arteries are felt; carotid, femoral.  S1 and S2 well  heard. No murmur or gallop rhythm. ABDOMEN:  Soft, nontender, nondistended. Bowel sounds are positive. GENITOURINARY:  No CVA, flank, or suprapubic tenderness. LOCOMOTOR:  No cyanosis, clubbing, or muscle or joint tenderness. Poor  dorsalis pedis and posterior tibialis. Discoloration of the toes on the  right has been noted. SKIN:  No rashes, ulcers, or nodules. CNS:  Alert, awake, and oriented to time, person, and place. Cranial  nerves are intact. Sensation is intact to light touch and pain. Motor:  Normal muscle strength and tone. Appropriate mood and affect. WORKUP:  EKG:  Sinus rhythm with right bundle-branch block. Old  inferior infarction. No acute EKG abnormality. No change from before. Has an EKG in 2018 and finding is unchanged. Echocardiogram done in 2018 had ejection fraction of 60% and in 2016,  had an ejection fraction 50% and 2013, had an ejection fraction of 45%  and further earlier in 2013, 03/2013, ejection fraction was 25% to 30%. There is significant improvement. He had cardiac catheterization in 7031, showed certainly total occlusion  of RCA with collateral, left main patent and LAD, 60% to 70% in  circumflex, his LAD has been stented and circumflex, it is trifurcating  to obtuse marginalis. There is a lesion of 70% with luminal narrowing  in one of the obtuse marginalis. So basically, optimal medical  management recommended for that and LAD intervention was performed  later. No recent stress test.    BLOOD CHEMISTRY:  Sodium 135, potassium 4.1, BUN 10, creatinine 0.6. Troponin done. Liver function tests not done. Lipid panel not done on  this admission. HEMATOLOGY:  White blood cells 10, hemoglobin 13.2, platelets 090. No chest x-ray done on this admission.     CT of the abdomen with peripheral runoff, complete occlusion of the  proximal superficial femoral arteries bilaterally; has been with  diminutive flow in the tibial and peroneal bilaterally. ASSESSMENT:  1. Preoperative risk assessment for vascular surgery. 2.  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 No active pain at rest.  3.  Coronary artery disease, status post LAD stent in 2013 and medical  therapy for circumflex and RCA was 100% occluded, _____ collateral from  the left to the right. 4.  History of ischemic cardiomyopathy in 2013, improved, later  normalized EF. 5.  History of congestive heart failure and has been off diuretics for  over a year by himself and no evidence of congestion at this level. 6.  COPD with wheezes. 7.  History of tobacco abuse. 8.  Hypertension. 9.  Hyperlipidemia. 10.  Noncompliance with followup and medications totally. PLAN AND RECOMMENDATIONS:  1. We will do lipid panel, liver function tests as baseline prior to  commencement of his home medications. Apparently, has been started on  statin, but we need to get lipid panel and liver functions as a baseline  for the followup. 2.  We will get an echocardiogram and functional study and assess his  risk for surgery. 3.  Basically, his home dose of medication has been started to the dose  that he has been taking in 2018. So, I will prefer to start as a lower  dose and progressively go up. He has been on Coreg 25 twice a day  before, so I will decrease now to 12.5 twice a day and decrease  lisinopril to 5 daily. I will decrease Lasix to 20 daily and I will  check chest x-ray and BNP. If the chest x-ray and BNP are within normal  limits, probably we may consider to stop the Lasix. 4.  He has COPD with wheezes now. His COPD needs to be optimized. As  the patient is noncompliant, I will use this opportunity to have a  pulmonary evaluation and optimize his pulmonary function status should  the surgery being done soon.   5. So, I discussed with the patient and certainly, the risk is not low  or even not moderate. Certainly, the patient has significant history of  coronary artery disease with previous stent placement, significant COPD;  putting altogether, the patient has certainly moderate to high risk of  having any major vascular surgery at this level. Based on the  echocardiogram and functional study, we will gauge further care. Moreover, proper risk assessment will come after the functional study  and the echocardiogram findings. 6.  Provided the echocardiogram and Lexiscan within normal limits, the  patient may proceed with moderate risk of having major cardiovascular  event preoperatively around 5% actually in this gentleman. 7.  Certainly, should his stress test and echo be acceptable, certainly  his pulmonary status needs to be optimized prior to surgery since I put  in cardiopulmonary consultation. Thank you for allowing me to participate in the care of this patient. I  will follow up with you. Compliance with medications and followup has  been advised to the patient. Anjelica Garcia.  Soha Zhang M.D.    D: 11/04/2021 38:18:55       T: 11/04/2021 21:58:55     [email or web address]  Job#: [id number]     Doc#: [id number]    CC:

## 2021-11-05 NOTE — CARE COORDINATION
11/5/21, 3:16 PM EDT    Home with room mate Deborah Gonzalez. Patient goals/plan/ treatment preferences discussed by  and . Patient goals/plan/ treatment preferences reviewed with patient/ family. Patient/ family verbalize understanding of discharge plan and are in agreement with goal/plan/treatment preferences. Understanding was demonstrated using the teach back method. AVS provided by RN at time of discharge, which includes all necessary medical information pertaining to the patients current course of illness, treatment, post-discharge goals of care, and treatment preferences.         IMM Letter  IMM Letter given to Patient/Family/Significant other/Guardian/POA/by[de-identified] intake  IMM Letter date given[de-identified] 11/04/21  IMM Letter time given[de-identified] 3177

## 2021-11-06 VITALS
HEIGHT: 68 IN | DIASTOLIC BLOOD PRESSURE: 61 MMHG | TEMPERATURE: 97.8 F | OXYGEN SATURATION: 93 % | WEIGHT: 211.64 LBS | RESPIRATION RATE: 18 BRPM | HEART RATE: 69 BPM | BODY MASS INDEX: 32.08 KG/M2 | SYSTOLIC BLOOD PRESSURE: 120 MMHG

## 2021-11-06 LAB
ERYTHROCYTE [DISTWIDTH] IN BLOOD BY AUTOMATED COUNT: 11.6 % (ref 11.5–14.5)
ERYTHROCYTE [DISTWIDTH] IN BLOOD BY AUTOMATED COUNT: 41.2 FL (ref 35–45)
GLUCOSE BLD-MCNC: 202 MG/DL (ref 70–108)
GLUCOSE BLD-MCNC: 233 MG/DL (ref 70–108)
HCT VFR BLD CALC: 40.5 % (ref 42–52)
HEMOGLOBIN: 13.9 GM/DL (ref 14–18)
MCH RBC QN AUTO: 33.3 PG (ref 26–33)
MCHC RBC AUTO-ENTMCNC: 34.3 GM/DL (ref 32.2–35.5)
MCV RBC AUTO: 97.1 FL (ref 80–94)
PLATELET # BLD: 241 THOU/MM3 (ref 130–400)
PMV BLD AUTO: 11.1 FL (ref 9.4–12.4)
RBC # BLD: 4.17 MILL/MM3 (ref 4.7–6.1)
WBC # BLD: 17.6 THOU/MM3 (ref 4.8–10.8)

## 2021-11-06 PROCEDURE — 36415 COLL VENOUS BLD VENIPUNCTURE: CPT

## 2021-11-06 PROCEDURE — 94760 N-INVAS EAR/PLS OXIMETRY 1: CPT

## 2021-11-06 PROCEDURE — 6370000000 HC RX 637 (ALT 250 FOR IP): Performed by: INTERNAL MEDICINE

## 2021-11-06 PROCEDURE — 99233 SBSQ HOSP IP/OBS HIGH 50: CPT | Performed by: INTERNAL MEDICINE

## 2021-11-06 PROCEDURE — 85027 COMPLETE CBC AUTOMATED: CPT

## 2021-11-06 PROCEDURE — 6370000000 HC RX 637 (ALT 250 FOR IP): Performed by: THORACIC SURGERY (CARDIOTHORACIC VASCULAR SURGERY)

## 2021-11-06 PROCEDURE — 2580000003 HC RX 258: Performed by: PHYSICIAN ASSISTANT

## 2021-11-06 PROCEDURE — 94640 AIRWAY INHALATION TREATMENT: CPT

## 2021-11-06 PROCEDURE — 99232 SBSQ HOSP IP/OBS MODERATE 35: CPT | Performed by: INTERNAL MEDICINE

## 2021-11-06 PROCEDURE — 6370000000 HC RX 637 (ALT 250 FOR IP): Performed by: NURSE PRACTITIONER

## 2021-11-06 PROCEDURE — 6370000000 HC RX 637 (ALT 250 FOR IP): Performed by: PHYSICIAN ASSISTANT

## 2021-11-06 PROCEDURE — 2700000000 HC OXYGEN THERAPY PER DAY

## 2021-11-06 PROCEDURE — 82948 REAGENT STRIP/BLOOD GLUCOSE: CPT

## 2021-11-06 RX ORDER — FUROSEMIDE 20 MG/1
20 TABLET ORAL DAILY
Qty: 60 TABLET | Refills: 3 | Status: CANCELLED | OUTPATIENT
Start: 2021-11-06

## 2021-11-06 RX ORDER — CARVEDILOL 12.5 MG/1
12.5 TABLET ORAL 2 TIMES DAILY WITH MEALS
Qty: 60 TABLET | Refills: 3 | Status: SHIPPED | OUTPATIENT
Start: 2021-11-06 | End: 2021-11-30 | Stop reason: SDUPTHER

## 2021-11-06 RX ORDER — LISINOPRIL 5 MG/1
5 TABLET ORAL 2 TIMES DAILY
Qty: 30 TABLET | Refills: 3 | Status: SHIPPED | OUTPATIENT
Start: 2021-11-06 | End: 2021-11-30 | Stop reason: SDUPTHER

## 2021-11-06 RX ORDER — FUROSEMIDE 40 MG/1
20 TABLET ORAL DAILY
Qty: 30 TABLET | Refills: 1 | Status: CANCELLED | OUTPATIENT
Start: 2021-11-06

## 2021-11-06 RX ORDER — GLIPIZIDE 10 MG/1
10 TABLET ORAL
Qty: 60 TABLET | Refills: 3 | Status: SHIPPED | OUTPATIENT
Start: 2021-11-07

## 2021-11-06 RX ADMIN — PREDNISONE 40 MG: 20 TABLET ORAL at 08:23

## 2021-11-06 RX ADMIN — FUROSEMIDE 20 MG: 20 TABLET ORAL at 08:23

## 2021-11-06 RX ADMIN — SODIUM CHLORIDE, PRESERVATIVE FREE 10 ML: 5 INJECTION INTRAVENOUS at 08:19

## 2021-11-06 RX ADMIN — IPRATROPIUM BROMIDE AND ALBUTEROL SULFATE 1 AMPULE: .5; 3 SOLUTION RESPIRATORY (INHALATION) at 07:51

## 2021-11-06 RX ADMIN — CARVEDILOL 12.5 MG: 25 TABLET, FILM COATED ORAL at 08:23

## 2021-11-06 RX ADMIN — ASPIRIN 81 MG: 81 TABLET, CHEWABLE ORAL at 08:22

## 2021-11-06 RX ADMIN — HYDROCODONE BITARTRATE AND ACETAMINOPHEN 1 TABLET: 5; 325 TABLET ORAL at 00:33

## 2021-11-06 RX ADMIN — RIVAROXABAN 2.5 MG: 2.5 TABLET, FILM COATED ORAL at 08:24

## 2021-11-06 RX ADMIN — LISINOPRIL 5 MG: 10 TABLET ORAL at 08:24

## 2021-11-06 RX ADMIN — ATORVASTATIN CALCIUM 20 MG: 20 TABLET, FILM COATED ORAL at 08:24

## 2021-11-06 RX ADMIN — CLOTRIMAZOLE: 10 CREAM TOPICAL at 08:18

## 2021-11-06 RX ADMIN — CLOPIDOGREL BISULFATE 75 MG: 75 TABLET ORAL at 08:22

## 2021-11-06 RX ADMIN — GLIPIZIDE 10 MG: 10 TABLET ORAL at 06:13

## 2021-11-06 RX ADMIN — ACETAMINOPHEN 650 MG: 325 TABLET ORAL at 08:22

## 2021-11-06 ASSESSMENT — PAIN - FUNCTIONAL ASSESSMENT: PAIN_FUNCTIONAL_ASSESSMENT: PREVENTS OR INTERFERES SOME ACTIVE ACTIVITIES AND ADLS

## 2021-11-06 ASSESSMENT — PAIN DESCRIPTION - LOCATION: LOCATION: TOE (COMMENT WHICH ONE)

## 2021-11-06 ASSESSMENT — PAIN DESCRIPTION - FREQUENCY: FREQUENCY: CONTINUOUS

## 2021-11-06 ASSESSMENT — PAIN SCALES - GENERAL
PAINLEVEL_OUTOF10: 0
PAINLEVEL_OUTOF10: 4
PAINLEVEL_OUTOF10: 4
PAINLEVEL_OUTOF10: 7

## 2021-11-06 ASSESSMENT — PAIN DESCRIPTION - PAIN TYPE: TYPE: ACUTE PAIN

## 2021-11-06 ASSESSMENT — PAIN DESCRIPTION - ONSET: ONSET: ON-GOING

## 2021-11-06 ASSESSMENT — PAIN DESCRIPTION - PROGRESSION: CLINICAL_PROGRESSION: NOT CHANGED

## 2021-11-06 ASSESSMENT — PAIN DESCRIPTION - DESCRIPTORS: DESCRIPTORS: THROBBING

## 2021-11-06 ASSESSMENT — PAIN DESCRIPTION - ORIENTATION: ORIENTATION: RIGHT

## 2021-11-06 NOTE — PROGRESS NOTES
Patient left floor with staff waiting for cab service to  and transport home, patient confirmed roommate will be home to let him into apartment.

## 2021-11-06 NOTE — PROGRESS NOTES
Hospitalist      Patient:  Zachery Hollingsworth    Unit/Bed:4K-27/027-A  YOB: 1955  MRN: [id number]   Acct: [de-identified]     PCP: TANIA Walls CNP  Date of Admission: 11/2/2021        Assessment and Plan:        1. 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 Diminutive flow in the tibial and peroneal arteries bilaterally, continue heparin drip and resume aspirin and Plavix vascular surgery has been consulted  3. Moderate areas of stenosis in the iliac and common femoral arteries, resume aspirin and Plavix, vascular surgery has been consulted  4. Essential hypertension we will continue home medications  5. Cardiomyopathy/chronic diastolic heart failure, will continue with Coreg and Lasix  6. Noncompliance with medication we will discuss the significance of noncompliance  7. hyperglycemia: Discussed at length of diet control does not want to go on insulin, wants to try pills, will send to diabetes clinic    11.4.2021 patient seen this a.m. would like to go home, await cardiothoracic input on discharge today. 11.5.2021 patient off floor for stress test.  Cardiology and pulmonary consults noted will obtain home oxygen eval, will start on oral agent for dm, diet teaching    11.6.2021 patient's stress test and Lexiscan's were negative for ischemia still waiting on home O2 eval, possibly discharge today. Increased WBC secondary to steroids    CC: Right foot pain    HPI: This patient presented to the emergency department 4-day history of right foot pain and color change, the patient was found to have ischemic changes in the distal right foot. Patient underwent CTA in the above is resulted. Patient has not taken his aspirin Plavix or statin for several months. Patient continues to smoke. ROS (14 point review of systems completed. Pertinent positives noted.  Otherwise ROS is negative) : No complaints today, feet are warm    PMH:  Per HPI and       Diagnosis Date    CAD (coronary artery disease)     Chronic diastolic congestive heart failure ([facility] [address] 3/19/2018    Hyperlipidemia     Hypertension     MI (myocardial infarction) ([facility] 03/21/2013    Tobacco abuse      SHX:        Procedure Laterality Date    [facility]  3/[facility]    CORONARY ANGIOPLASTY WITH STENT PLACEMENT  10-1-13     FHX:       Problem Relation Age of Onset    Diabetes Mother     Stroke Mother     Heart Disease Father     High Blood Pressure Brother      SOCHX:   Social History     Socioeconomic History    Marital status: Single     Spouse name: None    Number of children: None    Years of education: None    Highest education level: None   Occupational History    None   Tobacco Use    Smoking status: Current Every Day Smoker     Packs/day: 1.50     Years: 40.00     Pack years: 60.00     Types: Cigarettes    Smokeless tobacco: Never Used   Vaping Use    Vaping Use: Never used   Substance and Sexual Activity    Alcohol use: Yes     Comment: 12 pk per day/ has cut down     Drug use: No    Sexual activity: None   Other Topics Concern    None   Social History Narrative    None     Social Determinants of Health     Financial Resource Strain:     Difficulty of Paying Living Expenses: Not on file   Food Insecurity:     Worried About Running Out of Food in the Last Year: Not on file    Karolina of Food in the Last Year: Not on file   Transportation Needs:     Lack of Transportation (Medical): Not on file    Lack of Transportation (Non-Medical):  Not on file   Physical Activity:     Days of Exercise per Week: Not on file    Minutes of Exercise per Session: Not on file   Stress:     Feeling of Stress : Not on file   Social Connections:     Frequency of Communication with Friends and Family: Not on file    Frequency of Social Gatherings with Friends and Family: Not on file    Attends Yarsani Services: Not on file    Active Member of Clubs or Organizations: Not on file    Attends Club or Organization Meetings: Not on file    Marital Status: Not on file   Intimate Partner Violence:     Fear of Current or Ex-Partner: Not on file    Emotionally Abused: Not on file    Physically Abused: Not on file    Sexually Abused: Not on file   Housing Stability:     Unable to Pay for Housing in the Last Year: Not on file    Number of [place] in the Last Year: Not on file    Unstable Housing in the Last Year: Not on file      Allergies: Patient has no known allergies. Medications:     dextrose      sodium chloride        glipiZIDE  10 mg Oral QAM AC    predniSONE  40 mg Oral Daily    ipratropium-albuterol  1 ampule Inhalation BID    clotrimazole   Topical BID    lisinopril  5 mg Oral BID    carvedilol  12.5 mg Oral BID WC    furosemide  20 mg Oral Daily    nicotine  1 patch TransDERmal Daily    rivaroxaban  2.5 mg Oral BID    aspirin  81 mg Oral Daily    atorvastatin  20 mg Oral Daily    clopidogrel  75 mg Oral Daily    sodium chloride flush  5-40 mL IntraVENous 2 times per day     Prior to Admission medications    Medication Sig Start Date End Date Taking?  Authorizing Provider   umeclidinium-vilanterol (ANORO ELLIPTA) 62.5-25 MCG/INH AEPB inhaler Inhale 1 puff into the lungs daily 11/5/21 11/5/22 Yes TANIA Andrew CNP   albuterol sulfate HFA (VENTOLIN HFA) 108 (90 Base) MCG/ACT inhaler Inhale 2 puffs into the lungs every 6 hours as needed for Wheezing or Shortness of Breath 11/5/21  Yes TANIA Tovar CNP   predniSONE (DELTASONE) 20 MG tablet Take 2 tablets by mouth daily for 3 doses 11/6/21 11/9/21 Yes TANIA Mon CNP   nicotine (NICODERM CQ) 21 MG/24HR Place 1 patch onto the skin daily 11/4/21  Yes Gonzalo Parker,    rivaroxaban (XARELTO) 2.5 MG TABS tablet Take 1 tablet by mouth 2 times daily 11/4/21  Yes Gonzalo Parker, DO   atorvastatin (LIPITOR) 20 MG tablet Take 1 tablet by mouth daily 11/5/21  Yes Jeremias Guallpa DO   clopidogrel (PLAVIX) 75 MG tablet Take 1 tablet by mouth daily 11/5/21  Yes Jeremias Guallpa DO   lisinopril (PRINIVIL;ZESTRIL) 10 MG tablet Take 1 tablet by mouth 2 times daily 11/4/21  Yes Jeremias Guallpa DO   aspirin 81 MG chewable tablet Take 1 tablet by mouth daily Indications: Cardiovascular Risk Reduction, blood thinner 11/5/21  Yes Jeremias Guallpa DO   carvedilol (COREG) 25 MG tablet Take 1 tablet by mouth 2 times daily (with meals) 11/4/21  Yes Jeremias Guallpa DO   furosemide (LASIX) 40 MG tablet Take 1 tablet by mouth daily 11/4/21  Yes Jeremias Guallpa DO      PHYSICAL EXAM:    BP (!) 112/57   Pulse 61   Temp 97.9 °F (36.6 °C) (Oral)   Resp 19   Ht 5' 8\" (1.727 m)   Wt 211 lb 10.3 oz (96 kg)   SpO2 98%   BMI 32.18 kg/m²     General appearance:  No apparent distress, appears stated age and cooperative. HEENT:  Normal cephalic, atraumatic without obvious deformity. Pupils equal, round, and reactive to light. Extra ocular muscles intact. Conjunctivae/corneas clear. Neck: Supple, with full range of motion. no jugular venous distention. Trachea midline. no carotid bruits  Respiratory:  Normal respiratory effort. Clear to auscultation, bilaterally without Rales/Wheezes/Rhonchi. Breath sounds equal bilaterally  Cardiovascular:  Regular rate and rhythm with normal S1/S2 without murmurs, rubs or gallops. PMI non displaced  Abdomen: Soft, non-tender, non-distended with normal bowel sounds. No guarding, rebound. Musculoskeletal: Markedly decreased pulses in lower extremities, feet are warm. Skin: Right foot warm. Neurologic:  Neurovascularly intact without any focal sensory/motor deficits.  Cranial nerves: II-XII intact, grossly non-focal.  Psychiatric:  Alert and oriented, thought content appropriate, normal insight  Capillary Refill: Decreased in lower extremities  Peripheral Pulses: +2 palpable, equal bilaterally upper mural plaque in the distal abdominal aorta without flow limiting stenosis or aneurysm. There is mild stenosis at the takeoff of the celiac trunk. The superior mesenteric artery is patent without flow-limiting stenosis. There is moderate stenosis at the takeoff of the inferior mesenteric artery. There is mild stenosis in the proximal renal arteries bilaterally. There is calcified and noncalcified mural plaque in the common iliac vessels without flow-limiting stenosis on the right and moderate stenosis on the left. There is moderate stenosis in the right external iliac artery and mild stenosis on the left. There is moderate stenosis in the bilateral common femoral arteries. There  is complete occlusion of the external femoral or artery on the right at its proximal segment there is partial reconstitution distally from collaterals likely from the patent profunda femoris artery. There is also complete occlusion of the left superficial femoral artery shortly after the takeoff. There is minimal reconstitution of flow at the left popliteal artery from collaterals. As on the right side, the profunda femoral artery is patent. There is diminutive flow in the bilateral popliteal arteries. There is flow in the anterior and posterior tibial arteries on the right as well as the peroneal artery though diminutive. This continues into the ankle and foot. On the left side, there is diminutive flow in the anterior and posterior tibial arteries which also continues in the foot and ankle. There is barely detectable flow in the proximal peroneal artery on the left which tapers to no visual flow proximal to the ankle. There is a 5 mm noncalcified nodule in the posterior lateral aspect of the right lower lobe. There is a calcified nodule more inferiorly in the right lower lobe as evidence for old granulomatous disease. Visualized portions of lungs are otherwise clear. Visualized portion of the heart is unremarkable.  There is a calcified granuloma in the left lobe of the liver. Liver is diffusely hypodense without other focal lesion identified. The gallbladder is unremarkable. Adrenal glands are normal in appearance. There is mild nonspecific perinephric fat stranding. Kidneys are otherwise unremarkable. There are calcified granulomas in the spleen which is otherwise unremarkable. . The pancreas is normal in appearance. No retroperitoneal or mesenteric lymphadenopathy is identified. Large bowel appears within normal in its. The appendix is normal in appearance. Small bowel is normal in appearance. The unopacified bladder is unremarkable. The prostate is enlarged with central calcifications. No free fluid is identified. No suspicious osseous lesions are present. 1. 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 2. Diminutive flow in the tibial and peroneal arteries bilaterally with attenuated 3 vessel runoff on the right and 2 vessel runoff on the left. 3. Moderate areas of stenosis in the iliac and common femoral arteries. 4. 5 mm noncalcified nodule in the right lower lobe. Consider follow-up CT in one year per Fleischner criteria. Findings were discussed with Kamla Soares RN via telephone at the time of interpretation. **This report has been created using voice recognition software. It may contain minor errors which are inherent in voice recognition technology. ** Final report electronically signed by Dr. Ana Betancourt MD on 11/3/2021 8:44 AM      Electronically signed by Boom Salazar DO on 11/6/2021 at 8:03 AM

## 2021-11-06 NOTE — PROGRESS NOTES
[place] [facility]      Patient - Ariadna Carroll   MRN -  [id number]   [facility] - [[facility]   - 1955      Date of Admission -  2021  3:38 PM  Date of evaluation -  2021  Room - [facility], DO Primary Care Physician - TANIA Cerda CNP     Problem List      Active Hospital Problems    Diagnosis Date Noted    Peripheral artery disease [facility]) [I73.9]      Priority: High    Smoker [F17.200] 2013     Priority: Medium    Hyperglycemia [R73.9] 2013     Priority: Medium    Ischemia of right lower extremity [I99.8] 2021    Chronic diastolic congestive heart failure ([facility] [address] [I50.32] 2018    Essential hypertension [I10] 2017    Cardiomyopathy ([facility] [address] [I42.9] 2016     Reason for Consult    Preop pulmonary evaluation for scheduled procedure  HPI   History Obtained From: Patient and electronic medical record. Ariadna Carroll is a 61 y.o. male with a past medical history of current tobacco smoking for 40 years with 1 pack of cigarettes per day. He still smoking 1 pack of cigarettes per day. His usual functional status is half a block on level ground. Patient initially presented to the emergency room with a 4-day history of right foot pain and color change. Patient found to have ischemic changes of the distal right foot with a weak dorsalis pedis pulses. He is having shortness of breath: No  Diurnal variation:  None. Current functional capacity on level ground: 0.5 block  on level ground. He can climb steps: Yes  Flights of steps she can climb: Half a flight of stairs  He denies orthopnea. He denies paroxysmal nocturnal dyspnea. Reporting a fall downstairs okay.   No problem  He is having cough: No     He is having chest pain:NoIntake in the [place] [employer] in our phone      Past 24 hrs   -Going home  -On RA, did not qualified for supplemental oxygen, 6 MWT reviewed  -Will have OP vascular evaluation   All other systems reviewed    PMHx   Past Medical History      Diagnosis Date    CAD (coronary artery disease)     Chronic diastolic congestive heart failure (HCC) 3/19/2018    Hyperlipidemia     Hypertension     MI (myocardial infarction) ([facility] [address] 03/21/2013    Tobacco abuse       Past Surgical History        Procedure Laterality Date    CARDIAC [facility]  3/13    [facility]    CORONARY ANGIOPLASTY WITH STENT PLACEMENT  10-1-13     Meds    Current Medications    glipiZIDE  10 mg Oral QAM AC    predniSONE  40 mg Oral Daily    ipratropium-albuterol  1 ampule Inhalation BID    clotrimazole   Topical BID    lisinopril  5 mg Oral BID    carvedilol  12.5 mg Oral BID WC    nicotine  1 patch TransDERmal Daily    rivaroxaban  2.5 mg Oral BID    aspirin  81 mg Oral Daily    atorvastatin  20 mg Oral Daily    clopidogrel  75 mg Oral Daily    sodium chloride flush  5-40 mL IntraVENous 2 times per day     glucose, dextrose, glucagon (rDNA), dextrose, ipratropium-albuterol, HYDROcodone 5 mg - acetaminophen, heparin (porcine), heparin (porcine), sodium chloride flush, sodium chloride, ondansetron **OR** ondansetron, polyethylene glycol, acetaminophen **OR** acetaminophen, potassium chloride **OR** potassium alternative oral replacement **OR** potassium chloride, magnesium sulfate, morphine **OR** morphine  IV Drips/Infusions   dextrose      sodium chloride       Home Medications  Medications Prior to Admission: [DISCONTINUED] atorvastatin (LIPITOR) 20 MG tablet, TAKE ONE TABLET BY MOUTH ONCE DAILY  [DISCONTINUED] carvedilol (COREG) 25 MG tablet, TAKE ONE TABLET BY MOUTH TWICE DAILY WITH MEALS  [DISCONTINUED] clopidogrel (PLAVIX) 75 MG tablet, TAKE ONE TABLET BY MOUTH ONCE DAILY  [DISCONTINUED] lisinopril (PRINIVIL;ZESTRIL) 10 MG tablet, TAKE 1 TABLET BY MOUTH TWICE DAILY  [DISCONTINUED] aspirin 81 MG tablet, Take 1 tablet by mouth daily.  With food  Indications: Cardiovascular Risk Reduction, blood thinner  Diet    ADULT DIET; Regular  Allergies    Patient has no known allergies. Social History     Social History     Tobacco Use    Smoking status: Current Every Day Smoker     Packs/day: 1.50     Years: 40.00     Pack years: 60.00     Types: Cigarettes    Smokeless tobacco: Never Used   Vaping Use    Vaping Use: Never used   Substance Use Topics    Alcohol use: Yes     Comment: 12 pk per day/ has cut down     Drug use: No     Family History          Problem Relation Age of Onset    Diabetes Mother     Stroke Mother     Heart Disease Father     High Blood Pressure Brother      Sleep History    Never diagnosed with sleep apnea in the past.  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  He is currently on disability                       History of tobacco smoking:Yes  Amount of tobacco smokin.0 PPD. Years of tobacco smokin. Quit smoking: No.               Current smoker: Yes. Amount of current tobacco smokinPPD  . History of recreational or IV drug use in the past:NO     History of exposure to coal mines/coal dust: NO  History of exposure to foundry dust/welding: NO  History of exposure to quarry/silica/sandblasting: NO  History of exposure to asbestos/working with breaks/ships: NO  History of exposure to farm dust: NO  History of recent travel to long distances: NO  History of exposure to birds, pigeons, or chickens in the past:NO  Pet animals at home:No    History of pulmonary embolism in the past: No            History of DVT in the past:No      .          Vitals     height is 5' 8\" (1.727 m) and weight is 211 lb 10.3 oz (96 kg). His oral temperature is 97.6 °F (36.4 °C). His blood pressure is 127/70 and his pulse is 72. His respiration is 18 and oxygen saturation is 98%. Body mass index is 32.18 kg/m².       I/O        Intake/Output Summary (Last 24 hours) at 11/6/2021 1143  Last data filed at 11/6/2021 0414  Gross per 24 hour   Intake 130 ml   Output 0 ml   Net 130 ml     I/O last 3 completed shifts: In: 130 [P.O.:120; I.V.:10]  Out: 0    Patient Vitals for the past 96 hrs (Last 3 readings):   Weight   11/06/21 0400 211 lb 10.3 oz (96 kg)   11/05/21 0400 223 lb 8.7 oz (101.4 kg)   11/02/21 2144 228 lb 14.4 oz (103.8 kg)       Exam   Physical Exam   Constitutional: No distress on O2 per NC. Patient appears obese BMI 34  Head: Normocephalic and atraumatic. Mouth/Throat: Oropharynx is clear and moist.  No oral thrush. Eyes: Conjunctivae are normal. Pupils are equal, round. No scleral icterus. Neck: Neck supple. No tracheal deviation present. Cardiovascular: S1 and S2 with no murmur. No peripheral edema  Pulmonary/Chest: Normal effort with bilateral air entry, clear breath sounds. No stridor. No respiratory distress. Patient exhibits no tenderness. Abdominal: Soft. Bowel sounds audible. No distension or tenderness to palp. Musculoskeletal: Moves all extremities  Neurological: Patient is alert and oriented to person, place, and time. Skin: Noted blue hue to right foot    Labs  - Old records and notes have been reviewed in [facility]   ABG  Lab Results   Component Value Date    PH 7.41 11/05/2021    PO2 56 11/05/2021    PCO2 43 11/05/2021    HCO3 27 11/05/2021    O2SAT 89 11/05/2021     No results found for: IFIO2, MODE, SETTIDVOL, SETPEEP  CBC  Recent Labs     11/04/21  0616 11/06/21  0409   WBC 10.9* 17.6*   RBC 3.89* 4.17*   HGB 13.2* 13.9*   HCT 38.2* 40.5*   MCV 98.2* 97.1*   MCH 33.9* 33.3*   MCHC 34.6 34.3    241   MPV 10.7 11.1      BMP  No results for input(s): NA, K, CL, CO2, BUN, CREATININE, GLUCOSE, MG, PHOS, CALCIUM, IONCA, MG in the last 72 hours.   LFT  Recent Labs     11/05/21  0948   AST 19   ALT 36   BILITOT 0.7   ALKPHOS 86     TROP  No results found for: TROPONINT  BNP  No results for input(s): BNP in the last 72 hours. Lactic Acid  No results for input(s): LACTA in the last 72 hours. INR  No results for input(s): INR, PROTIME in the last 72 hours. PTT  No results for input(s): APTT in the last 72 hours. Glucose  Recent Labs     21  0755   POCGLU 202*     UA No results for input(s): SPECGRAV, PHUR, COLORU, CLARITYU, MUCUS, PROTEINU, BLOODU, RBCUA, WBCUA, BACTERIA, NITRU, GLUCOSEU, BILIRUBINUR, UROBILINOGEN, KETUA, LABCAST, LABCASTTY, AMORPHOS in the last 72 hours. Invalid input(s): CRYSTALS. ABG 2021  Results for Lizbet Titus (MRN [id number]) as of 2021 10:55   Ref. Range 2021 00:14   Source: Unknown R Brach   pH, Blood Gas Latest Ref Range: 7.35 - 7.45  7.41   PCO2 Latest Ref Range: 35 - 45 mmhg 43   pO2 Latest Ref Range: 71 - 104 mmhg 56 (L)   HCO3 Latest Ref Range: 23 - 28 mmol/l 27   Base Excess (Calculated) Latest Ref Range: -2.5 - 2.5 mmol/l 2.4   O2 Sat Latest Units: % 89   Omar Test Unknown N/A       Bedside [place]       Sleep studies   PATIENT NAME: Nima Valdivia.           :  1955  MEDICAL RECORD NO. [id number]               ROOM:   ACCOUNT NO: [de-identified]                        DATE: 2013  PHYSICIAN: Noelle Bolden M.D. DIAGNOSTIC POLYSOMNOGRAM     REFERRING PHYSICIAN:  Dr. Sim Parsi:  Snoring, excessive daytime somnolence, coronary artery disease. HISTORY OF PRESENT ILLNESS:  Mr. Priscila Parsons is a 51-year-old gentleman,  patient of Dr. Jeremiah Jain, with recent acute coronary syndrome with ischemic  cardiomyopathy, referred to the Sleep Lab to rule out obstructive sleep  apnea, for complaints of excessive daytime somnolence and snoring. Sleepiness scale score is 12. Weight is listed as 204 pounds. Height 68 inches. BMI 31.      METHODS:  The patient underwent digital polysomnography in compliance with  the standards and specifications from the AASM Manual including the  simultaneous recording of 3 EEG channels (F4-M1, C4-M1, and O2-M1 with back  up electrodes F3-M2, C3-M2, and O1-M2), 2 EOG channels (E1-M 2, and E2-M1),  EMG (chin, left and right leg), EKG, Nonin pulse oximetry with  less than 2  second averaging time, body position, airflow recorded by oral-nasal thermal  sensor and nasal air pressure transducer, plus respiratory effort recorded by  calibrated respiratory inductance plethmography (RIP), flow volume loop,  sound and video. Sleep staging and scoring followed the standard put forth  by the American Academy of Sleep Medicine and utilized the 4A obstructive  hypopnea event desaturation of 4 percent or greater. DETAILS OF THE STUDY:  Lights out at 2252 hours and lights on at 0525 hours. Time in bed 394 minutes. Total sleep time 291 minutes. Sleep efficiency 74  percent. Sleep onset delay at 57 minutes. Wake after sleep onset 45  minutes. Latency to REM zero, as there was no REM sleep recorded. SLEEP DISRUPTION EVENTS:  There were 142 arousals for an arousal index of 29. SLEEP STAGE STATISTICS:  Mr. Lesly Parkinson slept 93 minutes in N1 sleep or 32  percent of total sleep time, 183 minutes in N2 sleep or 63 percent of total  sleep time, 15 minutes in N3 sleep or 5 percent of total sleep time. There  was no REM sleep. EKG SUMMARY:  Mr. Lesly Parkinson remained in normal sinus rhythm throughout the  night, with an average heart rate of 73.5 beats per minute during wakefulness  and 69 beats per minute during sleep. There were no sustained arrhythmias or  AV blocks. LIMB MOVEMENT SUMMARY:  There were a total of 90 episodes of limb movements,  65 of them were isolated and 25 were PLMs, for a limb movement index of 18.6,  with a limb movement arousal index of 3.7. RESPIRATORY SUMMARY:  There was some loud snoring recorded. There were zero  apneas, 3 hypopneas, for an AHI of 0.6. There were zero obstructive apneas,  zero central apneas and zero mixed apneas. The respiratory disturbance index  was normal at 3.9.   Pulse oximetry recorded revealed a mean oxygen saturation  during wakefulness of 96 percent and 91 percent during sleep. The lowest  oxygen saturation recorded throughout this study is 88 percent. He did not  drop oxygen saturation below 88 percent. There were 225.5 minutes of supine  sleep recorded. IMPRESSION:  Negative study for sleep disordered breathing. Difficulty  initiating and maintaining sleep with disruptive sleep architecture, with no  REM sleep recorded, which could be conceivably due to first night effect. Mild increase in number of limb movements, with a normal limb movement  arousal index. RECOMMENDATIONS:  PAP therapies are not indicated or recommended. Consider  other reasons of somnolence, review medications and sleep hygiene. I have reviewed the raw data of the above sleep study epoch by epoch and  interpreted. Anne Marie Mcdaniel M.D.        D: 09/03/2013 17:51                                   T: 09/04/2013 10:43  als     CC:  Anne Marie Mcdaniel M.D. Harvey Rodriguez M.D.  [address]                  [address]  [address]                        [address]  [facility], [address] [place], [address]      Cultures    COVID-19 test ( SARS-CoV-2) is Negative/None detected on: 11/2/21      EKG     Echocardiogram   Right Ventricle   The right ventricular size was normal with normal systolic function and   wall thickness. Conclusions      Summary   Technically difficult examination. Normal left ventricle size and systolic function. Ejection fraction was   estimated at 60 %. There were no regional left ventricular wall motion   abnormalities and wall thickness was within normal limits. Doppler parameters were consistent with abnormal left ventricular   relaxation (grade 1 diastolic dysfunction).       Signature      ----------------------------------------------------------------   Electronically signed by Christelle Smiley MD (Interpreting   physician) on 11/12/2018 at 05:25 PM      Radiology    CXR  Thu Nov 4, 2021    PROCEDURE: XR CHEST PORTABLE   No acute intrathoracic process. Assessment   - COPD -based on bedside spirometry  -Current daily smoker  -PAD  Plan   -Stable for DC  -Anoro 1 inh daily  -Strongly advised to stop smoking  -At time of discharge schedule patient for follow-[facility] at [facility]. [facility] in 3 months with Full PFT approximately 5 days prior to appointment with Leonardo Miller CNP or Dr. Sofia Monroe MD  -        - Edilma Smith educated about my impression and plan. He verbalizes understanding. Questions and concerns addressed.     Electronically signed by   Yoselin Alexander MD on 11/6/2021 at 11:43 AM

## 2021-11-08 ENCOUNTER — CARE COORDINATION (OUTPATIENT)
Dept: CASE MANAGEMENT | Age: 66
End: 2021-11-08

## 2021-11-08 NOTE — CARE COORDINATION
[facility] Transitions Initial Follow Up Call    Call within 2 business days of discharge: Yes    Patient: Santosh Gomez Patient : 1955   MRN: [id number]  Reason for Admission: Ischemia of RLE  Discharge Date: 21 RARS: Readmission Risk Score: 9.1 ( )      Last Discharge [address]       Complaint Diagnosis Description Type Department Provider    21 Foot Pain; Leg Swelling Peripheral artery disease ([facility] [address] . .. ED to Hosp-Admission (Discharged) (ADMITTED) KADEN 4K Alexander Finch DO; Masha Greer. .. Spoke with: Phyllis Posey, patient    Contacted patient for initial care transition follow up. Chayito Villegas states that he is doing \"about the same\". Reports he continues to have right foot pain and discoloration. No changes since returning home. Reports swelling in foot is \"a little better\". Reports he is taking OTC Acetaminophen as needed for the pain in his foot. Denies having any c/o chest pain/discomfort, pressure, tightness, shortness of breath, fever, chills. Reports he is not weighing himself daily d/t not having a scale. Discussed importance of monitoring weight and when to contact his provider. Reviewed medication list.  Patient did not get Nicoderm CQ. States he usually smokes one pack a day but has only smoked 3 cigarettes since returning home. iTn informed CTN that he does not have the Glipizide. Requested to have Rx transferred from [facility] to [facility] on [address]. He confirmed he has the Carvedilol and Lisinopril. CTN will make a referral to pharmacy. He is in agreement with referral.  He has an appointment with the vascular surgeon on 11/15/21. He plans to contact the other offices to schedule his appointments. Declined assistance from CTN. Discussed when to contact his provider with any new or worsening symptoms. He verbalized understanding. No other questions or needs at this time. CTN contacted [facility]. Spoke with Magaly Escalera, pharmacist.  Informed her that patient would like to have Glipizide 10 mg transferred from [facility] on [address] to [facility]. She confirmed she will contact [facility] to have medication transferred. Transitions of Care Initial Call    Was this an external facility discharge? No Discharge Facility: [facility] to be reviewed by the provider   Additional needs identified to be addressed with provider: No  none             Method of communication with provider : none      Advance Care Planning:   Does patient have an Advance Directive: Not on file. Was this a readmission? No  Patient stated reason for admission: Foot pain  Patients top risk factors for readmission: lack of knowledge about disease, medical condition-Alcohol abuse, CHF, HTN, Depression, NSTEMI, Heart disease, medication management, polypharmacy and support system    Care Transition Nurse (CTN) contacted the patient by telephone to perform post hospital discharge assessment. Verified name and  with patient as identifiers. Provided introduction to self, and explanation of the CTN role. CTN reviewed discharge instructions, medical action plan and red flags with patient who verbalized understanding. Patient given an opportunity to ask questions and does not have any further questions or concerns at this time. Were discharge instructions available to patient? Yes. Reviewed appropriate site of care based on symptoms and resources available to patient including: PCP and Specialist. The patient agrees to contact the PCP office for questions related to their healthcare. Medication reconciliation was performed with patient, who verbalizes understanding of administration of home medications. Reviewed and educated patient on any new and changed medications related to discharge diagnosis. Was patient discharged with a pulse oximeter?  No Discussed and confirmed pulse oximeter discharge instructions and when to notify provider or seek emergency care. CTN provided contact information. Plan for follow-up call in 3-5 days based on severity of symptoms and risk factors.   Plan for next call: symptom management-any new or worsening signs/symptoms and follow up appointment-follow up with PCP        Care Transitions 24 Hour Call    Do you have any ongoing symptoms?: Yes  Patient-reported symptoms: Pain (Comment: pain in right foot, discoloration in right foot)  Do you have a copy of your discharge instructions?: Yes  Do you have all of your prescriptions and are they filled?: No  Have you been contacted by a [id number] Pharmacist?: No  Have you scheduled your follow up appointment?: Yes (Comment: Vascular surgeon 11/15/2021)  How are you going to get to your appointment?: Other (Comment: Uses [facility])  Were you discharged with any Home Care or Post Acute Services: No  Do you feel like you have everything you need to keep you well at home?: Yes  Care Transitions Interventions         Follow Up  Future Appointments   Date Time Provider William Wang   11/15/2021  3:30 PM MD Siddhartha Dyson CT/CV [facility] - Earl Cai RN

## 2021-11-09 ENCOUNTER — TELEPHONE (OUTPATIENT)
Dept: PHARMACY | Facility: CLINIC | Age: 66
End: 2021-11-09

## 2021-11-09 NOTE — TELEPHONE ENCOUNTER
Jayesh Dominguez RN  [facility] 19 hours ago (12:58 PM)     WS    Patient currently on multiple medications.  Would like to review medications with pharmacist. Contreras greene.

## 2021-11-10 ENCOUNTER — SCHEDULED TELEPHONE ENCOUNTER (OUTPATIENT)
Dept: PHARMACY | Facility: CLINIC | Age: 66
End: 2021-11-10

## 2021-11-10 NOTE — TELEPHONE ENCOUNTER
11/04/2021    LABA1C 5.9 04/01/2013     Lab Results   Component Value Date    CHOL 140 11/05/2021    TRIG 79 11/05/2021    HDL 44 11/05/2021    LDLCHOLESTEROL 94 06/07/2019    LDLCALC 80 11/05/2021     ALT   Date Value Ref Range Status   11/05/2021 36 11 - 66 U/L Final     AST   Date Value Ref Range Status   11/05/2021 19 5 - 40 U/L Final     The ASCVD Risk score (Jo Ann Ramos, et al., 2013) failed to calculate for the following reasons: The patient has a prior MI or stroke diagnosis     Lab Results   Component Value Date    CREATININE 0.6 11/03/2021       Estimated Creatinine Clearance: 140 mL/min (based on SCr of 0.6 mg/dL). Social History:   Social History     Tobacco Use    Smoking status: Current Every Day Smoker     Packs/day: 1.50     Years: 40.00     Pack years: 60.00     Types: Cigarettes    Smokeless tobacco: Never Used   Substance Use Topics    Alcohol use: Yes     Comment: 12 pk per day/ has cut down        Immunizations:   Most Recent Immunizations   Administered Date(s) Administered    Pneumococcal Polysaccharide ([id number]) 04/02/2013       ASSESSMENT/PLAN:   - General Assessment:   · Adherence: Adherence is a concern with this patient. I believe that going forward, he will be adherent to his medications. He admits to getting \"lazy\" and not following up with his providers and stopping medications on his own . He says he knows that's what got him where he is now  · Cost: Not a concern. Dual eligible medicare/medicaid  · Current pharmacy/pharmacies: Does not have a specific pharmacy. He says he lives near [facility], but could also use [facility] or [facility]. Reminded that he will need to get his meds refilled at [facility] or request transfers. · Drug interactions: No clinically significant interactions identified via Lexicomp Interaction Analysis as category D or higher. · Renal dosing: No renal adjustments necessary. · Medication monitoring: Patient is on DAPT and Xarelto.   Discussed bleeding risk and how to identify bleeding that would warrant an ED visit. · Smoking status: Discussed. States that he has drastically cut back since coming home. He reports smoking 3 cigarettes per day. Was has high as 1ppd. Aware that this is an issue, but not interested in NRT. Made aware of different support group options if he changes his mind  · Medication Issues:  · Carvedilol- Patient was initially prescribed Carvedilol 25mg BID and received from [id number]. After receiving this, a cardiologist reduced his dose 12.5mg BID, and a new prescription was sent to [facility]. He had been taking the old dose because it does not seem it was communicated to him. Says he will start the lower dose. Verbalized understanding. · Lisinopril- Patient was initially prescribed Lisinopril 10mg BID and received from [address]. After receiving this, a cardiologist reduced his dose to 5mg BID, and a new prescription was sent to [facility]. He was aware of this and was taking correctly    - COPD:     Inhaler technique: Verbalized correct technique and instructions for both inhalers.  Reports using Anoro once daily, and only needing albuterol BID at most   Pneumonia vaccine: Up to date   Smoking status: see above- still smoking    - Diabetes:    Glycemic goal: <7.0%. Is not at blood glucose goal because he is newly diagnosed. Patient reports that he cannot remember ever having his BG taken. He has never been on any medications that he can remember.  Home blood sugar records: patient does not test. Does not have supplies. Patient will likely need to get established with pcp before his insurance will pay for supplies.  Any episodes of hypoglycemia: no.  Discussed how glipizide can cause lows. Encouraged patient to eat with this medication   Discussed some of the short and long term complications of elevated BG. He is at a very high risk for DM foot complications due to his PAD.   He said he has seen people lose their legs from  PAD and/or diabetes. - Upcoming appointments:   Future Appointments   Date Time Provider William Wang   11/15/2021  3:30 PM Joy Later, MD MARCK BREWSTER CT/CV [facility] - [address]  Alex Mcgowan, PharmD, [address] [facility]  Department, toll free: [phone]    For Pharmacy Admin Tracking Only     CPA in place:  No   Recommendation Provided To: Patient/Caregiver: 2 via Telephone   Intervention Detail: SHERINE: Level 3 #: 2   Gap Closed?: Yes    Intervention Accepted By: Patient/Caregiver: 2   Time Spent (min): 45

## 2021-11-12 ENCOUNTER — CARE COORDINATION (OUTPATIENT)
Dept: CASE MANAGEMENT | Age: 66
End: 2021-11-12

## 2021-11-12 NOTE — CARE COORDINATION
[facility]in [facility] Transitions Follow Up Call    2021    Patient: Akua Harvey  Patient : 1955   MRN: [id number]  Reason for Admission:   Discharge Date: 21 RARS: Readmission Risk Score: 9.1 ( )         Spoke with: Aj Rosales, patient    Contacted patient for care transition follow up. Cristobal Mckeon states he is doing \"about the same\". States he continues to have pain/throbbing in toe. He continues to take OTC Acetaminophen as needed. He states he has an appointment with the surgeon on Monday, 11/15/21. Reports that he had a \"bloody nose\" last night. States that the bleeding stopped quickly. Discussed being on Plavix, Xarelto and Aspirin and bleeding precautions. Discussed when to call 911 and/or report to the ER. He verbalized understanding. He denies having any shortness of breath, fever, chills. He requested assistance with scheduling his cardiology, pulmonary and diabetes appointment. CTN will send a message to Maycol Eagle,  for assistance. He is aware that someone will contact him regarding these appointments. He does not have any questions or concerns at this time. CTN sent staff message to .      Care Transitions Follow Up Call    Needs to be reviewed by the provider   Additional needs identified to be addressed with provider: No  none             Method of communication with provider : none      Care Transition Nurse (CTN) contacted the patient by telephone to follow up after admission on 21-21. Verified name and  with patient as identifiers. Addressed changes since last contact: none  Discussed follow-up appointments. If no appointment was previously scheduled, appointment scheduling offered: Yes. Is follow up appointment scheduled within 7 days of discharge? Yes.     Advance Care Planning:   Does patient have an Advance Directive: Not on file     CTN reviewed discharge instructions, medical action plan and red flags with patient and discussed any barriers to care and/or understanding of plan of care after discharge. Discussed appropriate site of care based on symptoms and resources available to patient including: PCP and Specialist. The patient agrees to contact the PCP office for questions related to their healthcare. Patients top risk factors for readmission: lack of knowledge about disease, medical condition-Alcohol abuse, CHF, HTN, Depression, NSTEMI, Heart disease, medication management, polypharmacy and support system  Interventions to address risk factors: Education of patient/family/caregiver/guardian to support self-management-signs/symptoms and when to contact provider    CTN provided contact information for future needs. Plan for follow-up call in 5-7 days based on severity of symptoms and risk factors. Plan for next call: symptom management-any new or worsening symptoms. Care Transitions Subsequent and Final Call    Subsequent and Final Calls  Do you have any ongoing symptoms?: Yes  Patient-reported symptoms: Pain  Do you have any questions related to your medications?: No  Do you currently have any active services?: No  Do you have any needs or concerns that I can assist you with?: Yes  Patient-reported Needs or Concerns: Request to schedule all appts  Identified Barriers: Lack of Support, Lack of Education  Care Transitions Interventions  Other Interventions:            Follow Up  Future Appointments   Date Time Provider William Wang   11/15/2021  3:30 PM Joy Later, MD Jimmye Boeck CT/CV [facility] - Prema Torres RN

## 2021-11-15 ENCOUNTER — CARE COORDINATION (OUTPATIENT)
Dept: CASE MANAGEMENT | Age: 66
End: 2021-11-15

## 2021-11-15 ENCOUNTER — TELEPHONE (OUTPATIENT)
Dept: CARDIOLOGY CLINIC | Age: 66
End: 2021-11-15

## 2021-11-15 ENCOUNTER — OFFICE VISIT (OUTPATIENT)
Dept: CARDIOTHORACIC SURGERY | Age: 66
End: 2021-11-15
Payer: MEDICARE

## 2021-11-15 VITALS
HEIGHT: 68 IN | SYSTOLIC BLOOD PRESSURE: 120 MMHG | DIASTOLIC BLOOD PRESSURE: 78 MMHG | BODY MASS INDEX: 34.37 KG/M2 | WEIGHT: 226.8 LBS | HEART RATE: 60 BPM

## 2021-11-15 DIAGNOSIS — I70.209 FEMORAL ARTERY OCCLUSION (HCC): Primary | ICD-10-CM

## 2021-11-15 DIAGNOSIS — Z01.818 ENCOUNTER FOR OTHER PREPROCEDURAL EXAMINATION: ICD-10-CM

## 2021-11-15 DIAGNOSIS — I73.9 PAD (PERIPHERAL ARTERY DISEASE) (HCC): Primary | ICD-10-CM

## 2021-11-15 PROCEDURE — G8427 DOCREV CUR MEDS BY ELIG CLIN: HCPCS | Performed by: THORACIC SURGERY (CARDIOTHORACIC VASCULAR SURGERY)

## 2021-11-15 PROCEDURE — 3017F COLORECTAL CA SCREEN DOC REV: CPT | Performed by: THORACIC SURGERY (CARDIOTHORACIC VASCULAR SURGERY)

## 2021-11-15 PROCEDURE — G8417 CALC BMI ABV UP PARAM F/U: HCPCS | Performed by: THORACIC SURGERY (CARDIOTHORACIC VASCULAR SURGERY)

## 2021-11-15 PROCEDURE — 4004F PT TOBACCO SCREEN RCVD TLK: CPT | Performed by: THORACIC SURGERY (CARDIOTHORACIC VASCULAR SURGERY)

## 2021-11-15 PROCEDURE — 99214 OFFICE O/P EST MOD 30 MIN: CPT | Performed by: THORACIC SURGERY (CARDIOTHORACIC VASCULAR SURGERY)

## 2021-11-15 PROCEDURE — G8484 FLU IMMUNIZE NO ADMIN: HCPCS | Performed by: THORACIC SURGERY (CARDIOTHORACIC VASCULAR SURGERY)

## 2021-11-15 PROCEDURE — 1111F DSCHRG MED/CURRENT MED MERGE: CPT | Performed by: THORACIC SURGERY (CARDIOTHORACIC VASCULAR SURGERY)

## 2021-11-15 RX ORDER — CILOSTAZOL 50 MG/1
50 TABLET ORAL DAILY
Qty: 3 TABLET | Refills: 0 | Status: SHIPPED | OUTPATIENT
Start: 2021-11-15 | End: 2022-08-26

## 2021-11-15 RX ORDER — CILOSTAZOL 50 MG/1
50 TABLET ORAL 2 TIMES DAILY
Qty: 8 TABLET | Refills: 0 | Status: SHIPPED | OUTPATIENT
Start: 2021-11-15 | End: 2022-08-26

## 2021-11-15 RX ORDER — CILOSTAZOL 100 MG/1
100 TABLET ORAL 2 TIMES DAILY
Qty: 60 TABLET | Refills: 3 | Status: SHIPPED | OUTPATIENT
Start: 2021-11-15 | End: 2022-05-05

## 2021-11-15 NOTE — CARE COORDINATION
Request from [address] [place] [address] NAREN Steve. Please schedule patient for below appts. Cardiology-  Patient will receive call from office for hospital f/u appt. Call made cardio office spoke with patient and are arranging transportation. Diabetic education- needs referral, called and left msg on nurse line at [facility] office requesting referral.-   Spoke with Jackelyn Severance to see if she can do education with patient. Will send chart to her. Dr. Alanis Guidry, pulmonology - they will call patient with time and date of appt. CTN if patient does not have these appts scheduled upon next outreach, please send back to this writer.     Ariel Campo, [address]  Care Coordination Transition

## 2021-11-15 NOTE — TELEPHONE ENCOUNTER
Ben Higgins needs a hospital f/u in 2 wks with jamir bowie, seen him in the hospital, DC to home 11-06, DX=Peripheral artery disease, GS= none available. Please call Ben Higgins to get an appointment scheduled.

## 2021-11-15 NOTE — TELEPHONE ENCOUNTER
Patient was needing a ride from [address] [facility]. [facility] was called and was scheduled for a pickup time of 12:30 on 11/30/2021. Patient was called but phone rang busy.

## 2021-11-15 NOTE — TELEPHONE ENCOUNTER
Called and talked to patient. Appt scheduled with Marla. He states he uses [facility] for transportation. I talked to Torsten Howell at the  and she will call [facility] for transportation for appt with Marla.

## 2021-11-15 NOTE — PROGRESS NOTES
CT/CV Surgery Follow Up Office Visit      Patient's Name/Date of Birth: Margaret Faulkner / 1955 (54 y.o.)    PCP: TANIA Mejia CNP    Date: November 15, 2021    We had the pleasure of seeing Margaret Faulkner in the office today, as you know this is a very pleasant 61y.o. year old male with a history of hypertension, hyperlipidemia, coronary artery disease, status post myocardial infarction, status post coronary artery angioplasty in 2013, congestive heart failure, peripheral arterial disease, diabetes mellitus with a recent A1c 11.0, and tobacco abuse. He came to our cardiovascular surgery clinic for follow-up. He was seen on 11/02/2021 as a initial consultation for peripheral arterial disease when he was admitted in the hospital with bluish discoloration of the right third and fourth toes. Work-up including CTA at the time, showed a totally occluded bilateral superficial femoral artery. He improved and was discharged home with Xarelto and Plavix. He is here for follow-up after discharge from the hospital.    He reports 1 block claudication of the right leg for more than 6 months. He denied any rest pain except  very occasional episode(once in 3 months) of cramp in his right calf muscle. He denied any recent episode of nonhealing wound or ulcer in his right leg. PastMedical History:  Pavan Rivera  has a past medical history of CAD (coronary artery disease), Chronic diastolic congestive heart failure ([address] Hyperlipidemia, Hypertension, MI (myocardial infarction) ([facility] and Tobacco abuse. Uncontrolled diabetes mellitus    Past Surgical History:  The patient  has a past surgical history that includes Cardiac catheterization (3/13) and Coronary angioplasty with stent (10-1-13). Allergies: The patient has No Known Allergies.     Medications:    Current Outpatient Medications:     carvedilol (COREG) 12.5 MG tablet, Take 1 tablet by mouth 2 times daily (with meals), Disp: 60 tablet, Rfl: 3   glipiZIDE (GLUCOTROL) 10 MG tablet, Take 1 tablet by mouth every morning (before breakfast), Disp: 60 tablet, Rfl: 3    lisinopril (PRINIVIL;ZESTRIL) 5 MG tablet, Take 1 tablet by mouth 2 times daily, Disp: 30 tablet, Rfl: 3    umeclidinium-vilanterol (ANORO ELLIPTA) 62.5-25 MCG/INH AEPB inhaler, Inhale 1 puff into the lungs daily, Disp: 1 each, Rfl: 11    albuterol sulfate HFA (VENTOLIN HFA) 108 (90 Base) MCG/ACT inhaler, Inhale 2 puffs into the lungs every 6 hours as needed for Wheezing or Shortness of Breath, Disp: 1 each, Rfl: 11    rivaroxaban (XARELTO) 2.5 MG TABS tablet, Take 1 tablet by mouth 2 times daily, Disp: 60 tablet, Rfl: 1    atorvastatin (LIPITOR) 20 MG tablet, Take 1 tablet by mouth daily, Disp: 30 tablet, Rfl: 1    clopidogrel (PLAVIX) 75 MG tablet, Take 1 tablet by mouth daily, Disp: 30 tablet, Rfl: 1    aspirin 81 MG chewable tablet, Take 1 tablet by mouth daily Indications: Cardiovascular Risk Reduction, blood thinner, Disp: 30 tablet, Rfl: 1    furosemide (LASIX) 40 MG tablet, Take 1 tablet by mouth daily, Disp: 30 tablet, Rfl: 1    cilostazol (PLETAL) 50 MG tablet, Take 1 tablet by mouth daily for 3 days, Disp: 3 tablet, Rfl: 0    cilostazol (PLETAL) 50 MG tablet, Take 1 tablet by mouth 2 times daily for 4 days, Disp: 8 tablet, Rfl: 0    cilostazol (PLETAL) 100 MG tablet, Take 1 tablet by mouth 2 times daily, Disp: 60 tablet, Rfl: 3    Family History: This patient's family history includes Diabetes in his mother; Heart Disease in his father; High Blood Pressure in his brother; Stroke in his mother. Social History:  Ileana Fatima  reports that he has been smoking cigarettes. He has a 60.00 pack-year smoking history. He has never used smokeless tobacco. He reports current alcohol use. He reports that he does not use drugs.     Vital Signs:   /78   Pulse 60   Ht 5' 8\" (1.727 m)   Wt 226 lb 12.8 oz (102.9 kg)   BMI 34.48 kg/m²     ROS:   Constitutional: Negative for activity change, chills, fatigue, fever and unexpected weight change. Respiratory: Chronic heavy smoking. 40 pack years  Cardiac: Status post coronary artery stenting. No ongoing midsternal chest pain. Vascular: Known occluded SFA bilaterally. Gastrointestinal: Negative for hematochezia, melana, constipation, and N/V/D. Musculoskeletal: Negative for myalgias, amputation. Skin: Negative for color change, rash and wound. Much improved bluish discoloration of her right third and fourth toe. Neurological: Negative for dizziness or syncope. Nephrology: Negative for chronic kidney disease,     Physical Exam:  General appearance:  No acute distress, appears stated age and cooperative. Neck: No jugular venous distention. Trachea midline. No carotid bruits. Chest Wall: No deformity, midsternal scar, enlarged palpable supraclavicular lymphnode. Respiratory:   Decreased breathing sound bilaterally. Cardiovascular:  Regular rate and rhythm with normal S1/S2 without murmurs, rubs or gallops. Abdomen: Soft, non-tender, non-distended with normal bowel sounds. Ext: Minimal discoloration of right third and fourth toe. Only discoloration on the plantar surface of the toes. Chronic ischemic changes of both feet. Skin:  Minimal bluish discoloration of right third and fourth toes. Neurologic:  Neurovascularly intact without any focal sensory/motor deficits. Peripheral Pulses: Normal femoral pulses bilaterally. No palpable pedal pulses bilaterally. Hand-held Doppler examination: Strong biphasic Doppler signal over left DP, PT, and right PT. Very faint systolic Doppler signal over right DP.     Labs:    CBC:  Lab Results   Component Value Date    WBC 17.6 11/06/2021    HGB 13.9 11/06/2021    HCT 40.5 11/06/2021    MCV 97.1 11/06/2021     11/06/2021    INR 1.06 04/01/2013     BMP:   Lab Results   Component Value Date     11/03/2021    K 4.1 11/03/2021    CL 98 11/03/2021    CO2 26 11/03/2021    BUN 10 11/03/2021    CREATININE 0.6 11/03/2021    MG 2.0 06/07/2019       Imaging: I have reviewed the CTA. Problem List:  Patient Active Problem List   Diagnosis    NSTEMI (non-ST elevated myocardial infarction) ([facility]    Hyperglycemia    Smoker    Alcohol abuse    SOB (shortness of breath)    Sleep apnea, obstructive    Fatigue    Daytime somnolence    Depression    Heart disease    Obesity (BMI 30.0-34. 9)    Presence of stent in LAD coronary artery stent    Cardiomyopathy ([facility]    Essential hypertension    Coronary artery disease involving native coronary artery of native heart without angina pectoris    Pure hypercholesterolemia    Chronic diastolic congestive heart failure (HCC)    SOB (shortness of breath) on exertion    Ischemia of right lower extremity    Peripheral artery disease (HCC)       Assessment: Known severe peripheral arterial disease including occlusion of bilateral SFA. Suspect severe PAD including right anterior tibial artery. 1 block claudication of right lower extremity. Currently on Xarelto and Plavix. Uncontrolled diabetes mellitus    Plan 11/15/21:  1) noninvasive arterial Doppler study and measurement of JOSE. 2) start cilostazol with a trial dose first and then regular dose. 3) follow-up in 4 weeks. 4) I had a long discussion with the patient about the importance of cessation of smoking. Thank you for allowing us to be involved in the patient's care.     Electronically by Chi Che MD  on 11/15/2021 at 4:34 PM

## 2021-11-16 ENCOUNTER — CARE COORDINATION (OUTPATIENT)
Dept: CASE MANAGEMENT | Age: 66
End: 2021-11-16

## 2021-11-16 NOTE — CARE COORDINATION
[facility]in [facility] Transitions Follow Up Call    2021    Patient: Eliane Reina  Patient : 1955   MRN: [id number]  Reason for Admission:   Discharge Date: 21 RARS: Readmission Risk Score: 9.1 ( )         Spoke with: Shaan Prabhakar, patient    Contacted patient for care transition follow up. Kameron Martin states that he is doing \"okay\". Reports he continues to have pain/throbbing in his toe. States Dr. Arin Gramajo assessed his legs/feet yesterday. He states that it has gotten [place] little\" better. He continues to take Acetaminophen as needed. He denies having any c/o chest pain/discomfort, fever, chills. Reports shortness of breath is at baseline. He informed CTN that he has appointments scheduled. Questioned his appointment with pulmonology on 21. CTN reviewed AVS and patient suppose to follow up in 3 months. CTN will contact office to verify this. CTN contacted Pulmonology office. Spoke with Mera Brewer. Informed her that someone scheduled patient for a follow up on 21. CTN wondering if okay because AVS states 3 months with PFT 5 days prior to appointment. She placed CTN on hold. Upon returning she informed CTN that someone had made a mistake and he should be seen in 3 months. Mera Brewer scheduled appointment for 22 at 1:20. She reminded CTN that patient will need the PFT 5 days prior to his appointment with Dr. Precious Novoa. CTN will let patient know. CTN called patient back. Informed him that his appointment on 21 was canceled and his new appointment is on 22 at 1:20. He verbalized understanding. He would like CTN to schedule his PFT appointment. CTN will send a message to .  He also informed CTN that he has an appointment with Catherine Chaney CNP on 21. Advised he request referral to [facility]. He verbalized understanding. Discussed signs/symptoms and when to contact provider with any new or worsening symptoms.   He verbalized understanding. He does not have any other questions or concerns at this time. Care Transitions Follow Up Call    Needs to be reviewed by the provider   Additional needs identified to be addressed with provider: No  none             Method of communication with provider : none      Care Transition Nurse (CTN) contacted the patient by telephone to follow up after admission on 21-21 . Verified name and  with patient as identifiers. Addressed changes since last contact: none  Discussed follow-up appointments. If no appointment was previously scheduled, appointment scheduling offered: Yes. Is follow up appointment scheduled within 7 days of discharge? No.    CTN reviewed discharge instructions, medical action plan and red flags with patient and discussed any barriers to care and/or understanding of plan of care after discharge. Discussed appropriate site of care based on symptoms and resources available to patient including: PCP and Specialist. The patient agrees to contact the PCP office for questions related to their healthcare. Patients top risk factors for readmission: lack of knowledge about disease, medical condition-Alcohol abuse, CHF, HTN, Depression, NSTEMI, heart disease and medication management  Interventions to address risk factors: Scheduled appointment with Specialist-21 (3 month follow up)      Non-[facility] follow up appointment(s): 21 with Barb Swain CNP    CTN provided contact information for future needs. Plan for follow-up call in 10-14 days based on severity of symptoms and risk factors. Plan for next call: symptom management-any new or worsening symptoms.       Care Transitions Subsequent and Final Call    Subsequent and Final Calls  Do you have any ongoing symptoms?: Yes  Patient-reported symptoms: Pain, Shortness of Breath  Have your medications changed?: Yes  Patient Reports: New Rx which was prescribed on 11/15/21  Do you have any questions related to your medications?: No  Do you currently have any active services?: No  Do you have any needs or concerns that I can assist you with?: Yes  Patient-reported Needs or Concerns: f/u on appt with pulmonology. Care Transitions Interventions  Other Interventions:            Follow Up  Future Appointments   Date Time Provider William Wang   11/30/2021  1:00 PM Marla Verduzco, APRN - CNP N [facility] - [facility] [place] [place]   12/6/2021  2:30 PM [facility] VASCULAR LAB ROOM 1 [facility] VAS LAB [facility] Radiolog   12/9/2021  1:00 PM Татьяна Francois, [address]   12/13/2021 10:00 AM MD Brandon Koch CT/CV [facility] - Vernon Rao RN

## 2021-11-16 NOTE — CARE COORDINATION
Patient was rescheduled by CTN. Still needed PFT,   Called scheduling have patient set up for PFT on 2/16/21  There are instructions and prep for this. .  Patient should be getting that in the mail or call before PFT.     Ariel Campo, [address]   Care Coordination Transition

## 2021-11-17 ENCOUNTER — CARE COORDINATION (OUTPATIENT)
Dept: CARE COORDINATION | Age: 66
End: 2021-11-17

## 2021-11-17 NOTE — CARE COORDINATION
Contacted Sebas Farr regarding Dietitian referral. Pt answered, RD explained reason for call and role in care. Pt is not interested in nutrition education at this time. Pt prefers to go to in person DM education classes. RD will inform Macy MACEDO. RD will follow up as appropriate.      [address] [facility], [address] [place] [address]

## 2021-11-22 NOTE — DISCHARGE SUMMARY
this a.m. would like to go home, await cardiothoracic input on discharge today.     11. 5.2021 patient off floor for stress test.  Cardiology and pulmonary consults noted will obtain home oxygen eval, will start on oral agent for dm, diet teaching     11. 6.2021 patient's stress test and Lexiscan's were negative for ischemia still waiting on home O2 eval, possibly discharge today. Increased WBC secondary to steroids         Exam:     Vitals:  Vitals:    11/06/21 0400 11/06/21 0754 11/06/21 0808 11/06/21 1337   BP: (!) 112/57  127/70 120/61   Pulse: 61  72 69   Resp: 21 19 18 18   Temp: 97.9 °F (36.6 °C)  97.6 °F (36.4 °C) 97.8 °F (36.6 °C)   TempSrc: Oral  Oral Oral   SpO2: 94% 98%  93%   Weight: 211 lb 10.3 oz (96 kg)      Height:         Weight: Weight: 211 lb 10.3 oz (96 kg)     24 hour intake/output:No intake or output data in the 24 hours ending 11/22/21 1045      General appearance:  No apparent distress, appears stated age and cooperative. HEENT:  Normal cephalic, atraumatic without obvious deformity. Pupils equal, round, and reactive to light. Extra ocular muscles intact. Conjunctivae/corneas clear. Neck: Supple, with full range of motion. No jugular venous distention. Trachea midline. Respiratory:  Normal respiratory effort. Clear to auscultation, bilaterally without Rales/Wheezes/Rhonchi. Cardiovascular:  Regular rate and rhythm with normal S1/S2 without murmurs, rubs or gallops. Abdomen: Soft, non-tender, non-distended with normal bowel sounds. Musculoskeletal:  No clubbing, cyanosis or edema bilaterally. Full range of motion without deformity. Skin: Skin color, texture, turgor normal.  No rashes or lesions. Neurologic:  Neurovascularly intact without any focal sensory/motor deficits.  Cranial nerves: II-XII intact, grossly non-focal.  Psychiatric:  Alert and oriented, thought content appropriate, normal insight  Capillary Refill: Brisk,< 3 seconds   Peripheral Pulses: +2 palpable, equal bilaterally       Labs: For convenience and continuity at follow-up the following most recent labs are provided:      CBC:    Lab Results   Component Value Date    WBC 17.6 11/06/2021    HGB 13.9 11/06/2021    HCT 40.5 11/06/2021     11/06/2021       Renal:    Lab Results   Component Value Date     11/03/2021    K 4.1 11/03/2021    CL 98 11/03/2021    CO2 26 11/03/2021    BUN 10 11/03/2021    CREATININE 0.6 11/03/2021    CALCIUM 9.0 11/03/2021         Significant Diagnostic Studies    Radiology:   XR CHEST PORTABLE   Final Result      No acute intrathoracic process. **This report has been created using voice recognition software. It may contain minor errors which are inherent in voice recognition technology. **      Final report electronically signed by Dr. Aline Sepulveda on 11/4/2021 6:26 PM      VL PRE OP VEIN MAPPING   Final Result   1. Status post vein mapping procedure. 2. Both greater and lesser saphenous veins are widely patent with the measurements indicated in the table above. **This report has been created using voice recognition software. It may contain minor errors which are inherent in voice recognition technology. **      Final report electronically signed by Dr. Sherry Williamson on 11/3/2021 9:06 AM      CTA ABDOMINAL AORTA W BILAT RUNOFF W WO CONTRAST   Final Result       1. 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 2. Diminutive flow in the tibial and peroneal arteries bilaterally with attenuated 3 vessel runoff on the right and 2 vessel runoff on the left. 3. Moderate areas of stenosis in the iliac and common femoral arteries. 4. 5 mm noncalcified nodule in the right lower lobe. Consider follow-up CT in one year per Fleischner criteria.       Findings were discussed with Gudelia Reagan RN via telephone at the time of interpretation. **This report has been created using voice recognition software. It may contain minor errors which are inherent in voice recognition technology. **      Final report electronically signed by Dr. Rajesh Hart MD on 11/3/2021 8:44 AM      NM MYOCARDIAL SPECT REST EXERCISE OR RX    (Results Pending)          Consults:     IP CONSULT TO VASCULAR SURGERY  IP CONSULT TO CARDIOLOGY  IP CONSULT TO PULMONOLOGY  IP CONSULT TO DIETITIAN    Disposition:    [x] Home       [] TCU       [] Rehab       [] Psych       [] SNF       [] [place]       [] Other-    Condition at Discharge: Stable    Code Status:  Prior     Patient Instructions:    Discharge lab work: Activity: activity as tolerated  Diet: No diet orders on file      Follow-up visits:   TANIA Leonard - CHARLES  [facility] [address]  [phone]    On 11/15/2021  Your appointment time is at  8:15   , Arrive 15 minutes early, please bring photo ID and medications    Herson Kessler MD  [address] [place] [id number] [phone]    On 11/15/2021  Your appointment time is at  3:30   , Arrive 15 minutes early, please bring photo ID and medications    Zenia Molina, [address] [facility]  [place] [place]    Schedule an appointment as soon as possible for a visit  in 3 months, have pulmonary function test done 5 days prior to appointment     Jamir Keane [address] [facility]  Schedule an appointment as soon as possible for a visit  for diabetes control    Stalin Ott MD  [facility], [address] [place] [address]  [place] [address]  [phone]    Schedule an appointment as soon as possible for a visit in 2 weeks  follow up with cardiology     Central Scheduling  [phone]  Schedule an appointment as soon as possible for a visit  for Pulmonary Function Test prior to seeing Dr Vivian Fowler         Discharge Medications:        Medication List      START taking these medications    albuterol sulfate  (90 Base) MCG/ACT inhaler  Commonly known as: Ventolin HFA  Inhale 2 puffs into the lungs every 6 hours as needed for Wheezing or Shortness of Breath     Anoro Ellipta 62.5-25 MCG/INH Aepb inhaler  Generic drug: umeclidinium-vilanterol  Inhale 1 puff into the lungs daily     aspirin 81 MG chewable tablet  Take 1 tablet by mouth daily Indications: Cardiovascular Risk Reduction, blood thinner  Replaces: aspirin 81 MG tablet     glipiZIDE 10 MG tablet  Commonly known as: GLUCOTROL  Take 1 tablet by mouth every morning (before breakfast)     rivaroxaban 2.5 MG Tabs tablet  Commonly known as: XARELTO  Take 1 tablet by mouth 2 times daily        CHANGE how you take these medications    atorvastatin 20 MG tablet  Commonly known as: LIPITOR  Take 1 tablet by mouth daily  What changed:   · how much to take  · how to take this  · when to take this  · additional instructions     carvedilol 12.5 MG tablet  Commonly known as: COREG  Take 1 tablet by mouth 2 times daily (with meals)  What changed:   · medication strength  · how much to take  · how to take this  · when to take this  · additional instructions     clopidogrel 75 MG tablet  Commonly known as: PLAVIX  Take 1 tablet by mouth daily  What changed:   · how much to take  · how to take this  · when to take this  · additional instructions     lisinopril 5 MG tablet  Commonly known as: PRINIVIL;ZESTRIL  Take 1 tablet by mouth 2 times daily  What changed:   · medication strength  · how much to take  · how to take this  · when to take this  · additional instructions        CONTINUE taking these medications    furosemide 40 MG tablet  Commonly known as: LASIX  Take 1 tablet by mouth daily        STOP taking these medications    aspirin 81 MG tablet  Replaced by: aspirin 81 MG chewable tablet        ASK your doctor about these medications    predniSONE 20 MG tablet  Commonly known as: DELTASONE  Take 2 tablets by mouth daily for 3 doses  Ask about: Should I take this medication? Where to Get Your Medications      These medications were sent to [id number], [address] [phone]  [address] [facility] [address]    Phone: [phone]   · carvedilol 12.5 MG tablet  · glipiZIDE 10 MG tablet  · lisinopril 5 MG tablet     These medications were sent to [facility] [address], [address] [place] [address]  [address]st Floor, [facility] [place] [address]    Phone: [phone]   · albuterol sulfate  (90 Base) MCG/ACT inhaler  · Anoro Ellipta 62.5-25 MCG/INH Aepb inhaler  · aspirin 81 MG chewable tablet  · atorvastatin 20 MG tablet  · clopidogrel 75 MG tablet  · furosemide 40 MG tablet  · predniSONE 20 MG tablet  · rivaroxaban 2.5 MG Tabs tablet         Time Spent on discharge is more than 45 minutes in the examination, evaluation, counseling and review of medications and discharge plan. Signed: Thank you TANIA Novoa - NELY for the opportunity to be involved in this patient's care.     Electronically signed by Monica Iyer DO on 11/22/2021 at 10:45 AM

## 2021-11-24 ENCOUNTER — CARE COORDINATION (OUTPATIENT)
Dept: CASE MANAGEMENT | Age: 66
End: 2021-11-24

## 2021-11-24 NOTE — CARE COORDINATION
Justin [facility] Transitions Follow Up Call    2021    Patient: Jose Humphrey  Patient : 1955   MRN: [id number]  Reason for Admission:   Discharge Date: 21 RARS: Readmission Risk Score: 9.1 ( )         Spoke with: Con Child, patient    Contacted patient for transition follow up. Mr. Monica Myles states that he is doing okay. States the pain in his toe is not too bad. He informed CTN that he did not follow up with his PCP yesterday. States that he did not have transportation. He informed CTN that the offices usually have to call the transportation company which he states has always been done that way. He told the clinic that but they told him they do not do that. He states he has not had a PCP in years. Discussed importance of having a PCP. He verbalized understanding. CTN gave him the phone number to find a physician in [facility]. He states he will call. He is aware of his appointment with cardiology on Tuesday, 21. He confirmed his transportation was set up. Discussed when to contact his provider with any new or worsening symptoms. Also discussed that offices usually have on call doctors when calling after hours or on the weekends. He verbalized understanding. He does not have any questions or concerns at this time. Will continue to follow. Care Transitions Follow Up Call    Needs to be reviewed by the provider   Additional needs identified to be addressed with provider: No  none             Method of communication with provider : none      Care Transition Nurse (CTN) contacted the patient by telephone to follow up after admission on 21-21. Verified name and  with patient as identifiers. Addressed changes since last contact: none  Discussed follow-up appointments. If no appointment was previously scheduled, appointment scheduling offered: Yes. Is follow up appointment scheduled within 7 days of discharge? Yes.      CTN reviewed discharge instructions, medical action plan and red flags with patient and discussed any barriers to care and/or understanding of plan of care after discharge. Discussed appropriate site of care based on symptoms and resources available to patient including: Specialist. The patient agrees to contact the PCP office for questions related to their healthcare. Patients top risk factors for readmission: lack of knowledge about disease, medical condition-Alcohol abuse, CHF, HTN, Depression, NSTEMI, heart disease, medication management and polypharmacy  Interventions to address risk factors: Education of patient/family/caregiver/guardian to support self-management-signs/symptoms of when to contact provider      Non-[facility] follow up appointment(s):     CTN provided contact information for future needs. Plan for follow-up call in 7-10 days based on severity of symptoms and risk factors. Plan for next call: symptom management-any new or worsening symptoms      Care Transitions Subsequent and Final Call    Subsequent and Final Calls  Do you have any ongoing symptoms?: Yes  Patient-reported symptoms: Pain  Have your medications changed?: No  Do you have any questions related to your medications?: No  Do you currently have any active services?: No  Do you have any needs or concerns that I can assist you with?: No  Care Transitions Interventions  Other Interventions:            Follow Up  Future Appointments   Date Time Provider William Wang   11/30/2021  1:00 PM TANIA Dimas - CNP N [facility] Heart [place]   12/6/2021  2:30 PM STR VASCULAR LAB ROOM 1 [facility] VAS LAB [facility] Radiolog   12/13/2021 10:00 AM Germania Cramer MD N SRPX CT/CV [place]   2/16/2022 11:00 AM STR PULMONARY FUNCTION ROOM 1 [facility] PFT [facility]   2/23/2022  1:20 PM Lionel Hayden, [address] [facility], RN

## 2021-11-26 NOTE — PROGRESS NOTES
CLINICAL PHARMACY NOTE: MEDS TO BEDS    Total # of Prescriptions Filled: 3   The following medications were delivered to the patient:  Anoro Ellipta 62.5-25 mcg  Ventolin   Prednisone 20 mg    Additional Documentation:

## 2021-11-30 ENCOUNTER — OFFICE VISIT (OUTPATIENT)
Dept: CARDIOLOGY CLINIC | Age: 66
End: 2021-11-30
Payer: MEDICARE

## 2021-11-30 VITALS
SYSTOLIC BLOOD PRESSURE: 144 MMHG | HEART RATE: 96 BPM | HEIGHT: 69 IN | DIASTOLIC BLOOD PRESSURE: 82 MMHG | BODY MASS INDEX: 34.04 KG/M2 | WEIGHT: 229.8 LBS

## 2021-11-30 DIAGNOSIS — E78.5 HYPERLIPIDEMIA, UNSPECIFIED HYPERLIPIDEMIA TYPE: ICD-10-CM

## 2021-11-30 DIAGNOSIS — I25.10 CORONARY ARTERY DISEASE INVOLVING NATIVE CORONARY ARTERY OF NATIVE HEART WITHOUT ANGINA PECTORIS: ICD-10-CM

## 2021-11-30 DIAGNOSIS — I10 ESSENTIAL HYPERTENSION: Primary | ICD-10-CM

## 2021-11-30 DIAGNOSIS — I73.9 PAD (PERIPHERAL ARTERY DISEASE) (HCC): ICD-10-CM

## 2021-11-30 PROCEDURE — 1111F DSCHRG MED/CURRENT MED MERGE: CPT | Performed by: NURSE PRACTITIONER

## 2021-11-30 PROCEDURE — G8484 FLU IMMUNIZE NO ADMIN: HCPCS | Performed by: NURSE PRACTITIONER

## 2021-11-30 PROCEDURE — 4004F PT TOBACCO SCREEN RCVD TLK: CPT | Performed by: NURSE PRACTITIONER

## 2021-11-30 PROCEDURE — 99213 OFFICE O/P EST LOW 20 MIN: CPT | Performed by: NURSE PRACTITIONER

## 2021-11-30 PROCEDURE — G8427 DOCREV CUR MEDS BY ELIG CLIN: HCPCS | Performed by: NURSE PRACTITIONER

## 2021-11-30 PROCEDURE — 3017F COLORECTAL CA SCREEN DOC REV: CPT | Performed by: NURSE PRACTITIONER

## 2021-11-30 PROCEDURE — G8417 CALC BMI ABV UP PARAM F/U: HCPCS | Performed by: NURSE PRACTITIONER

## 2021-11-30 RX ORDER — LISINOPRIL 5 MG/1
5 TABLET ORAL 2 TIMES DAILY
Qty: 30 TABLET | Refills: 3 | Status: SHIPPED | OUTPATIENT
Start: 2021-11-30 | End: 2022-03-10 | Stop reason: SDUPTHER

## 2021-11-30 RX ORDER — ATORVASTATIN CALCIUM 20 MG/1
20 TABLET, FILM COATED ORAL DAILY
Qty: 30 TABLET | Refills: 3 | Status: SHIPPED | OUTPATIENT
Start: 2021-11-30 | End: 2022-03-10 | Stop reason: SDUPTHER

## 2021-11-30 RX ORDER — FUROSEMIDE 40 MG/1
40 TABLET ORAL DAILY
Qty: 30 TABLET | Refills: 3 | Status: SHIPPED | OUTPATIENT
Start: 2021-11-30 | End: 2022-03-10 | Stop reason: SDUPTHER

## 2021-11-30 RX ORDER — CLOPIDOGREL BISULFATE 75 MG/1
75 TABLET ORAL DAILY
Qty: 30 TABLET | Refills: 3 | Status: SHIPPED | OUTPATIENT
Start: 2021-11-30 | End: 2022-03-10 | Stop reason: SDUPTHER

## 2021-11-30 RX ORDER — CARVEDILOL 12.5 MG/1
12.5 TABLET ORAL 2 TIMES DAILY WITH MEALS
Qty: 60 TABLET | Refills: 3 | Status: SHIPPED | OUTPATIENT
Start: 2021-11-30 | End: 2022-10-11

## 2021-11-30 NOTE — PATIENT INSTRUCTIONS
Continue current medications as prescribed. Continue to care for your feet as you have been. Continue to follow with Dr. Ines Valdez regarding the circulation in your legs. Continue your efforts to stop smoking - you are doing a good job!!!  Try cutting drinking straws to length of cigarette and keep near by. Great job on cutting down on your drinking. Follow-up with your PCP as scheduled. Follow-up with Dr. Tyson Keep in 6 months as scheduled or sooner if need.

## 2021-11-30 NOTE — PROGRESS NOTES
Patient presents in office today for a hospital fu. Last EKG done 11/02/2021. Denies CP, sob, light headedness, dizziness, DANA, or palpitations.

## 2021-11-30 NOTE — PROGRESS NOTES
[facility] PROFESSIONAL SERVICES  [facility]   [address]   [address] [address]   Dept: [phone]   Dept Fax: [phone]: [phone]      Chief Complaint   Patient presents with   Genoveva Rivas Follow-up     hospital       Cardiologist:  Dr. Manish Melchor encounter 11/2 - 11/6/21 for PAD. Admitted with foot pain. Found to have complete occlusion of the proximal SFA bilaterally. Moderate stenosis of iliac and common femoral arteries also found. Vascular surgery was consulted. Continues to follow with vascular surgery; managed on Xarelto and Plavix as well as Cilostazol. Echo and stress done while in hospital; EF 55% with Nml LV; stress neg for ischemia. Last seen in office 6/10/2019 per QUITA Leon CNP as 6 month follow-up for known CAD. He needed med refills at that time. HE had been out of meds for 3 months. Per office note: He denies chest pain, palpitations, DANA, or syncope. He has sob with exertion which is not new or increased. He continues to smoke about 1 and 1/2 packs of cigarettes daily. Drinks 4-5 beers daily. Checks blood pressure at home and states it is usually around 130's over 60's-70's. A/P: No orders of the defined types were placed in this encounter. Stable cardiac wise. Denies chest pain. Breathing is at his baseline. No new cardiac complaints. Discussed health hazards associated with smoking and excessive daily alcohol use. He stated he has smoked and drank for 40 years and he is not going to stop. He states he knows he could die and he is OK with that. Discussed use, benefit, and side effects of prescribed medications. All patient questions answered. Pt voiced understanding. Instructed to continue current medications, diet and exercise. Continue risk factor modification and medical management. Patient agreed with treatment plan. Follow up as directed.       Today's visit:   Subjective:  Still has some pain in middle toe right foot  Pain has improved  Now compliant with meds  No chest pain or dyspnea  No swelling LE  Painful dallas underside mid right toe  Throbbing in middle toe.   Has cut down on smoking - only 5 cigarettes/day from 1 pack per/day  Only one beer in last month    General:   No fever, no chills, No fatigue or weight loss/gain  Pulmonary:    No dyspnea, no wheezing  Cardiac:    Denies recent chest discomfort or palpitations  GI:     No nausea or vomiting, no abdominal pain, no black or tarry stools,     no blood in stools  Neuro:      No dizziness or light headedness  Musculoskeletal:  No recent active issues, no new musculoskeletal pain - as above  Extremities:   No swelling or claudication symptoms      Past Medical History:   Diagnosis Date    CAD (coronary artery disease)     Chronic diastolic congestive heart failure (HCC) 3/19/2018    Hyperlipidemia     Hypertension     [facility]    Tobacco abuse        No Known Allergies    Current Outpatient Medications   Medication Sig Dispense Refill    rivaroxaban (XARELTO) 2.5 MG TABS tablet Take 1 tablet by mouth 2 times daily 180 tablet 3    atorvastatin (LIPITOR) 20 MG tablet Take 1 tablet by mouth daily 30 tablet 3    carvedilol (COREG) 12.5 MG tablet Take 1 tablet by mouth 2 times daily (with meals) 60 tablet 3    clopidogrel (PLAVIX) 75 MG tablet Take 1 tablet by mouth daily 30 tablet 3    furosemide (LASIX) 40 MG tablet Take 1 tablet by mouth daily 30 tablet 3    lisinopril (PRINIVIL;ZESTRIL) 5 MG tablet Take 1 tablet by mouth 2 times daily 30 tablet 3    cilostazol (PLETAL) 100 MG tablet Take 1 tablet by mouth 2 times daily 60 tablet 3    glipiZIDE (GLUCOTROL) 10 MG tablet Take 1 tablet by mouth every morning (before breakfast) 60 tablet 3    umeclidinium-vilanterol (ANORO ELLIPTA) 62.5-25 MCG/INH AEPB inhaler Inhale 1 puff into the lungs daily 1 each 11    albuterol sulfate HFA (VENTOLIN HFA) 108 (90 Base) MCG/ACT inhaler Inhale 2 puffs into the lungs every 6 hours as needed for Wheezing or Shortness of Breath 1 each 11    aspirin 81 MG chewable tablet Take 1 tablet by mouth daily Indications: Cardiovascular Risk Reduction, blood thinner 30 tablet 1    cilostazol (PLETAL) 50 MG tablet Take 1 tablet by mouth daily for 3 days 3 tablet 0    cilostazol (PLETAL) 50 MG tablet Take 1 tablet by mouth 2 times daily for 4 days 8 tablet 0     No current facility-administered medications for this visit. Social History     Socioeconomic History    Marital status: Single     Spouse name: None    Number of children: None    Years of education: None    Highest education level: None   Occupational History    None   Tobacco Use    Smoking status: Current Every Day Smoker     Packs/day: 1.50     Years: 40.00     Pack years: 60.00     Types: Cigarettes    Smokeless tobacco: Never Used   Vaping Use    Vaping Use: Never used   Substance and Sexual Activity    Alcohol use: Yes     Comment: 12 pk per day/ has cut down     Drug use: No    Sexual activity: None   Other Topics Concern    None   Social History Narrative    None     Social Determinants of Health     Financial Resource Strain:     Difficulty of Paying Living Expenses: Not on file   Food Insecurity:     Worried About Running Out of Food in the Last Year: Not on file    Karolina of Food in the Last Year: Not on file   Transportation Needs:     Lack of Transportation (Medical): Not on file    Lack of Transportation (Non-Medical):  Not on file   Physical Activity:     Days of Exercise per Week: Not on file    Minutes of Exercise per Session: Not on file   Stress:     Feeling of Stress : Not on file   Social Connections:     Frequency of Communication with Friends and Family: Not on file    Frequency of Social Gatherings with Friends and Family: Not on file    Attends [employer] Services: Not on file    Active Member of Clubs or Organizations: Not on file    Attends Club or Organization Meetings: Not on file    Marital Status: Not on file   Intimate Partner Violence:     Fear of Current or Ex-Partner: Not on file    Emotionally Abused: Not on file    Physically Abused: Not on file    Sexually Abused: Not on file   Housing Stability:     Unable to Pay for Housing in the Last Year: Not on file    Number of [place] in the Last Year: Not on file    Unstable Housing in the Last Year: Not on file       Family History   Problem Relation Age of Onset    Diabetes Mother     Stroke Mother     Heart Disease Father     High Blood Pressure Brother        Blood pressure (!) 144/82, pulse 96, height 5' 9\" (1.753 m), weight 229 lb 12.8 oz (104.2 kg). General:   Well developed, well nourished, in no acute distress  Lungs:   Clear to auscultation with good aeration  Heart:    Normal S1 S2,  regular rhythm, normal rate     No murmur, rubs, or gallops  Abdomen:   Soft, non tender, no organomegalies, positive bowel sounds  Extremities:   No edema, no cyanosis  Neurological:   Awake, alert, oriented. No obvious focal deficits  Musculoskeletal:  No obvious deformities    EK21  EKG 12 Lead  Order: [id number]   Status: Final result     Visible to patient: No (not released)     Next appt:  Today at 03:30 PM in Cardiothoracic Surgery Igor Perez MD)     0 Result Notes     Ref Range & Units 21 2216   Ventricular Rate BPM 88    Atrial Rate BPM 88    P-R Interval ms 140    QRS Duration ms 122    Q-T Interval ms 390    QTc Calculation (Bazett) ms 471    P Axis degrees 64    R Axis degrees -94    T Axis degrees 30    Resulting Agency  [facility]             Narrative & Impression    Normal sinus rhythm  Right bundle branch block  Possible Inferior infarct , age undetermined  Abnormal ECG  When compared with ECG of 2021 15:39,  No significant change was found  Confirmed by Gabe Pena ([id number]) on 11/3/2021 5:16:48 PM             Radiology:   XR CHEST PORTABLE   Final Result     No acute intrathoracic process.                   **This report has been created using voice recognition software. It may contain minor errors which are inherent in voice recognition technology. **       Final report electronically signed by Dr. Bri Vincent on 11/4/2021 6:26 PM       VL PRE OP VEIN MAPPING   Final Result   1. Status post vein mapping procedure. 2. Both greater and lesser saphenous veins are widely patent with the measurements indicated in the table above.               **This report has been created using voice recognition software. It may contain minor errors which are inherent in voice recognition technology. **       Final report electronically signed by Dr. Aurea Gonsalves on 11/3/2021 9:06 AM       CTA ABDOMINAL AORTA W BILAT RUNOFF W WO CONTRAST   Final Result       1. 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 2. Diminutive flow in the tibial and peroneal arteries bilaterally with attenuated 3 vessel runoff on the right and 2 vessel runoff on the left. 3. Moderate areas of stenosis in the iliac and common femoral arteries. 4. 5 mm noncalcified nodule in the right lower lobe. Consider follow-up CT in one year per Fleischner criteria.       Findings were discussed with Vera Flores RN via telephone at the time of interpretation.                   **This report has been created using voice recognition software. It may contain minor errors which are inherent in voice recognition technology. **       Final report electronically signed by Dr. Howard Desir MD on 11/3/2021 8:44 AM     Lexiscan stress: 11/2/21     Summary   Lexiscan EKG stress test is not suggestive for ischemia. The nuclear images is not suggestive for myocardial ischemia.       Signatures      ----------------------------------------------------------------   Electronically signed by Maryse Nicole MD (Interpreting   Cardiologist) on 11/05/2021 at 19:04    Echo: 11/4/21   Summary   Technically difficult examination. Normal left ventricle size and systolic function. Ejection fraction was   estimated at 55 %. There were no regional left ventricular wall motion   abnormalities and wall thickness was within normal limits. Signature    ----------------------------------------------------------------   Electronically signed by Maryse Nicole MD (Interpreting   physician) on 11/04/2021 at 08:04 PM      Echo: 11/12/18  Summary   Technically difficult examination.   Normal left ventricle size and systolic function. Ejection fraction was   estimated at 60 %. There were no regional left ventricular wall motion   abnormalities and wall thickness was within normal limits.   Doppler parameters were consistent with abnormal left ventricular   relaxation (grade 1 diastolic dysfunction).      Signature      ----------------------------------------------------------------   Electronically signed by Maryse Nicole MD     PCI: 10/1/2013  Percutaneous coronary intervention of the mid LAD with 3.0 x 18 mm  drug-eluting stent postdilated up to 3.5 mm proximally.     INDICATIONS FOR PROCEDURE:  A 66year old patient with a past medical history  of significant tobacco abuse, had ST elevation MI a few months ago. [facility] was  taken to [facility] that time he refused any procedure and  he was discharged home with appropriate medical management.  Then he was seen  by Dr. Sierra Gaitan and at that time he agreed for the left heart  catheterization.  His right coronary artery was totally occluded and he had  around 60-70 percent disease in the LAD to the circumflex artery.  At that  time plan was to do viability study.  Based on the viability study, go further  for either multivessel intervention or open heart surgery.  The viability  study came back abnormal showing nonviable segment of the inferior wall.  So  basically the right coronary artery which is totally occluded and the  territory supplied by his nonviable.  So we paid attention to LAD as well as  the left circumflex artery.  The patient had a stress test done which did not  show any ischemia in post territory but the patient had medical therapy and  still having chest pain.  So we decided to intervene on LAD today.     PROCEDURE:  Right radial artery cannulation, percutaneous coronary  intervention of the mid LAD with 3.0x18 mm drug eluting stent, proximal half  was taken up to 3.5 mm. Diagnosis Orders   1. Essential hypertension  furosemide (LASIX) 40 MG tablet    lisinopril (PRINIVIL;ZESTRIL) 5 MG tablet   2. PAD (peripheral artery disease) (HCC)  rivaroxaban (XARELTO) 2.5 MG TABS tablet   3. Hyperlipidemia, unspecified hyperlipidemia type  atorvastatin (LIPITOR) 20 MG tablet   4. Coronary artery disease involving native coronary artery of native heart without angina pectoris  carvedilol (COREG) 12.5 MG tablet    clopidogrel (PLAVIX) 75 MG tablet       No orders of the defined types were placed in this encounter. Continue Dr Tory Dalton current treatment plan    Patient Instructions   Continue current medications as prescribed. Continue to care for your feet as you have been. Continue to follow with Dr. Christiano Gomez regarding the circulation in your legs. Continue your efforts to stop smoking - you are doing a good job!!!  Try cutting drinking straws to length of cigarette and keep near by. Great job on cutting down on your drinking. Follow-up with your PCP as scheduled. Follow-up with Dr. Roxanne Hoffman in 6 months as scheduled or sooner if need. Return in about 6 months (around 5/30/2022), or if symptoms worsen or fail to improve, for Dr. Roxanne Hoffman.       Marla Verduzco, APRN - CNP

## 2021-12-02 ENCOUNTER — CARE COORDINATION (OUTPATIENT)
Dept: CASE MANAGEMENT | Age: 66
End: 2021-12-02

## 2021-12-02 NOTE — CARE COORDINATION
[facility] Transitions Follow Up Call    2021    Patient: Zi Caraballo  Patient : 1955   MRN: [id number]  Reason for Admission:   Discharge Date: 21 RARS: Readmission Risk Score: 9.1 ( )         Spoke with: Sylvia Padilla, patient    Contacted patient for care transition follow up. Mr. Ron Jonas states that he seems to be doing pretty good. States things are slowly coming along for him. Reports his foot has gotten better. States he is only having pain/discomfort in the tip part of his foot. Pain is not constant. He denies having any swelling, shortness of breath, chest pain/discomfort. He informed CTN that he got all of his medications transferred to [facility] because it is closer to him. Patient aware that he has an appointment at the vascular lab on Monday, 21. He states that his transportation was set up for him. Briefly discussed when to contact his provider with any new or worsening symptoms. He verbalized understanding. No questions or concerns at this time. Care Transitions Follow Up Call    Needs to be reviewed by the provider   Additional needs identified to be addressed with provider: No:none             Method of communication with provider : none      Care Transition Nurse (CTN) contacted the patient by telephone to follow up after admission on 21-21. Verified name and  with patient as identifiers. Addressed changes since last contact: none  Discussed follow-up appointments. If no appointment was previously scheduled, appointment scheduling offered: Yes. CTN reviewed discharge instructions, medical action plan and red flags with patient and discussed any barriers to care and/or understanding of plan of care after discharge. Discussed appropriate site of care based on symptoms and resources available to patient including: PCP and Specialist. The patient agrees to contact the PCP office for questions related to their healthcare. Patients top risk factors for readmission: lack of knowledge about disease  medical condition-Alcohol abuse, CHF, HTN, Depression, NSTEMI, heart disease  medication management  Interventions to address risk factors: Education of patient/family/caregiver/guardian to support self-management-signs/symptoms and when to contact provider. Non-[facility] follow up appointment(s):     CTN provided contact information for future needs. Plan for follow-up call in 5-7 days based on severity of symptoms and risk factors. Plan for next call: symptom management-any new or worsening symptoms    Care Transitions Subsequent and Final Call    Subsequent and Final Calls  Do you have any ongoing symptoms?: Yes  Patient-reported symptoms: Pain  Have your medications changed?: No  Do you have any questions related to your medications?: No  Do you currently have any active services?: No  Do you have any needs or concerns that I can assist you with?: No  Care Transitions Interventions  Other Interventions:            Follow Up  Future Appointments   Date Time Provider William Wang   12/6/2021  2:30 PM STR VASCULAR LAB ROOM 1 [facility] VAS LAB [facility] Radiolog   12/13/2021 10:00 AM Charu Hussein MD N SRPX CT/CV [facility] - [address] [facility]   2/16/2022 11:00 AM STR PULMONARY FUNCTION ROOM 1 [facility] PFT [facility]   2/23/2022  1:20 PM Khadijah Cali, [address]   5/31/2022  1:00 PM Camille Santos MD N [facility] P - Jackie Peralta RN

## 2021-12-06 ENCOUNTER — HOSPITAL ENCOUNTER (OUTPATIENT)
Dept: INTERVENTIONAL RADIOLOGY/VASCULAR | Age: 66
Discharge: HOME | End: 2021-12-06
Payer: MEDICARE

## 2021-12-06 DIAGNOSIS — I73.9 PAD (PERIPHERAL ARTERY DISEASE) (HCC): ICD-10-CM

## 2021-12-06 DIAGNOSIS — I70.209 FEMORAL ARTERY OCCLUSION (HCC): ICD-10-CM

## 2021-12-06 DIAGNOSIS — I73.9 PAD (PERIPHERAL ARTERY DISEASE) (HCC): Primary | ICD-10-CM

## 2021-12-06 DIAGNOSIS — Z01.818 ENCOUNTER FOR OTHER PREPROCEDURAL EXAMINATION: ICD-10-CM

## 2021-12-06 PROCEDURE — 93925 LOWER EXTREMITY STUDY: CPT

## 2021-12-08 ENCOUNTER — CARE COORDINATION (OUTPATIENT)
Dept: CASE MANAGEMENT | Age: 66
End: 2021-12-08

## 2021-12-08 NOTE — CARE COORDINATION
[facility] Transitions Follow Up Call    2021    Patient: Shaun Hart  Patient : 1955   MRN: [id number]  Reason for Admission:   Discharge Date: 21 RARS: Readmission Risk Score: 9.1 ( )         Spoke with: Louise Jama, patient    Contacted patient for transition follow up. Mr. Sydnee Leblanc states that he seems to be doing pretty good. Continues to have pain/discomfort in right 3rd toe but states pain has gotten better and he is able to get sleep. Reports pain/discomfort is intermittent. States he has \"some\" swelling mainly in his right foot. Encouraged to keep elevated. He denies having any c/o chest pain/discomfort, increased shortness of breath. He is not weighing himself. Discussed signs/symptoms and when to contact provider with any new or worsening symptoms. He verbalized understanding. He continues to decline referral to dietician. He canceled appointment with PCP. Discussed importance of having a PCP. He verbalized understanding. He has an appointment with Dr. MISTRY [facility] on Monday, 21 but states he may reschedule for a later time. No assistance needed in rescheduling appointments. He does not have any questions or needs at this time. No further outreach. Care Transitions Follow Up Call    Needs to be reviewed by the provider   Additional needs identified to be addressed with provider: No  none             Method of communication with provider : none      Care Transition Nurse (CTN) contacted the patient by telephone to follow up after admission on 21-21. Verified name and  with patient as identifiers. Addressed changes since last contact: none  Discussed follow-up appointments. If no appointment was previously scheduled, appointment scheduling offered: Yes. Is follow up appointment scheduled within 7 days of discharge?  No.    CTN reviewed discharge instructions, medical action plan and red flags with patient and discussed any barriers to care and/or understanding of plan of care after discharge. Discussed appropriate site of care based on symptoms and resources available to patient including: PCP and Specialist. The patient agrees to contact the PCP office for questions related to their healthcare. Patients top risk factors for readmission: medical condition-CHF, HTN, Depression, NSTEMI, heart disease  medication management  Interventions to address risk factors: Education of patient/family/caregiver/guardian to support self-management-signs/symptoms and when to contact provider    CTN provided contact information for future needs. No further follow-up call indicated based on severity of symptoms and risk factors. Care Transitions Subsequent and Final Call    Subsequent and Final Calls  Do you have any ongoing symptoms?: Yes  Patient-reported symptoms: Pain  Have your medications changed?: No  Do you have any questions related to your medications?: No  Do you currently have any active services?: No  Do you have any needs or concerns that I can assist you with?: No  Care Transitions Interventions  Other Interventions:            Follow Up  Future Appointments   Date Time Provider William Wang   12/13/2021 10:00 AM Carloz Mendenhall MD [facility] CT/CV P - [place] [address] [place] [address]   2/16/2022 11:00 AM [facility] PULMONARY FUNCTION ROOM 1 [facility] PFT [place] [address] [facility]   2/23/2022  1:20 PM Humberto Joe, [address]   5/31/2022  1:00 PM Clarisa Cronin MD N [facility] Heart P - [place] V[place] RN

## 2021-12-13 ENCOUNTER — OFFICE VISIT (OUTPATIENT)
Dept: CARDIOTHORACIC SURGERY | Age: 66
End: 2021-12-13
Payer: MEDICARE

## 2021-12-13 VITALS
SYSTOLIC BLOOD PRESSURE: 146 MMHG | HEIGHT: 69 IN | DIASTOLIC BLOOD PRESSURE: 72 MMHG | BODY MASS INDEX: 34.07 KG/M2 | HEART RATE: 80 BPM | WEIGHT: 230 LBS

## 2021-12-13 DIAGNOSIS — I73.9 PAD (PERIPHERAL ARTERY DISEASE) (HCC): Primary | ICD-10-CM

## 2021-12-13 PROCEDURE — G8484 FLU IMMUNIZE NO ADMIN: HCPCS | Performed by: THORACIC SURGERY (CARDIOTHORACIC VASCULAR SURGERY)

## 2021-12-13 PROCEDURE — G8427 DOCREV CUR MEDS BY ELIG CLIN: HCPCS | Performed by: THORACIC SURGERY (CARDIOTHORACIC VASCULAR SURGERY)

## 2021-12-13 PROCEDURE — 4004F PT TOBACCO SCREEN RCVD TLK: CPT | Performed by: THORACIC SURGERY (CARDIOTHORACIC VASCULAR SURGERY)

## 2021-12-13 PROCEDURE — G8417 CALC BMI ABV UP PARAM F/U: HCPCS | Performed by: THORACIC SURGERY (CARDIOTHORACIC VASCULAR SURGERY)

## 2021-12-13 PROCEDURE — 3017F COLORECTAL CA SCREEN DOC REV: CPT | Performed by: THORACIC SURGERY (CARDIOTHORACIC VASCULAR SURGERY)

## 2021-12-13 PROCEDURE — 99214 OFFICE O/P EST MOD 30 MIN: CPT | Performed by: THORACIC SURGERY (CARDIOTHORACIC VASCULAR SURGERY)

## 2021-12-13 RX ORDER — NICOTINE 21 MG/24HR
1 PATCH, TRANSDERMAL 24 HOURS TRANSDERMAL DAILY
Qty: 42 PATCH | Refills: 3 | Status: SHIPPED | OUTPATIENT
Start: 2021-12-13 | End: 2022-01-24

## 2021-12-13 NOTE — PROGRESS NOTES
CT/CV Surgery Follow Up Office Visit      Patient's Name/Date of Birth: Ke Wolf / 1955 (29 y.o.)    PCP: TANIA Wall - CNP    Date: December 13, 2021    We had the pleasure of seeing Ke Wolf in the office today, as you know this is a very pleasant 61y.o. year old male with a history of hypertension, hyperlipidemia, coronary disease, status post myocardial infarction, status post coronary angioplasty in 2013, congestive heart failure, poorly controlled diabetes mellitus(A1C 11.0), COPD, chronic tobacco abuse, and severe peripheral arterial disease    He reports much improved symptoms with the conservative treatment with Xarelto, Plavix, and Pletal.  He states that he no longer feels ischemic type of rest pain at night. He also states he can walk up to 2 blocks without any claudication. He indicates that he still smokes. PastMedical History:  Nora Sykes  has a past medical history of CAD (coronary artery disease), Chronic diastolic congestive heart failure ([address] Hyperlipidemia, Hypertension, MI (myocardial infarction) ([facility] [address] and Tobacco abuse. Past Surgical History:  The patient  has a past surgical history that includes Cardiac catheterization (3/13) and Coronary angioplasty with stent (10-1-13). Allergies: The patient has No Known Allergies.     Medications:    Current Outpatient Medications:     nicotine (NICODERM CQ) 21 MG/24HR, Place 1 patch onto the skin daily, Disp: 42 patch, Rfl: 3    rivaroxaban (XARELTO) 2.5 MG TABS tablet, Take 1 tablet by mouth 2 times daily, Disp: 180 tablet, Rfl: 3    atorvastatin (LIPITOR) 20 MG tablet, Take 1 tablet by mouth daily, Disp: 30 tablet, Rfl: 3    carvedilol (COREG) 12.5 MG tablet, Take 1 tablet by mouth 2 times daily (with meals), Disp: 60 tablet, Rfl: 3    clopidogrel (PLAVIX) 75 MG tablet, Take 1 tablet by mouth daily, Disp: 30 tablet, Rfl: 3    furosemide (LASIX) 40 MG tablet, Take 1 tablet by mouth daily, Disp: 30 tablet, Rfl: 3    lisinopril (PRINIVIL;ZESTRIL) 5 MG tablet, Take 1 tablet by mouth 2 times daily, Disp: 30 tablet, Rfl: 3    cilostazol (PLETAL) 100 MG tablet, Take 1 tablet by mouth 2 times daily, Disp: 60 tablet, Rfl: 3    glipiZIDE (GLUCOTROL) 10 MG tablet, Take 1 tablet by mouth every morning (before breakfast), Disp: 60 tablet, Rfl: 3    umeclidinium-vilanterol (ANORO ELLIPTA) 62.5-25 MCG/INH AEPB inhaler, Inhale 1 puff into the lungs daily, Disp: 1 each, Rfl: 11    albuterol sulfate HFA (VENTOLIN HFA) 108 (90 Base) MCG/ACT inhaler, Inhale 2 puffs into the lungs every 6 hours as needed for Wheezing or Shortness of Breath, Disp: 1 each, Rfl: 11    aspirin 81 MG chewable tablet, Take 1 tablet by mouth daily Indications: Cardiovascular Risk Reduction, blood thinner, Disp: 30 tablet, Rfl: 1    cilostazol (PLETAL) 50 MG tablet, Take 1 tablet by mouth daily for 3 days, Disp: 3 tablet, Rfl: 0    cilostazol (PLETAL) 50 MG tablet, Take 1 tablet by mouth 2 times daily for 4 days, Disp: 8 tablet, Rfl: 0    Family History: This patient's family history includes Diabetes in his mother; Heart Disease in his father; High Blood Pressure in his brother; Stroke in his mother. Social History:  Eleanor  reports that he has been smoking cigarettes. He has a 60.00 pack-year smoking history. He has never used smokeless tobacco. He reports current alcohol use. He reports that he does not use drugs. Vital Signs:   BP (!) 146/72 (Site: Right Upper Arm, Position: Sitting, Cuff Size: Medium Adult)   Pulse 80   Ht 5' 9\" (1.753 m)   Wt 230 lb (104.3 kg)   BMI 33.97 kg/m²     ROS:   Constitutional: Negative for activity change, chills, fatigue, fever and unexpected weight change. Respiratory: COPD. Cardiac: Negative for midsternal chest pain, arrhythmia, shortness of breath,  Vascular: 2 block claudication of the right leg. A small localized discoloration at the tip of right third toe. .  Gastrointestinal: Negative for hematochezia, melana, constipation, and N/V/D. Musculoskeletal: Negative for myalgias, amputation. Skin: Negative for color change, rash and wound. No rubor in the right foot. Neurological: Negative for dizziness or syncope. Nephrology: Negative for chronic kidney disease,     Physical Exam:  General appearance:  No acute distress, appears stated age and cooperative. Neck: No jugular venous distention. Trachea midline. No carotid bruits. Chest Wall: No deformity, midsternal scar, enlarged palpable supraclavicular lymphnode. Respiratory:  Normal respiratory effort. Clear to auscultation, bilaterally without Rales/Wheezes/Rhonch. Cardiovascular:  Regular rate and rhythm with normal S1/S2 without murmurs, rubs or gallops. Abdomen: Soft, non-tender, non-distended with normal bowel sounds. Ext: No clubbing, cyanosis or edema bilaterally. No chronic ischemic changes, No varicorsity in lower leg. Skin: Skin color, texture, turgor normal.  No rashes or lesions. No rubor. No venous stasis pigmentation. Neurologic:  Neurovascularly intact without any focal sensory/motor deficits. Peripheral Pulses: Palpable femoral pulses bilaterally. No palpable pedal pulses. Hand-held Doppler examination: Strong biphasic Doppler signal over left DP, PT and right PT. No Doppler signal over right DP. Labs:    CBC:  Lab Results   Component Value Date    WBC 17.6 11/06/2021    HGB 13.9 11/06/2021    HCT 40.5 11/06/2021    MCV 97.1 11/06/2021     11/06/2021    INR 1.06 04/01/2013     BMP:   Lab Results   Component Value Date     11/03/2021    K 4.1 11/03/2021    CL 98 11/03/2021    CO2 26 11/03/2021    BUN 10 11/03/2021    CREATININE 0.6 11/03/2021    MG 2.0 06/07/2019       Imaging: I have reviewed the CTA.       Problem List:  Patient Active Problem List   Diagnosis    NSTEMI (non-ST elevated myocardial infarction) (HCC)    Hyperglycemia    Smoker    Alcohol abuse    SOB (shortness of breath)    Sleep apnea,

## 2022-02-25 ENCOUNTER — TELEPHONE (OUTPATIENT)
Dept: CARDIOLOGY CLINIC | Age: 67
End: 2022-02-25

## 2022-02-25 NOTE — TELEPHONE ENCOUNTER
Patient called wanting to get [phone] [facility] set up for PFT appt which was ordered by pulmonary. Patient was advised to call our office. Jie Hernández at  called [facility] and they will set up patient's transport. Patient notified.

## 2022-03-10 DIAGNOSIS — I25.10 CORONARY ARTERY DISEASE INVOLVING NATIVE CORONARY ARTERY OF NATIVE HEART WITHOUT ANGINA PECTORIS: ICD-10-CM

## 2022-03-10 DIAGNOSIS — E78.5 HYPERLIPIDEMIA, UNSPECIFIED HYPERLIPIDEMIA TYPE: ICD-10-CM

## 2022-03-10 DIAGNOSIS — I10 ESSENTIAL HYPERTENSION: ICD-10-CM

## 2022-03-10 RX ORDER — LISINOPRIL 5 MG/1
5 TABLET ORAL 2 TIMES DAILY
Qty: 30 TABLET | Refills: 3 | Status: SHIPPED | OUTPATIENT
Start: 2022-03-10 | End: 2022-03-16

## 2022-03-10 RX ORDER — FUROSEMIDE 40 MG/1
40 TABLET ORAL DAILY
Qty: 30 TABLET | Refills: 3 | Status: SHIPPED | OUTPATIENT
Start: 2022-03-10 | End: 2022-10-14 | Stop reason: SDUPTHER

## 2022-03-10 RX ORDER — CLOPIDOGREL BISULFATE 75 MG/1
75 TABLET ORAL DAILY
Qty: 30 TABLET | Refills: 3 | Status: SHIPPED | OUTPATIENT
Start: 2022-03-10 | End: 2022-10-14 | Stop reason: SDUPTHER

## 2022-03-10 RX ORDER — ATORVASTATIN CALCIUM 20 MG/1
20 TABLET, FILM COATED ORAL DAILY
Qty: 30 TABLET | Refills: 3 | Status: SHIPPED | OUTPATIENT
Start: 2022-03-10 | End: 2022-10-14 | Stop reason: SDUPTHER

## 2022-03-16 DIAGNOSIS — I10 ESSENTIAL HYPERTENSION: ICD-10-CM

## 2022-03-16 RX ORDER — LISINOPRIL 5 MG/1
TABLET ORAL
Qty: 30 TABLET | Refills: 5 | Status: SHIPPED | OUTPATIENT
Start: 2022-03-16 | End: 2022-10-14 | Stop reason: SDUPTHER

## 2022-03-22 ENCOUNTER — OFFICE VISIT (OUTPATIENT)
Dept: CARDIOTHORACIC SURGERY | Age: 67
End: 2022-03-22
Payer: MEDICARE

## 2022-03-22 VITALS
BODY MASS INDEX: 34.07 KG/M2 | SYSTOLIC BLOOD PRESSURE: 150 MMHG | HEIGHT: 69 IN | WEIGHT: 230 LBS | HEART RATE: 73 BPM | DIASTOLIC BLOOD PRESSURE: 79 MMHG

## 2022-03-22 DIAGNOSIS — I73.9 PAD (PERIPHERAL ARTERY DISEASE) (HCC): Primary | ICD-10-CM

## 2022-03-22 PROCEDURE — G8427 DOCREV CUR MEDS BY ELIG CLIN: HCPCS | Performed by: THORACIC SURGERY (CARDIOTHORACIC VASCULAR SURGERY)

## 2022-03-22 PROCEDURE — 3017F COLORECTAL CA SCREEN DOC REV: CPT | Performed by: THORACIC SURGERY (CARDIOTHORACIC VASCULAR SURGERY)

## 2022-03-22 PROCEDURE — G8417 CALC BMI ABV UP PARAM F/U: HCPCS | Performed by: THORACIC SURGERY (CARDIOTHORACIC VASCULAR SURGERY)

## 2022-03-22 PROCEDURE — G8484 FLU IMMUNIZE NO ADMIN: HCPCS | Performed by: THORACIC SURGERY (CARDIOTHORACIC VASCULAR SURGERY)

## 2022-03-22 PROCEDURE — 4004F PT TOBACCO SCREEN RCVD TLK: CPT | Performed by: THORACIC SURGERY (CARDIOTHORACIC VASCULAR SURGERY)

## 2022-03-22 PROCEDURE — 99215 OFFICE O/P EST HI 40 MIN: CPT | Performed by: THORACIC SURGERY (CARDIOTHORACIC VASCULAR SURGERY)

## 2022-03-22 NOTE — PROGRESS NOTES
CT/CV Surgery Follow Up Office Visit      Patient's Name/Date of Birth: Brittany Shaikh / 1955 (28 y.o.)    PCP: TANIA John CNP    Date: March 22, 2022    We had the pleasure of seeing Brittany Shaikh in the office today, as you know this is a very pleasant 61y.o. year old male with a history of hypertension, hyperlipidemia, coronary artery disease, status post myocardial infarction, status post coronary artery angioplasty in 2013, poorly controlled diabetes mellitus, COPD, chronic tobacco abuse, and severe peripheral arterial disease. He returned to our cardiovascular surgery clinic for follow-up. He has a known occlusion of the bilateral SFA, minimal reconstitution in the distal right SFA and left popliteal artery, diminutive flow in the tibial and peroneal artery bilaterally. JOSE study showed right AMADA 0.41, PTA 0.59, left AMADA 0.65 and PTH 0.67. He smoked 1 pack/day for 49 years and still smokes. He reports no significant interval changes over last 3 months. He reports 1 block claudication of the right leg without any ischemic type of rest pain at night. He denies any nonhealing wound or ulcer in the foot. PastMedical History:  To [place]  has a past medical history of CAD (coronary artery disease), Chronic diastolic congestive heart failure ([address] Hyperlipidemia, Hypertension, MI (myocardial infarction) ([address] and Tobacco abuse. Past Surgical History:  The patient  has a past surgical history that includes Cardiac catheterization (3/13) and Coronary angioplasty with stent (10-1-13). Allergies: The patient has No Known Allergies.     Medications:    Current Outpatient Medications:     lisinopril (PRINIVIL;ZESTRIL) 5 MG tablet, take 1 tablet by mouth twice a day, Disp: 30 tablet, Rfl: 5    atorvastatin (LIPITOR) 20 MG tablet, Take 1 tablet by mouth daily, Disp: 30 tablet, Rfl: 3    clopidogrel (PLAVIX) 75 MG tablet, Take 1 tablet by mouth daily, Disp: 30 tablet, Rfl: 3   furosemide (LASIX) 40 MG tablet, Take 1 tablet by mouth daily, Disp: 30 tablet, Rfl: 3    rivaroxaban (XARELTO) 2.5 MG TABS tablet, Take 1 tablet by mouth 2 times daily, Disp: 180 tablet, Rfl: 3    carvedilol (COREG) 12.5 MG tablet, Take 1 tablet by mouth 2 times daily (with meals), Disp: 60 tablet, Rfl: 3    glipiZIDE (GLUCOTROL) 10 MG tablet, Take 1 tablet by mouth every morning (before breakfast), Disp: 60 tablet, Rfl: 3    umeclidinium-vilanterol (ANORO ELLIPTA) 62.5-25 MCG/INH AEPB inhaler, Inhale 1 puff into the lungs daily, Disp: 1 each, Rfl: 11    albuterol sulfate HFA (VENTOLIN HFA) 108 (90 Base) MCG/ACT inhaler, Inhale 2 puffs into the lungs every 6 hours as needed for Wheezing or Shortness of Breath, Disp: 1 each, Rfl: 11    aspirin 81 MG chewable tablet, Take 1 tablet by mouth daily Indications: Cardiovascular Risk Reduction, blood thinner, Disp: 30 tablet, Rfl: 1    nicotine (NICODERM CQ) 21 MG/24HR, Place 1 patch onto the skin daily, Disp: 42 patch, Rfl: 3    cilostazol (PLETAL) 50 MG tablet, Take 1 tablet by mouth daily for 3 days, Disp: 3 tablet, Rfl: 0    cilostazol (PLETAL) 50 MG tablet, Take 1 tablet by mouth 2 times daily for 4 days, Disp: 8 tablet, Rfl: 0    cilostazol (PLETAL) 100 MG tablet, Take 1 tablet by mouth 2 times daily, Disp: 60 tablet, Rfl: 3    Family History: This patient's family history includes Diabetes in his mother; Heart Disease in his father; High Blood Pressure in his brother; Stroke in his mother. Social History:  [facility]  reports that he has been smoking cigarettes. He has a 60.00 pack-year smoking history. He has never used smokeless tobacco. He reports current alcohol use. He reports that he does not use drugs.     Vital Signs:   BP (!) 150/79 (Site: Left Upper Arm, Position: Sitting, Cuff Size: Medium Adult)   Pulse 73   Ht 5' 9\" (1.753 m)   Wt 230 lb (104.3 kg)   BMI 33.97 kg/m²     ROS:   Constitutional: Negative for activity change, chills, fatigue, fever and unexpected weight change. Respiratory: COPD. Cardiac: History of coronary stenting. Denied any midsternal chest pain or indigestion-like symptoms. Vascular: Known peripheral arterial disease including total occlusion of bilateral SFA. Gastrointestinal: Negative for hematochezia, melana, constipation, and N/V/D. Musculoskeletal: Negative for myalgias, amputation. Skin: Negative for color change, rash and wound. Neurological: Negative for dizziness or syncope. Nephrology: Negative for chronic kidney disease,     Physical Exam:  General appearance:  No acute distress, appears stated age and cooperative. Neck: No jugular venous distention. Trachea midline. No carotid bruits. Chest Wall: No deformity, midsternal scar, enlarged palpable supraclavicular lymphnode. Respiratory:   Decreased breathing sound at bases. Cardiovascular:  Regular rate and rhythm with normal S1/S2 without murmurs, rubs or gallops. Abdomen: Soft, non-tender, non-distended with normal bowel sounds. Ext: Mild rubor in the right foot. Callus on the plantar surface of right third toe. Otherwise no ulcer. Skin:  Mild rubor in the right toes. Neurologic:  Neurovascularly intact without any focal sensory/motor deficits. Peripheral Pulses: +1 palpable femoral pulses bilaterally. No palpable pedal pulses. Hand-held Doppler examination: Biphasic Doppler signal over left DP and PT. Broad monophasic Doppler signal over right DP and PT. Labs:    CBC:  Lab Results   Component Value Date    WBC 17.6 11/06/2021    HGB 13.9 11/06/2021    HCT 40.5 11/06/2021    MCV 97.1 11/06/2021     11/06/2021    INR 1.06 04/01/2013     BMP:   Lab Results   Component Value Date     11/03/2021    K 4.1 11/03/2021    CL 98 11/03/2021    CO2 26 11/03/2021    BUN 10 11/03/2021    CREATININE 0.6 11/03/2021    MG 2.0 06/07/2019       Imaging: I have reviewed previous CTA.       Problem List:  Patient Active Problem List   Diagnosis    NSTEMI (non-ST elevated myocardial infarction) ([facility])    Hyperglycemia    Smoker    Alcohol abuse    SOB (shortness of breath)    Sleep apnea, obstructive    Fatigue    Daytime somnolence    Depression    Heart disease    Obesity (BMI 30.0-34. 9)    Presence of stent in LAD coronary artery stent    Cardiomy[facility] [address]    Essential hypertension    Coronary artery disease involving native coronary artery of native heart without angina pectoris    Pure hypercholesterolemia    Chronic diastolic congestive heart failure (HCC)    SOB (shortness of breath) on exertion    Ischemia of right lower extremity    Peripheral artery disease ([facility])       Assessment: Severe peripheral arterial disease with 1 block claudication. No rest pain. Currently on Plavix, Xarelto, and cilostazol. Plan 3/22/22:  1) stop smoking. I had another long discussion with the patient for the importance of cessation of smoking. 2) continue current conservative treatment. 3) follow-up in 3 months. Thank you for allowing us to be involved in the patient's care.     Electronically by Nitish Shields MD  on 3/22/2022 at 3:01 PM

## 2022-04-28 ENCOUNTER — HOSPITAL ENCOUNTER (OUTPATIENT)
Dept: PULMONOLOGY | Age: 67
Discharge: HOME | End: 2022-04-28
Payer: MEDICARE

## 2022-04-28 DIAGNOSIS — J44.9 STAGE 3 SEVERE COPD BY GOLD CLASSIFICATION (HCC): ICD-10-CM

## 2022-04-28 DIAGNOSIS — F17.200 SMOKER: ICD-10-CM

## 2022-04-28 PROCEDURE — 94060 EVALUATION OF WHEEZING: CPT

## 2022-04-28 PROCEDURE — 94726 PLETHYSMOGRAPHY LUNG VOLUMES: CPT

## 2022-04-28 PROCEDURE — 94729 DIFFUSING CAPACITY: CPT

## 2022-04-28 NOTE — PROGRESS NOTES
Prescreening performed prior to testing. The following symptoms may indicate COVID-19 infection:        One of the following criteria:   Temperature taken, patient temperature was 97.6 F. Fever greater 100.0 F -no  New onset cough -  no  New onset shortness of breath -no  New onset difficulty breathing -no        And/or   Two or more of the following criteria:  New onset muscle aches -no  New onset headache -no  New onset sore throat -no  New onset loss of smell/taste -no  New onset diarrhea -no    Patient's screening was negative. PFT will be performed.

## 2022-05-05 ENCOUNTER — OFFICE VISIT (OUTPATIENT)
Dept: PULMONOLOGY | Age: 67
End: 2022-05-05
Payer: MEDICARE

## 2022-05-05 VITALS
BODY MASS INDEX: 34.69 KG/M2 | SYSTOLIC BLOOD PRESSURE: 154 MMHG | HEART RATE: 114 BPM | WEIGHT: 234.2 LBS | TEMPERATURE: 97.8 F | DIASTOLIC BLOOD PRESSURE: 84 MMHG | OXYGEN SATURATION: 96 % | HEIGHT: 69 IN

## 2022-05-05 DIAGNOSIS — Z09 HOSPITAL DISCHARGE FOLLOW-UP: ICD-10-CM

## 2022-05-05 DIAGNOSIS — J44.9 STAGE 3 SEVERE COPD BY GOLD CLASSIFICATION (HCC): ICD-10-CM

## 2022-05-05 DIAGNOSIS — Z87.891 PERSONAL HISTORY OF TOBACCO USE: ICD-10-CM

## 2022-05-05 PROCEDURE — 99214 OFFICE O/P EST MOD 30 MIN: CPT | Performed by: INTERNAL MEDICINE

## 2022-05-05 PROCEDURE — 3017F COLORECTAL CA SCREEN DOC REV: CPT | Performed by: INTERNAL MEDICINE

## 2022-05-05 PROCEDURE — 1111F DSCHRG MED/CURRENT MED MERGE: CPT | Performed by: INTERNAL MEDICINE

## 2022-05-05 PROCEDURE — 3023F SPIROM DOC REV: CPT | Performed by: INTERNAL MEDICINE

## 2022-05-05 PROCEDURE — G0296 VISIT TO DETERM LDCT ELIG: HCPCS | Performed by: INTERNAL MEDICINE

## 2022-05-05 PROCEDURE — G8417 CALC BMI ABV UP PARAM F/U: HCPCS | Performed by: INTERNAL MEDICINE

## 2022-05-05 PROCEDURE — G8427 DOCREV CUR MEDS BY ELIG CLIN: HCPCS | Performed by: INTERNAL MEDICINE

## 2022-05-05 PROCEDURE — 4004F PT TOBACCO SCREEN RCVD TLK: CPT | Performed by: INTERNAL MEDICINE

## 2022-05-05 NOTE — PATIENT INSTRUCTIONS
Patient Education        Stopping Smoking: Care Instructions  Your Care Instructions     Cigarette smokers crave the nicotine in cigarettes. Giving it up is much harder than simply changing a habit. Your body has to stop craving the nicotine. It is hard to quit, but you can do it. There are many tools that people use to quitsmoking. You may find that combining tools works best for you. There are several steps to quitting. First you get ready to quit. Then you get support to help you. After that, you learn new skills and behaviors to become anonsmoker. For many people, a necessary step is getting and using medicine. Your doctor will help you set up the plan that best meets your needs. You may want to attend a smoking cessation program to help you quit smoking. When you choose a program, look for one that has proven success. Ask your doctor for ideas. You will greatly increase your chances of success if you take medicineas well as get counseling or join a cessation program.  Some of the changes you feel when you first quit tobacco are uncomfortable. Your body will miss the nicotine at first, and you may feel short-tempered and grumpy. You may have trouble sleeping or concentrating. Medicine can help you deal with these symptoms. You may struggle with changing your smoking habits and rituals. The last step is the tricky one: Be prepared for the smoking urge to continue for a time. This is a lot to deal with, but keep at it. You willfeel better. Follow-up care is a key part of your treatment and safety. Be sure to make and go to all appointments, and call your doctor if you are having problems. It's also a good idea to know your test results and keep alist of the medicines you take. How can you care for yourself at home?  Ask your family, friends, and coworkers for support. You have a better chance of quitting if you have help and support.    Join a support group, such as Nicotine Anonymous, for people who are trying to quit smoking.  Consider signing up for a smoking cessation program, such as the American Lung Association's Freedom from Smoking program.   Get text messaging support. Go to the website at www.smokefree. gov to sign up for the [facility] program.   Set a quit date. Pick your date carefully so that it is not right in the middle of a big deadline or stressful time. Once you quit, do not even take a puff. Get rid of all ashtrays and lighters after your last cigarette. Clean your house and your clothes so that they do not smell of smoke.  Learn how to be a nonsmoker. Think about ways you can avoid those things that make you reach for a cigarette. ? Avoid situations that put you at greatest risk for smoking. For some people, it is hard to have a drink with friends without smoking. For others, they might skip a coffee break with coworkers who smoke. ? Change your daily routine. Take a different route to work or eat a meal in a different place.  Cut down on stress. Calm yourself or release tension by doing an activity you enjoy, such as reading a book, taking a hot bath, or gardening.  Talk to your doctor or pharmacist about nicotine replacement therapy, which replaces the nicotine in your body. You still get nicotine but you do not use tobacco. Nicotine replacement products help you slowly reduce the amount of nicotine you need. These products come in several forms, many of them available over-the-counter:  ? Nicotine patches  ? Nicotine gum and lozenges  ? Nicotine inhaler   Ask your doctor about bupropion (Wellbutrin) or varenicline (Chantix), which are prescription medicines. They do not contain nicotine. They help you by reducing withdrawal symptoms, such as stress and anxiety.  Some people find hypnosis, acupuncture, and massage helpful for ending the smoking habit.  Eat a healthy diet and get regular exercise.  Having healthy habits will help your body move past its craving for nicotine.  Be prepared to keep trying. Most people are not successful the first few times they try to quit. Do not get mad at yourself if you smoke again. Make a list of things you learned and think about when you want to try again, such as next week, next month, or next year. Where can you learn more? Go to https://[employer]Controlpe[employer]iceweb.My[employer]OBE. org and sign in to your [employer] account. Enter H087 in the [facility] box to learn more about \"Stopping Smoking: Care Instructions. \"     If you do not have an account, please click on the \"Sign Up Now\" link. Current as of: October 28, 2021               Content Version: 13.2  © 2006-2022 [employer]. Care instructions adapted under license by [facility] ([facility]). If you have questions about a medical condition or this instruction, always ask your healthcare professional. [place] [address] any warranty or liability for your use of this information. What is lung cancer screening? Lung cancer screening is a way in which doctors check the lungs for early signs of cancer in people who have no symptoms of lung cancer. A low-dose CT scan uses much less radiation than a normal CT scan and shows a more detailed image of the lungs than a standard X-ray. The goal of lung cancer screening is to find cancer early, before it has a chance to grow, spread, or cause problems. One large study found that smokers who were screened with low-dose CT scans were less likely to die of lung cancer than those who were screened with standard X-ray. Below is a summary of the things you need to know regarding screening for lung cancer with low-dose computed tomography (LDCT). This is a screening program that involves routine annual screening with LDCT studies of the lung. The LDCTs are done using low-dose radiation that is not thought to increase your cancer risk.   If you have other serious medical conditions (other cancers, congestive heart failure) that limit your life expectancy to less than 10 years, you should not undergo lung cancer screening with LDCT. The chance is 20%-60% that the LDCT result will show abnormalities. This would require additional testing which could include repeat imaging or even invasive procedures. Most (about 95%) of \"abnormal\" LDCT results are false in the sense that no lung cancer is ultimately found. Additionally, some (about 10%) of the cancers found would not affect your life expectancy, even if undetected and untreated. If you are still smoking, the single most important thing that you can do to reduce your risk of dying of lung cancer is to quit. For this screening to be covered by Medicare and most other insurers, strict criteria must be met. If you do not meet these criteria, but still wish to undergo LDCT testing, you will be required to sign a waiver indicating your willingness to pay for the scan.

## 2022-05-05 NOTE — PROGRESS NOTES
Neck Circumference - 17.5    Mallampati -4     Lung Nodule Screening     [] Qualifies    [] Does not qualify   [] Declined    [] Completed

## 2022-05-05 NOTE — PROGRESS NOTES
[facility]      Patient - Angelina Cano   MRN -  [id number]   [facility] -    - 1955      Date of evaluation -  2022  Primary Care Physician - TANIA Pichardo - CNP       CC   COPD  HPI   History Obtained From: Patient and electronic medical record. I saw Becky Medina in the hospital for pre-op before a vascular procedure, 40 PY smoker, current every day smoker 1 ppd with no interest in quitting. Bedside spirometry c/w COPD    Comes with full PFTs c/w moderate COPD with air trapping    PSG  negative for TOÑO    Does not truat Covid-19 vaccine and declines, as well as pneumococcal and flu vaccines    Disabled      PMHx   Past Medical History      Diagnosis Date    CAD (coronary artery disease)     Chronic diastolic congestive heart failure ([facility] [address] 3/19/2018    Hyperlipidemia     Hypertension     MI (myocardial infarction) ([facility] 2013    Tobacco abuse       Past Surgical History        Procedure Laterality Date    CARDIAC [facility]TERIZATION  3/13    [facility]    CORONARY ANGIOPLASTY WITH STENT PLACEMENT  10-1-13     Meds    Current Medications       IV Drips/Infusions    Home Medications  Not in a hospital admission. Diet    No diet orders on file  Allergies    Patient has no known allergies.   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  He is currently on disability History of tobacco smoking:Yes  Amount of tobacco smokin.0 PPD. Years of tobacco smokin. Quit smoking: No.               Current smoker: Yes. Amount of current tobacco smokinPPD  . History of recreational or IV drug use in the past:NO     History of exposure to coal mines/coal dust: NO  History of exposure to foundry dust/welding: NO  History of exposure to quarry/silica/sandblasting: NO  History of exposure to asbestos/working with breaks/ships: NO  History of exposure to farm dust: NO  History of recent travel to long distances: NO  History of exposure to birds, pigeons, or chickens in the past:NO  Pet animals at home:No    History of pulmonary embolism in the past: No            History of DVT in the past:No      .          Vitals     height is 5' 9\" (1.753 m) and weight is 234 lb 3.2 oz (106.2 kg). His temperature is 97.8 °F (36.6 °C). His blood pressure is 154/84 (abnormal) and his pulse is 114. His oxygen saturation is 96%. Body mass index is 34.59 kg/m². I/O      No intake or output data in the 24 hours ending 22 1338  I/O last 3 completed shifts: In: 130 [P.O.:120; I.V.:10]  Out: 0    Patient Vitals for the past 96 hrs (Last 3 readings):   Weight   21 0400 211 lb 10.3 oz (96 kg)   21 0400 223 lb 8.7 oz (101.4 kg)   21 2144 228 lb 14.4 oz (103.8 kg)       Exam   Physical Exam   Constitutional: strong tobacco odor  Head: Normocephalic and atraumatic. Mouth/Throat: Oropharynx is clear and moist.  No oral thrush. Eyes: Conjunctivae are normal. Pupils are equal, round. No scleral icterus. Neck: Neck supple. No tracheal deviation present. Cardiovascular: S1 and S2 with no murmur. No peripheral edema  Pulmonary/Chest: Normal effort with bilateral air entry, clear breath sounds. No stridor. No respiratory distress. Patient exhibits no tenderness. Abdominal: Soft. Bowel sounds audible.  No distension or tenderness to palp. Musculoskeletal: Moves all extremities  Neurological: Patient is alert and oriented to person, place, and time. Skin: Noted blue hue to right foot    Labs  - Old records and notes have been reviewed in [facility]   ABG  Lab Results   Component Value Date    PH 7.41 2021    PO2 56 2021    PCO2 43 2021    HCO3 27 2021    O2SAT 89 2021     No results found for: IFIO2, MODE, SETTIDVOL, SETPEEP  CBC  No results for input(s): WBC, RBC, HGB, HCT, MCV, MCH, MCHC, RDW, PLT, MPV in the last 72 hours. BMP  No results for input(s): NA, K, CL, CO2, BUN, CREATININE, GLUCOSE, MG, PHOS, CALCIUM, IONCA, MG in the last 72 hours. ABG 2021  Results for Binta Guerrier (MRN [id number]) as of 2021 10:55   Ref. Range 2021 00:14   Source: Unknown R Brach   pH, Blood Gas Latest Ref Range: 7.35 - 7.45  7.41   PCO2 Latest Ref Range: 35 - 45 mmhg 43   pO2 Latest Ref Range: 71 - 104 mmhg 56 (L)   HCO3 Latest Ref Range: 23 - 28 mmol/l 27   Base Excess (Calculated) Latest Ref Range: -2.5 - 2.5 mmol/l 2.4   O2 Sat Latest Units: % 89   Omar Test Unknown N/A       PFTs               Sleep studies   PATIENT NAME: Siddhartha Ware           :  1955  MEDICAL RECORD NO. [id number]               ROOM:   ACCOUNT NO: [de-identified]                        DATE: 2013  PHYSICIAN: Fallon Keyes M.D. DIAGNOSTIC POLYSOMNOGRAM     REFERRING PHYSICIAN:  Dr. Claudio Pineda:  Snoring, excessive daytime somnolence, coronary artery disease. HISTORY OF PRESENT ILLNESS:  Mr. Pippa Presley is a 62-year-old gentleman,  patient of Dr. Prarish Maurice, with recent acute coronary syndrome with ischemic  cardiomyopathy, referred to the Sleep Lab to rule out obstructive sleep  apnea, for complaints of excessive daytime somnolence and snoring. Sleepiness scale score is 12. Weight is listed as 204 pounds. Height 68 inches. BMI 31.      METHODS:  The patient underwent digital polysomnography in compliance with  the standards and specifications from the AASM Manual including the  simultaneous recording of 3 EEG channels (F4-M1, C4-M1, and O2-M1 with back  up electrodes F3-M2, C3-M2, and O1-M2), 2 EOG channels (E1-M 2, and E2-M1),  EMG (chin, left and right leg), EKG, Nonin pulse oximetry with  less than 2  second averaging time, body position, airflow recorded by oral-nasal thermal  sensor and nasal air pressure transducer, plus respiratory effort recorded by  calibrated respiratory inductance plethmography (RIP), flow volume loop,  sound and video. Sleep staging and scoring followed the standard put forth  by the American Academy of Sleep Medicine and utilized the 4A obstructive  hypopnea event desaturation of 4 percent or greater. DETAILS OF THE STUDY:  Lights out at 2252 hours and lights on at 0525 hours. Time in bed 394 minutes. Total sleep time 291 minutes. Sleep efficiency 74  percent. Sleep onset delay at 57 minutes. Wake after sleep onset 45  minutes. Latency to REM zero, as there was no REM sleep recorded. SLEEP DISRUPTION EVENTS:  There were 142 arousals for an arousal index of 29. SLEEP STAGE STATISTICS:  Mr. Regulo Berger slept 93 minutes in N1 sleep or 32  percent of total sleep time, 183 minutes in N2 sleep or 63 percent of total  sleep time, 15 minutes in N3 sleep or 5 percent of total sleep time. There  was no REM sleep. EKG SUMMARY:  Mr. Regulo Berger remained in normal sinus rhythm throughout the  night, with an average heart rate of 73.5 beats per minute during wakefulness  and 69 beats per minute during sleep. There were no sustained arrhythmias or  AV blocks. LIMB MOVEMENT SUMMARY:  There were a total of 90 episodes of limb movements,  65 of them were isolated and 25 were PLMs, for a limb movement index of 18.6,  with a limb movement arousal index of 3.7. RESPIRATORY SUMMARY:  There was some loud snoring recorded.   There were zero  apneas, 3 hypopneas, for an AHI of 0.6. There were zero obstructive apneas,  zero central apneas and zero mixed apneas. The respiratory disturbance index  was normal at 3.9. Pulse oximetry recorded revealed a mean oxygen saturation  during wakefulness of 96 percent and 91 percent during sleep. The lowest  oxygen saturation recorded throughout this study is 88 percent. He did not  drop oxygen saturation below 88 percent. There were 225.5 minutes of supine  sleep recorded. IMPRESSION:  Negative study for sleep disordered breathing. Difficulty  initiating and maintaining sleep with disruptive sleep architecture, with no  REM sleep recorded, which could be conceivably due to first night effect. Mild increase in number of limb movements, with a normal limb movement  arousal index. RECOMMENDATIONS:  PAP therapies are not indicated or recommended. Consider  other reasons of somnolence, review medications and sleep hygiene. I have reviewed the raw data of the above sleep study epoch by epoch and  interpreted. Rishi Fagan M.D.        D: 09/03/2013 17:51                                   T: 09/04/2013 10:43  als     CC:  LUKE Recinos M.D.  [address]                  [address]  [address]                        [address]  [facility] [place], [address] [place], [address]      Cultures    COVID-19 test ( SARS-CoV-2) is Negative/None detected on: 11/2/21      EKG     Echocardiogram   Right Ventricle   The right ventricular size was normal with normal systolic function and   wall thickness. Conclusions      Summary   Technically difficult examination. Normal left ventricle size and systolic function. Ejection fraction was   estimated at 60 %. There were no regional left ventricular wall motion   abnormalities and wall thickness was within normal limits.    Doppler parameters were consistent with abnormal left ventricular   relaxation (grade 1 diastolic dysfunction). Signature      ----------------------------------------------------------------   Electronically signed by Meghna Jacob MD (Interpreting   physician) on 11/12/2018 at 05:25 PM      Radiology    CXR  Thu Nov 4, 2021    PROCEDURE: XR CHEST PORTABLE   No acute intrathoracic process. Assessment   -COPD GOLD 2  -Current daily smoker, not motivated to quit  -Declines vaccinations  -Obesity BMI 34  Plan   -Albuterol 2 puffs q 4h prn  -Anoro 1 inh daily  -Strongly advised to stop smoking  -Patient was counseled on tobacco cessation. Based upon patient's motivation to change his behavior, the following plan was agreed upon: patient is not ready to work toward tobacco cessation at this time. He was provided with a list of local tobacco cessation resources. Provider spent 5 minutes counseling patient. Electronically signed by   Genoveva Simmons MD on 5/5/2022 at 1:38 PM       Low Dose CT (LDCT) Lung Screening criteria met:     Age 50-77(Medicare) or 50-80 (USPST)   Pack year smoking >20   Still smoking or less than 15 year since quit   No sign or symptoms of lung cancer   > 11 months since last LDCT     Risks and benefits of lung cancer screening with LDCT scans discussed:    Significance of positive screen - False-positive LDCT results often occur. 95% of all positive results do not lead to a diagnosis of cancer. Usually further imaging can resolve most false-positive results; however, some patients may require invasive procedures. Over diagnosis risk - 10% to 12% of screen-detected lung cancer cases are over diagnosed--that is, the cancer would not have been detected in the patient's lifetime without the screening.     Need for follow up screens annually to continue lung cancer screening effectiveness     Risks associated with radiation from annual LDCT- Radiation exposure is about the same as for a mammogram, which is about 1/3 of the annual background radiation exposure from everyday life. Starting screening at age 54 is not likely to increase cancer risk from radiation exposure. Patients with comorbidities resulting in life expectancy of < 10 years, or that would preclude treatment of an abnormality identified on CT, should not be screened due to lack of benefit.     To obtain maximal benefit from this screening, smoking cessation and long-term abstinence from smoking is critical

## 2022-06-14 ENCOUNTER — OFFICE VISIT (OUTPATIENT)
Dept: CARDIOTHORACIC SURGERY | Age: 67
End: 2022-06-14
Payer: MEDICARE

## 2022-06-14 VITALS
DIASTOLIC BLOOD PRESSURE: 83 MMHG | SYSTOLIC BLOOD PRESSURE: 166 MMHG | WEIGHT: 188 LBS | HEART RATE: 78 BPM | HEIGHT: 69 IN | BODY MASS INDEX: 27.85 KG/M2

## 2022-06-14 DIAGNOSIS — I73.9 PAD (PERIPHERAL ARTERY DISEASE) (HCC): Primary | ICD-10-CM

## 2022-06-14 PROCEDURE — 4004F PT TOBACCO SCREEN RCVD TLK: CPT | Performed by: THORACIC SURGERY (CARDIOTHORACIC VASCULAR SURGERY)

## 2022-06-14 PROCEDURE — G8417 CALC BMI ABV UP PARAM F/U: HCPCS | Performed by: THORACIC SURGERY (CARDIOTHORACIC VASCULAR SURGERY)

## 2022-06-14 PROCEDURE — 99214 OFFICE O/P EST MOD 30 MIN: CPT | Performed by: THORACIC SURGERY (CARDIOTHORACIC VASCULAR SURGERY)

## 2022-06-14 PROCEDURE — G8427 DOCREV CUR MEDS BY ELIG CLIN: HCPCS | Performed by: THORACIC SURGERY (CARDIOTHORACIC VASCULAR SURGERY)

## 2022-06-14 PROCEDURE — 3017F COLORECTAL CA SCREEN DOC REV: CPT | Performed by: THORACIC SURGERY (CARDIOTHORACIC VASCULAR SURGERY)

## 2022-06-14 NOTE — PROGRESS NOTES
CT/CV Surgery Follow Up Office Visit      Patient's Name/Date of Birth: Patricia Payne / 1955 (54 y.o.)    PCP: TANIA Oakes - CNP    Date: June 14, 2022    We had the pleasure of seeing Patricia Payne in the office today, as you know this is a very pleasant 61y.o. year old male with a history of hypertension, hyperlipidemia, status post myocardial infarction, status post coronary artery angioplasty in 2013, poorly controlled diabetes mellitus, COPD, chronic tobacco abuse, and severe peripheral arterial disease. He returns to our cardiovascular surgery clinic for follow-up. He reports improved walking distance by half block since last follow-up 3 months ago. He reports bilateral lower extremity claudication at 1 and half block of walking now. He denied any rest pain. But he continues to smoke. He is on Xarelto 2.5 twice daily, Plavix 75 mg daily, and aspirin 81 mg daily. PastMedical History:  Elton Keller  has a past medical history of CAD (coronary artery disease), Chronic diastolic congestive heart failure ([address] Hyperlipidemia, Hypertension, MI (myocardial infarction) ([facility] [address] and Tobacco abuse. Past Surgical History:  The patient  has a past surgical history that includes Cardiac catheterization (3/13) and Coronary angioplasty with stent (10-1-13). Allergies: The patient has No Known Allergies.     Medications:    Current Outpatient Medications:     lisinopril (PRINIVIL;ZESTRIL) 5 MG tablet, take 1 tablet by mouth twice a day, Disp: 30 tablet, Rfl: 5    atorvastatin (LIPITOR) 20 MG tablet, Take 1 tablet by mouth daily, Disp: 30 tablet, Rfl: 3    clopidogrel (PLAVIX) 75 MG tablet, Take 1 tablet by mouth daily, Disp: 30 tablet, Rfl: 3    furosemide (LASIX) 40 MG tablet, Take 1 tablet by mouth daily, Disp: 30 tablet, Rfl: 3    rivaroxaban (XARELTO) 2.5 MG TABS tablet, Take 1 tablet by mouth 2 times daily, Disp: 180 tablet, Rfl: 3    carvedilol (COREG) 12.5 MG tablet, Take 1 tablet by mouth 2 times daily (with meals), Disp: 60 tablet, Rfl: 3    glipiZIDE (GLUCOTROL) 10 MG tablet, Take 1 tablet by mouth every morning (before breakfast), Disp: 60 tablet, Rfl: 3    umeclidinium-vilanterol (ANORO ELLIPTA) 62.5-25 MCG/INH AEPB inhaler, Inhale 1 puff into the lungs daily, Disp: 1 each, Rfl: 11    albuterol sulfate HFA (VENTOLIN HFA) 108 (90 Base) MCG/ACT inhaler, Inhale 2 puffs into the lungs every 6 hours as needed for Wheezing or Shortness of Breath, Disp: 1 each, Rfl: 11    aspirin 81 MG chewable tablet, Take 1 tablet by mouth daily Indications: Cardiovascular Risk Reduction, blood thinner, Disp: 30 tablet, Rfl: 1    nicotine (NICODERM CQ) 21 MG/24HR, Place 1 patch onto the skin daily (Patient not taking: Reported on 5/5/2022), Disp: 42 patch, Rfl: 3    cilostazol (PLETAL) 50 MG tablet, Take 1 tablet by mouth daily for 3 days, Disp: 3 tablet, Rfl: 0    cilostazol (PLETAL) 50 MG tablet, Take 1 tablet by mouth 2 times daily for 4 days, Disp: 8 tablet, Rfl: 0    cilostazol (PLETAL) 100 MG tablet, Take 1 tablet by mouth 2 times daily, Disp: 60 tablet, Rfl: 3    Family History: This patient's family history includes Diabetes in his mother; Heart Disease in his father; High Blood Pressure in his brother; Stroke in his mother. Social History:  [facility]  reports that he has been smoking cigarettes. He has a 60.00 pack-year smoking history. He has never used smokeless tobacco. He reports current alcohol use. He reports that he does not use drugs. Vital Signs:   BP (!) 166/83 (Site: Right Upper Arm, Position: Sitting, Cuff Size: Medium Adult)   Pulse 78   Ht 5' 9\" (1.753 m)   Wt 188 lb (85.3 kg)   BMI 27.76 kg/m²     ROS:   Constitutional: Negative for activity change, chills, fatigue, fever and unexpected weight change. Respiratory: COPD. Cardiac: Remote PTCA. Vascular: Known PAD. Gastrointestinal: Negative for hematochezia, melana, constipation, and N/V/D.   Musculoskeletal: Negative for myalgias, amputation. Skin: Negative for color change, rash and wound. Neurological: Negative for dizziness or syncope. Nephrology: Negative for chronic kidney disease,     Physical Exam:  General appearance:  No acute distress, appears stated age and cooperative. Neck: No jugular venous distention. Trachea midline. No carotid bruits. Chest Wall: No deformity, midsternal scar, enlarged palpable supraclavicular lymphnode. Respiratory:  Normal respiratory effort. Clear to auscultation, bilaterally without Rales/Wheezes/Rhonch. Cardiovascular:  Regular rate and rhythm with normal S1/S2 without murmurs, rubs or gallops. Abdomen: Soft, non-tender, non-distended with normal bowel sounds. Ext: Chronic PAD. No ulcers. Skin: Skin color, texture, turgor normal.  No rashes or lesions. No rubor. No venous stasis pigmentation. No rubor. Neurologic:  Neurovascularly intact without any focal sensory/motor deficits. Peripheral Pulses: +1 palpable femoral pulses bilaterally. No palpable pedal pulses. Hand-held Doppler examination: Monophasic Doppler signal of right DP and PT bilaterally. Labs:    CBC:  Lab Results   Component Value Date    WBC 17.6 11/06/2021    HGB 13.9 11/06/2021    HCT 40.5 11/06/2021    MCV 97.1 11/06/2021     11/06/2021    INR 1.06 04/01/2013     BMP:   Lab Results   Component Value Date     11/03/2021    K 4.1 11/03/2021    CL 98 11/03/2021    CO2 26 11/03/2021    BUN 10 11/03/2021    CREATININE 0.6 11/03/2021    MG 2.0 06/07/2019         Problem List:  Patient Active Problem List   Diagnosis    NSTEMI (non-ST elevated myocardial infarction) (HCC)    Hyperglycemia    Smoker    Alcohol abuse    SOB (shortness of breath)    Sleep apnea, obstructive    Fatigue    Daytime somnolence    Depression    Heart disease    Obesity (BMI 30.0-34. 9)    Presence of stent in LAD coronary artery stent    Cardiomyopathy ([facility] [address]    Essential hypertension    Coronary artery disease involving native coronary artery of native heart without angina pectoris    Pure hypercholesterolemia    Chronic diastolic congestive heart failure (HCC)    SOB (shortness of breath) on exertion    Ischemia of right lower extremity    Peripheral artery disease (HCC)       Assessment: Severe PAD with 1 and half block claudication. Plan 6/14/22:  1) stop smoking. I had a long discussion with the patient regarding the importance of cessation of smoking. 2) follow-up in 3 months. 3) continue current medications. Thank you for allowing us to be involved in the patient's care.     Electronically by Radha Hurtado MD  on 6/14/2022 at 2:06 PM

## 2022-08-10 ENCOUNTER — HOSPITAL ENCOUNTER (OUTPATIENT)
Dept: CT IMAGING | Age: 67
Discharge: HOME | End: 2022-08-10
Payer: MEDICARE

## 2022-08-10 DIAGNOSIS — Z87.891 PERSONAL HISTORY OF TOBACCO USE: ICD-10-CM

## 2022-08-10 PROCEDURE — 71271 CT THORAX LUNG CANCER SCR C-: CPT

## 2022-08-26 ENCOUNTER — OFFICE VISIT (OUTPATIENT)
Dept: CARDIOLOGY CLINIC | Age: 67
End: 2022-08-26
Payer: MEDICARE

## 2022-08-26 VITALS
HEIGHT: 68 IN | DIASTOLIC BLOOD PRESSURE: 78 MMHG | BODY MASS INDEX: 34.46 KG/M2 | SYSTOLIC BLOOD PRESSURE: 135 MMHG | WEIGHT: 227.4 LBS | HEART RATE: 93 BPM

## 2022-08-26 DIAGNOSIS — I73.9 PERIPHERAL ARTERY DISEASE (HCC): ICD-10-CM

## 2022-08-26 DIAGNOSIS — I25.5 ISCHEMIC CARDIOMYOPATHY: ICD-10-CM

## 2022-08-26 DIAGNOSIS — E78.00 PURE HYPERCHOLESTEROLEMIA: ICD-10-CM

## 2022-08-26 DIAGNOSIS — I10 ESSENTIAL HYPERTENSION: ICD-10-CM

## 2022-08-26 DIAGNOSIS — I50.32 CHRONIC DIASTOLIC CONGESTIVE HEART FAILURE (HCC): ICD-10-CM

## 2022-08-26 DIAGNOSIS — Z72.0 TOBACCO ABUSE: ICD-10-CM

## 2022-08-26 DIAGNOSIS — I25.10 CORONARY ARTERY DISEASE INVOLVING NATIVE CORONARY ARTERY OF NATIVE HEART WITHOUT ANGINA PECTORIS: Primary | ICD-10-CM

## 2022-08-26 DIAGNOSIS — Z95.5 PRESENCE OF STENT IN LAD CORONARY ARTERY: ICD-10-CM

## 2022-08-26 PROCEDURE — 4004F PT TOBACCO SCREEN RCVD TLK: CPT | Performed by: INTERNAL MEDICINE

## 2022-08-26 PROCEDURE — G8427 DOCREV CUR MEDS BY ELIG CLIN: HCPCS | Performed by: INTERNAL MEDICINE

## 2022-08-26 PROCEDURE — 99215 OFFICE O/P EST HI 40 MIN: CPT | Performed by: INTERNAL MEDICINE

## 2022-08-26 PROCEDURE — 3017F COLORECTAL CA SCREEN DOC REV: CPT | Performed by: INTERNAL MEDICINE

## 2022-08-26 PROCEDURE — 1123F ACP DISCUSS/DSCN MKR DOCD: CPT | Performed by: INTERNAL MEDICINE

## 2022-08-26 PROCEDURE — G8417 CALC BMI ABV UP PARAM F/U: HCPCS | Performed by: INTERNAL MEDICINE

## 2022-08-26 RX ORDER — CILOSTAZOL 50 MG/1
TABLET ORAL
Qty: 8 TABLET | Refills: 0 | Status: SHIPPED | OUTPATIENT
Start: 2022-08-26 | End: 2022-08-30

## 2022-08-26 NOTE — PROGRESS NOTES
Chief Complaint   Patient presents with    Follow-up     6 mth fu (rs from 5/31)   Former [facility]  patient - 6 month follow up CAD with hx of stent      Last seen Nov 2018 but seen by NP IN BETWEEN    EKG was done today: 11/02/2021    Sob on exertion chronic - stable    Denied cp, dizziness, edema or palpitations    Reviewed the records      Patient Active Problem List   Diagnosis    NSTEMI (non-ST elevated myocardial infarction) ([address]    Hyperglycemia    Smoker    Alcohol abuse    SOB (shortness of breath)    Sleep apnea, obstructive    Fatigue    Daytime somnolence    Depression    Heart disease    Obesity (BMI 30.0-34. 9)    Presence of stent in LAD coronary artery stent    Essential hypertension    Coronary artery disease involving native coronary artery of native heart without angina pectoris    Pure hypercholesterolemia    Chronic diastolic congestive heart failure (HCC)    SOB (shortness of breath) on exertion    Ischemia of right lower extremity    Peripheral artery disease (HCC)    Tobacco abuse    Ischemic cardiomyopathy EF improved from 30% 2013  to 55% now       Past Surgical History:   Procedure Laterality Date    CARDIAC CATHETERIZATION  3/13    [facility]    CORONARY ANGIOPLASTY WITH STENT PLACEMENT  10-1-13       No Known Allergies     Family History   Problem Relation Age of Onset    Diabetes Mother     Stroke Mother     Heart Disease Father     High Blood Pressure Brother         Social History     Socioeconomic History    Marital status: Single     Spouse name: Not on file    Number of children: Not on file    Years of education: Not on file    Highest education level: Not on file   Occupational History    Not on file   Tobacco Use    Smoking status: Every Day     Packs/day: 1.50     Years: 40.00     Pack years: 60.00     Types: Cigarettes    Smokeless tobacco: Never   Vaping Use    Vaping Use: Never used   Substance and Sexual Activity    Alcohol use: Yes     Comment: 12 pk per day/ has cut down     Drug use: No    Sexual activity: Not on file   Other Topics Concern    Not on file   Social History Narrative    Not on file     Social Determinants of Health     Financial Resource Strain: Not on file   Food Insecurity: Not on file   Transportation Needs: Not on file   Physical Activity: Not on file   Stress: Not on file   Social Connections: Not on file   Intimate Partner Violence: Not on file   Housing Stability: Not on file       Current Outpatient Medications   Medication Sig Dispense Refill    lisinopril (PRINIVIL;ZESTRIL) 5 MG tablet take 1 tablet by mouth twice a day 30 tablet 5    atorvastatin (LIPITOR) 20 MG tablet Take 1 tablet by mouth daily 30 tablet 3    clopidogrel (PLAVIX) 75 MG tablet Take 1 tablet by mouth daily 30 tablet 3    furosemide (LASIX) 40 MG tablet Take 1 tablet by mouth daily 30 tablet 3    rivaroxaban (XARELTO) 2.5 MG TABS tablet Take 1 tablet by mouth 2 times daily 180 tablet 3    carvedilol (COREG) 12.5 MG tablet Take 1 tablet by mouth 2 times daily (with meals) 60 tablet 3    glipiZIDE (GLUCOTROL) 10 MG tablet Take 1 tablet by mouth every morning (before breakfast) 60 tablet 3    umeclidinium-vilanterol (ANORO ELLIPTA) 62.5-25 MCG/INH AEPB inhaler Inhale 1 puff into the lungs daily 1 each 11    albuterol sulfate HFA (VENTOLIN HFA) 108 (90 Base) MCG/ACT inhaler Inhale 2 puffs into the lungs every 6 hours as needed for Wheezing or Shortness of Breath 1 each 11    aspirin 81 MG chewable tablet Take 1 tablet by mouth daily Indications: Cardiovascular Risk Reduction, blood thinner 30 tablet 1    cilostazol (PLETAL) 50 MG tablet take 1 tablet by mouth twice a day for 4 days 8 tablet 0    nicotine (NICODERM CQ) 21 MG/24HR Place 1 patch onto the skin daily (Patient not taking: Reported on 5/5/2022) 42 patch 3    cilostazol (PLETAL) 100 MG tablet Take 1 tablet by mouth 2 times daily 60 tablet 3     No current facility-administered medications for this visit.        Review of Systems -     General ROS: negative  Psychological ROS: negative  Hematological and Lymphatic ROS: No history of blood clots or bleeding disorder. Respiratory ROS: no cough, shortness of breath, or wheezing  Cardiovascular ROS: no chest pain or dyspnea on exertion  Gastrointestinal ROS: negative  Genito-Urinary ROS: no dysuria, trouble voiding, or hematuria  Musculoskeletal ROS: negative  Neurological ROS: no TIA or stroke symptoms  Dermatological ROS: negative      Blood pressure 135/78, pulse 93, height 5' 8\" (1.727 m), weight 227 lb 6.4 oz (103.1 kg). Physical Examination:    General appearance - alert, well appearing, and in no distress  Mental status - alert, oriented to person, place, and time  Neck - supple, no significant adenopathy, no JVD, or carotid bruits  Chest - clear to auscultation, no wheezes, rales or rhonchi, symmetric air entry  Heart - normal rate, regular rhythm, normal S1, S2, no murmurs, rubs, clicks or gallops  Abdomen - soft, nontender, nondistended, no masses or organomegaly  Neurological - alert, oriented, normal speech, no focal findings or movement disorder noted  Musculoskeletal - no joint tenderness, deformity or swelling  Extremities - peripheral pulses normal, no pedal edema, no clubbing or cyanosis  Skin - normal coloration and turgor, no rashes, no suspicious skin lesions noted    Lab  No results for input(s): CKTOTAL, CKMB, CKMBINDEX, TROPONINI in the last 72 hours.   CBC:   Lab Results   Component Value Date/Time    WBC 17.6 11/06/2021 04:09 AM    RBC 4.17 11/06/2021 04:09 AM    HGB 13.9 11/06/2021 04:09 AM    HCT 40.5 11/06/2021 04:09 AM    MCV 97.1 11/06/2021 04:09 AM    MCH 33.3 11/06/2021 04:09 AM    MCHC 34.3 11/06/2021 04:09 AM    RDW 12.1 10/01/2013 03:39 PM     11/06/2021 04:09 AM    MPV 11.1 11/06/2021 04:09 AM     BMP:    Lab Results   Component Value Date/Time     11/03/2021 06:56 AM    K 4.1 11/03/2021 06:56 AM    CL 98 11/03/2021 06:56 AM    CO2 26 11/03/2021 06:56 Goldy Hodge, Interventional  Cardiologist who felt that the right coronary artery and circumflex were not  well suited for percutaneous intervention due to tortuosity. It was  recommended to obtain Cardiosurgical consultation regarding possible bypass  surgery. This also with a view towards avoiding dual antiplatelet therapy  with potential stenting due to potential issues with compliance with the  patient or lack of financial resources. The patient will be transferred to  3B Telemetry floor. We will discontinue full dose Lovenox and Integrilin  drip and await surgical input and then start the patient likely on Plavix in  addition to a statin, ACE inhibitor, aspirin and beta blocker. This was  discussed with the patient. No family was available. Bright Navarro M.D.        D: 04/01/2013 11:54                                    T: 04/01/2013 12:39  ks        CONCLUSION:  The patient had significant coronary disease including right  coronary artery which was totally occluded. The mid LAD was around 79  percent disease and the circumflex artery also was around 70 percent disease. The patient's stress test did not show any ischemia. It could be because  of the balanced ischemia. Inferior wall is nonviable, so there is no need to  intervene on the right coronary artery. The patient has been having ischemic  symptoms despite being on the medications. We decided to intervene on the  LAD today. It was stented successfully with 3.0x18 mm drug eluting stent  post dilated with 3.5 mm proximally. The patient tolerated the procedure  very well. We will discharge him home if the patient stays stable. Continue  optimal medical management. If the patient continues to have some symptoms,  we will intervene on the circumflex artery in the future. Further care as  per Dr. Call Or. Gustavo Sparrow M.D.        D: 10/01/2013 20:58                                   T: 10/01/2013 22:29  [facility] CC:  Hemraj R. Neil Romberg, M.D. [place]:     Left ventricle:   endocardium not well seen. Size was normal.   Systolic function was moderately to markedly reduced. Ejection fraction was   estimated in the range of 25 % to 30 %. This study was inadequate for the evaluation of regional wall motion. The basal posterior wall was hypokinetic. The mid posterior wall was hypokinetic. Doppler parameters were consistent with abnormal left ventricular relaxation   (grade 1 diastolic dysfunction). Right ventricle: The ventricle was mildly to moderately dilated. Prepared and signed by     Jany Kwok MD   Signed 30-Mar-2013 17:23:22        SUMMARY:     Left ventricle:   limited echo for EF. LV endocardium not well seen. Size was normal.   Systolic function was mildly reduced. Ejection fraction was estimated in the   range of 45 % to 50 %. Although no diagnostic regional wall motion abnormality   was identified, this possibility cannot be completely excluded on the basis of   this study. Prepared and signed by     Jany Kwok MD   Signed 09-Aug-2013 15:17:30      Conclusions      Summary   Lexiscan EKG stress test is not suggestive for ischemia. The nuclear images is not suggestive for myocardial ischemia. Signatures      ----------------------------------------------------------------   Electronically signed by Steve Byrd MD (Interpreting   Cardiologist) on 11/05/2021 at 19:04   ----------------------------------------------------------------        Conclusions      Summary   Technically difficult examination. Normal left ventricle size and systolic function. Ejection fraction was   estimated at 55 %. There were no regional left ventricular wall motion   abnormalities and wall thickness was within normal limits.       Signature      ----------------------------------------------------------------   Electronically signed by Steve Byrd MD (Interpreting physician) on 11/04/2021 at 08:04 PM   ----------------------------------------------------------------      ekg 11/1/18  Sinus  Rhythm   -Right bundle branch block with left axis -bifascicular block. ABNORMAL     Assessment   Diagnosis Orders   1. Coronary artery disease involving native coronary artery of native heart without angina pectoris  Basic Metabolic Panel    Magnesium    Lipid Panel    Hepatic Function Panel      2. Chronic diastolic congestive heart failure (HCC)  Basic Metabolic Panel    Magnesium    Lipid Panel    Hepatic Function Panel      3. Essential hypertension  Basic Metabolic Panel    Magnesium    Lipid Panel    Hepatic Function Panel      4. Pure hypercholesterolemia  Basic Metabolic Panel    Magnesium    Lipid Panel    Hepatic Function Panel      5. Ischemic cardiomyopathy EF improved from 30% 2013  to 55% now  Basic Metabolic Panel    Magnesium    Lipid Panel    Hepatic Function Panel      6. Presence of stent in LAD coronary artery stent  Basic Metabolic Panel    Magnesium    Lipid Panel    Hepatic Function Panel      7. Peripheral artery disease (HCC)  Basic Metabolic Panel    Magnesium    Lipid Panel    Hepatic Function Panel      8. Tobacco abuse  Basic Metabolic Panel    Magnesium    Lipid Panel    Hepatic Function Panel          admission DX nov 2021     Preoperative risk assessment for vascular surgery. 2.  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 No active pain at rest.  3.  Coronary artery disease, status post LAD stent in 2013 and medical  therapy for circumflex and RCA was 100% occluded, _____ collateral from  the left to the right. 4.  History of ischemic cardiomyopathy in 2013, improved, later  normalized EF.   5.  History of congestive heart failure and has been off diuretics for  over a year by himself and no evidence of congestion at this level. 6.  COPD with wheezes. 7.  History of tobacco abuse. 8.  Hypertension. 9.  Hyperlipidemia. 10.  Noncompliance with followup and medications totally. Plan   The  current meds and labs reviewed    Patient Seen, Chart, Consults notes, Labs, Radiology studies reviewed. Continue the current treatment and with constant vigilance to changes in symptoms and also any potential side effects. Return for care or seek medical attention immediately if symptoms got worse and/or develop new symptoms. Coronary artery disease, seems to be stable. Denies angina or change in breathing pattern  S/p angioplasty    Hypertension, on medical treatment. Seems to be under good control. Patient is compliant with medical treatment. Hyperlipidemia: on statins, followed periodically. Patient need periodic lipid and liver profile. Lipid panel and liver function test before next appointment    Congestive heart failure: no evidence of fluid overload today, no recent hospitalization for CHF  On lasix 40  Not on kcl  Need lab done asap    Cardiomyopathy: improving, no CHF symptoms, no change in clinical condition. Will need periodic echocardiograms depending on symptoms. Improved to Ef 50% from 30%  omt    Re-advised Needs lab     D/w the pat the plan of care    PAD under f/u with vascular surgeon on med RX  Intermittent caludication better with meds    Discussed use, benefit, and side effects of prescribed medications. All patient questions answered. Pt voiced understanding. Instructed to continue current medications, diet and exercise. Continue risk factor modification and medical management. Patient agreed with treatment plan. Follow up as directed.      I spent 40 minutes involved in face-to-face discussion of medical issues, prognosis, record review  and plan with the patient today and more than 50% of the time was spent on counseling and coordination of care      RTC in 6 months      Deana Justin, [facility]

## 2022-09-20 ENCOUNTER — HOSPITAL ENCOUNTER (OUTPATIENT)
Age: 67
Setting detail: SPECIMEN
Discharge: HOME | End: 2022-09-20

## 2022-09-20 DIAGNOSIS — I73.9 PERIPHERAL ARTERY DISEASE (HCC): ICD-10-CM

## 2022-09-20 DIAGNOSIS — I50.32 CHRONIC DIASTOLIC CONGESTIVE HEART FAILURE (HCC): ICD-10-CM

## 2022-09-20 DIAGNOSIS — I25.10 CORONARY ARTERY DISEASE INVOLVING NATIVE CORONARY ARTERY OF NATIVE HEART WITHOUT ANGINA PECTORIS: ICD-10-CM

## 2022-09-20 DIAGNOSIS — I10 ESSENTIAL HYPERTENSION: ICD-10-CM

## 2022-09-20 DIAGNOSIS — Z72.0 TOBACCO ABUSE: ICD-10-CM

## 2022-09-20 DIAGNOSIS — I25.5 ISCHEMIC CARDIOMYOPATHY: ICD-10-CM

## 2022-09-20 DIAGNOSIS — Z95.5 PRESENCE OF STENT IN LAD CORONARY ARTERY: ICD-10-CM

## 2022-09-20 DIAGNOSIS — E78.00 PURE HYPERCHOLESTEROLEMIA: ICD-10-CM

## 2022-09-20 LAB
ALBUMIN SERPL-MCNC: 4.1 G/DL (ref 3.5–5.2)
ALBUMIN/GLOBULIN RATIO: 1.3 (ref 1–2.5)
ALP BLD-CCNC: 95 U/L (ref 40–129)
ALT SERPL-CCNC: 31 U/L (ref 5–41)
ANION GAP SERPL CALCULATED.3IONS-SCNC: 13 MMOL/L (ref 9–17)
AST SERPL-CCNC: 20 U/L
BILIRUB SERPL-MCNC: 0.3 MG/DL (ref 0.3–1.2)
BILIRUBIN DIRECT: <0.1 MG/DL
BILIRUBIN, INDIRECT: NORMAL MG/DL (ref 0–1)
BUN BLDV-MCNC: 12 MG/DL (ref 8–23)
CALCIUM SERPL-MCNC: 9.1 MG/DL (ref 8.6–10.4)
CHLORIDE BLD-SCNC: 98 MMOL/L (ref 98–107)
CHOLESTEROL/HDL RATIO: 2.5
CHOLESTEROL: 119 MG/DL
CO2: 26 MMOL/L (ref 20–31)
CREAT SERPL-MCNC: 0.82 MG/DL (ref 0.7–1.2)
GFR AFRICAN AMERICAN: >60 ML/MIN
GFR NON-AFRICAN AMERICAN: >60 ML/MIN
GFR SERPL CREATININE-BSD FRML MDRD: ABNORMAL ML/MIN/{1.73_M2}
GLUCOSE BLD-MCNC: 237 MG/DL (ref 70–99)
HDLC SERPL-MCNC: 48 MG/DL
LDL CHOLESTEROL: 60 MG/DL (ref 0–130)
MAGNESIUM: 1.9 MG/DL (ref 1.6–2.6)
POTASSIUM SERPL-SCNC: 4.6 MMOL/L (ref 3.7–5.3)
SODIUM BLD-SCNC: 137 MMOL/L (ref 135–144)
TOTAL PROTEIN: 7.2 G/DL (ref 6.4–8.3)
TRIGL SERPL-MCNC: 57 MG/DL

## 2022-09-22 ENCOUNTER — TELEPHONE (OUTPATIENT)
Dept: CARDIOLOGY CLINIC | Age: 67
End: 2022-09-22

## 2022-09-26 NOTE — TELEPHONE ENCOUNTER
Lab reviewed  - all good But  random Glucose elevated to 237- please send to pcp to be followed and addressed

## 2022-09-26 NOTE — TELEPHONE ENCOUNTER
Spoke to patient. He states he doesn't have a PCP. Asked pt if he needed a recommendation for one. He states he used to go to the clinic on [address].  He states he will see about going back there, states he can \"just walk in.\"

## 2022-09-27 ENCOUNTER — TELEPHONE (OUTPATIENT)
Dept: PULMONOLOGY | Age: 67
End: 2022-09-27

## 2022-09-27 ENCOUNTER — OFFICE VISIT (OUTPATIENT)
Dept: CARDIOTHORACIC SURGERY | Age: 67
End: 2022-09-27
Payer: MEDICARE

## 2022-09-27 VITALS
HEART RATE: 83 BPM | WEIGHT: 226 LBS | HEIGHT: 68 IN | SYSTOLIC BLOOD PRESSURE: 136 MMHG | DIASTOLIC BLOOD PRESSURE: 83 MMHG | BODY MASS INDEX: 34.25 KG/M2

## 2022-09-27 DIAGNOSIS — I73.9 PAD (PERIPHERAL ARTERY DISEASE) (HCC): Primary | ICD-10-CM

## 2022-09-27 PROCEDURE — 1123F ACP DISCUSS/DSCN MKR DOCD: CPT | Performed by: THORACIC SURGERY (CARDIOTHORACIC VASCULAR SURGERY)

## 2022-09-27 PROCEDURE — G8427 DOCREV CUR MEDS BY ELIG CLIN: HCPCS | Performed by: THORACIC SURGERY (CARDIOTHORACIC VASCULAR SURGERY)

## 2022-09-27 PROCEDURE — 99215 OFFICE O/P EST HI 40 MIN: CPT | Performed by: THORACIC SURGERY (CARDIOTHORACIC VASCULAR SURGERY)

## 2022-09-27 PROCEDURE — 3017F COLORECTAL CA SCREEN DOC REV: CPT | Performed by: THORACIC SURGERY (CARDIOTHORACIC VASCULAR SURGERY)

## 2022-09-27 PROCEDURE — 4004F PT TOBACCO SCREEN RCVD TLK: CPT | Performed by: THORACIC SURGERY (CARDIOTHORACIC VASCULAR SURGERY)

## 2022-09-27 PROCEDURE — G8417 CALC BMI ABV UP PARAM F/U: HCPCS | Performed by: THORACIC SURGERY (CARDIOTHORACIC VASCULAR SURGERY)

## 2022-09-27 RX ORDER — CILOSTAZOL 100 MG/1
100 TABLET ORAL 2 TIMES DAILY
Qty: 60 TABLET | Refills: 3 | Status: SHIPPED | OUTPATIENT
Start: 2022-09-27

## 2022-09-27 NOTE — TELEPHONE ENCOUNTER
[facility] for pt[facility]val On 9/29 pick-up at 12:45pm for a 1:20 appt with brigid. Confirmed with ptYuval Huitron 9/29 ride and his ride today [facility]/27 with another [facility]

## 2022-09-27 NOTE — PATIENT INSTRUCTIONS
If you receive a survey asking about your care experience, please respond. Your answers with will help ensure you receive high-quality care at this office. Thank you!

## 2022-09-27 NOTE — PROGRESS NOTES
CT/CV Surgery Follow Up Office Visit      Patient's Name/Date of Birth: Blayne Go / 1955 (56 y.o.)    PCP: None Provider    Date: September 27, 2022    We had the pleasure of seeing Blayne Go in the office today, as you know this is a very pleasant 74y.o. year old male with a history of hypertension, hyperlipidemia, status post myocardial infarction, coronary artery angioplasty in 2013, poorly controlled DM2, COPD, chronic tobacco abuse, and severe peripheral arterial disease. He returns to our cardiovascular surgery clinic for follow-up. He reports much improved walking distance up to 2-3 blocks now. He denies any rest pain at night. He admits that he still smokes 1 pack/day. He has been on Xarelto 2.5 twice daily, Plavix 75 daily and aspirin 81 mg daily. PastMedical History:  Kayla Hanna  has a past medical history of CAD (coronary artery disease), Chronic diastolic congestive heart failure ([facility] [address] Hyperlipidemia, Hypertension, MI (myocardial infarction) ([facility] [address] and Tobacco abuse. Past Surgical History:  The patient  has a past surgical history that includes Cardiac catheterization (3/13) and Coronary angioplasty with stent (10-1-13). Allergies: The patient has No Known Allergies.     Medications:    Current Outpatient Medications:     cilostazol (PLETAL) 100 MG tablet, Take 1 tablet by mouth 2 times daily, Disp: 60 tablet, Rfl: 3    lisinopril (PRINIVIL;ZESTRIL) 5 MG tablet, take 1 tablet by mouth twice a day, Disp: 30 tablet, Rfl: 5    atorvastatin (LIPITOR) 20 MG tablet, Take 1 tablet by mouth daily, Disp: 30 tablet, Rfl: 3    clopidogrel (PLAVIX) 75 MG tablet, Take 1 tablet by mouth daily, Disp: 30 tablet, Rfl: 3    furosemide (LASIX) 40 MG tablet, Take 1 tablet by mouth daily, Disp: 30 tablet, Rfl: 3    rivaroxaban (XARELTO) 2.5 MG TABS tablet, Take 1 tablet by mouth 2 times daily, Disp: 180 tablet, Rfl: 3    carvedilol (COREG) 12.5 MG tablet, Take 1 tablet by mouth 2 times daily (with meals), Disp: 60 tablet, Rfl: 3    glipiZIDE (GLUCOTROL) 10 MG tablet, Take 1 tablet by mouth every morning (before breakfast), Disp: 60 tablet, Rfl: 3    umeclidinium-vilanterol (ANORO ELLIPTA) 62.5-25 MCG/INH AEPB inhaler, Inhale 1 puff into the lungs daily, Disp: 1 each, Rfl: 11    albuterol sulfate HFA (VENTOLIN HFA) 108 (90 Base) MCG/ACT inhaler, Inhale 2 puffs into the lungs every 6 hours as needed for Wheezing or Shortness of Breath, Disp: 1 each, Rfl: 11    aspirin 81 MG chewable tablet, Take 1 tablet by mouth daily Indications: Cardiovascular Risk Reduction, blood thinner, Disp: 30 tablet, Rfl: 1    cilostazol (PLETAL) 50 MG tablet, take 1 tablet by mouth twice a day for 4 days, Disp: 8 tablet, Rfl: 0    nicotine (NICODERM CQ) 21 MG/24HR, Place 1 patch onto the skin daily (Patient not taking: Reported on 5/5/2022), Disp: 42 patch, Rfl: 3    cilostazol (PLETAL) 100 MG tablet, Take 1 tablet by mouth 2 times daily, Disp: 60 tablet, Rfl: 3    Family History: This patient's family history includes Diabetes in his mother; Heart Disease in his father; High Blood Pressure in his brother; Stroke in his mother. Social History:  Savita Anguiano  reports that he has been smoking cigarettes. He has a 60.00 pack-year smoking history. He has never used smokeless tobacco. He reports current alcohol use. He reports that he does not use drugs. Vital Signs:   /83 (Site: Left Upper Arm, Position: Sitting, Cuff Size: Medium Adult)   Pulse 83   Ht 5' 8\" (1.727 m)   Wt 226 lb (102.5 kg)   BMI 34.36 kg/m²     ROS:   Constitutional: Negative for activity change, chills, fatigue, fever and unexpected weight change. Respiratory: COPD. Cardiac: Status post coronary stent. Vascular: 2-3 block claudication. Gastrointestinal: Negative for hematochezia, melana, constipation, and N/V/D. Musculoskeletal: Negative for myalgias, amputation. No ulcer. Skin: Negative for color change, rash and wound.    Neurological: Negative for dizziness or syncope. Nephrology: Negative for chronic kidney disease,     Physical Exam:  General appearance:  No acute distress, appears stated age and cooperative. Neck: No jugular venous distention. Trachea midline. No carotid bruits. Chest Wall: No deformity, midsternal scar, enlarged palpable supraclavicular lymphnode. Respiratory: Decreased breathing sound bilaterally. Cardiovascular:  Regular rate and rhythm with normal S1/S2 without murmurs, rubs or gallops. Abdomen: Soft, non-tender, non-distended with normal bowel sounds. Obese abdomen. Ext: No clubbing, cyanosis or edema bilaterally. Chronic ischemic changes. Skin: Skin color, texture, turgor normal.  No rashes or lesions. No rubor. No venous stasis pigmentation. Neurologic:  Neurovascularly intact without any focal sensory/motor deficits. Peripheral Pulses: +1 palpable right femoral pulse. Nonpalpable left femoral pulse. Nonpalpable pedal pulses bilaterally. Hand-held Doppler examination: No Doppler signal over DP bilaterally. Strong biphasic Doppler signal over right PT. Monophasic Doppler signal of left PTA. Labs:    CBC:  Lab Results   Component Value Date/Time    WBC 17.6 11/06/2021 04:09 AM    HGB 13.9 11/06/2021 04:09 AM    HCT 40.5 11/06/2021 04:09 AM    MCV 97.1 11/06/2021 04:09 AM     11/06/2021 04:09 AM    INR 1.06 04/01/2013 03:38 AM     BMP:   Lab Results   Component Value Date/Time     09/20/2022 12:34 PM    K 4.6 09/20/2022 12:34 PM    K 4.1 11/03/2021 06:56 AM    CL 98 09/20/2022 12:34 PM    CO2 26 09/20/2022 12:34 PM    BUN 12 09/20/2022 12:34 PM    CREATININE 0.82 09/20/2022 12:34 PM    MG 1.9 09/20/2022 12:34 PM       Imaging: I have reviewed previous CTA.       Problem List:  Patient Active Problem List   Diagnosis    NSTEMI (non-ST elevated myocardial infarction) (HCC)    Hyperglycemia    Smoker    Alcohol abuse    SOB (shortness of breath)    Sleep apnea, obstructive    Fatigue    Daytime somnolence    Depression    Heart disease    Obesity (BMI 30.0-34. 9)    Presence of stent in LAD coronary artery stent    Essential hypertension    Coronary artery disease involving native coronary artery of native heart without angina pectoris    Pure hypercholesterolemia    Chronic diastolic congestive heart failure (HCC)    SOB (shortness of breath) on exertion    Ischemia of right lower extremity    Peripheral artery disease ([address]    Tobacco abuse    Ischemic cardiomyopathy EF improved from 30% 2013  to 55% now       Assessment: Peripheral arterial disease with 2-3 block claudication. Improved with medical treatment. Continues smoking. Plan 9/27/22:  1) Smoking. I had another long discussion with the patient regarding the importance of cessation of smoking. 2) cilostazol 100 mg twice daily  3) follow-up in 3 months with arterial Doppler study with JOSE measurement. Thank you for allowing us to be involved in the patient's care.     Electronically by Peter Cloud MD  on 9/27/2022 at 3:06 PM

## 2022-09-27 NOTE — PROGRESS NOTES
3 month follow up for PAD  Reports doing the same  No major changes or concerns  States that he is not taking the cilostazol because prescription was not correct

## 2022-09-29 ENCOUNTER — OFFICE VISIT (OUTPATIENT)
Dept: PULMONOLOGY | Age: 67
End: 2022-09-29
Payer: MEDICARE

## 2022-09-29 VITALS
OXYGEN SATURATION: 97 % | HEART RATE: 92 BPM | HEIGHT: 68 IN | DIASTOLIC BLOOD PRESSURE: 72 MMHG | WEIGHT: 227 LBS | BODY MASS INDEX: 34.4 KG/M2 | TEMPERATURE: 97.4 F | SYSTOLIC BLOOD PRESSURE: 132 MMHG

## 2022-09-29 DIAGNOSIS — J44.9 CHRONIC OBSTRUCTIVE PULMONARY DISEASE, UNSPECIFIED COPD TYPE (HCC): ICD-10-CM

## 2022-09-29 DIAGNOSIS — R93.89 ABNORMAL SCREENING CT OF CHEST: Primary | ICD-10-CM

## 2022-09-29 DIAGNOSIS — F17.200 SMOKER UNMOTIVATED TO QUIT: ICD-10-CM

## 2022-09-29 PROCEDURE — 3017F COLORECTAL CA SCREEN DOC REV: CPT | Performed by: INTERNAL MEDICINE

## 2022-09-29 PROCEDURE — 1123F ACP DISCUSS/DSCN MKR DOCD: CPT | Performed by: INTERNAL MEDICINE

## 2022-09-29 PROCEDURE — 3023F SPIROM DOC REV: CPT | Performed by: INTERNAL MEDICINE

## 2022-09-29 PROCEDURE — G8417 CALC BMI ABV UP PARAM F/U: HCPCS | Performed by: INTERNAL MEDICINE

## 2022-09-29 PROCEDURE — 4004F PT TOBACCO SCREEN RCVD TLK: CPT | Performed by: INTERNAL MEDICINE

## 2022-09-29 PROCEDURE — 99213 OFFICE O/P EST LOW 20 MIN: CPT | Performed by: INTERNAL MEDICINE

## 2022-09-29 PROCEDURE — G8427 DOCREV CUR MEDS BY ELIG CLIN: HCPCS | Performed by: INTERNAL MEDICINE

## 2022-09-29 NOTE — PROGRESS NOTES
Neck Circumference -   17.5 inches  Mallampati - 4    Lung Nodule Screening     [x] Qualifies    [] Does not qualify   [] Declined    [] Completed

## 2022-09-29 NOTE — PROGRESS NOTES
[facility]      Patient - Tin Haddad   MRN -  [id number]   [facility]    - 1955      Date of evaluation -  2022  Primary Care Physician - None Provider       CC   COPD  HPI   History Obtained From: Patient and electronic medical record. Diagnosed with DM by Dr Tito Birch and referred to PCP but does not have one, tells me he will go to the 8th  clinic. On Anoro and albuterol with benefit  Continues smoking  RADS 3 screening CT chest, needs repeat in 6 mos      Previous notes    I saw Gisela Brian in the hospital for pre-op before a vascular procedure, 40 PY smoker, current every day smoker 1 ppd with no interest in quitting. Bedside spirometry c/w COPD    Comes with full PFTs c/w moderate COPD with air trapping    PSG  negative for TOÑO    Does not truat Covid-19 vaccine and declines, as well as pneumococcal and flu vaccines    Disabled      PMHx   Past Medical History      Diagnosis Date    CAD (coronary artery disease)     Chronic diastolic congestive heart failure ([facility] [address] 3/19/2018    Hyperlipidemia     Hypertension     MI (myocardial infarction) ([facility] [address] 2013    Tobacco abuse       Past Surgical History        Procedure Laterality Date    CARDIAC CATHETERIZATION  3/13    [facility]    CORONARY ANGIOPLASTY WITH STENT PLACEMENT  10-1-13     Meds    Current Medications       IV Drips/Infusions    Home Medications  Not in a hospital admission. Diet    No diet orders on file  Allergies    Patient has no known allergies.   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  He is currently on disability                          Vitals     height is 5' 8\" (1.727 m) and weight is 227 lb (103 kg). His temperature is 97.4 °F (36.3 °C). His blood pressure is 132/72 and his pulse is 92. His oxygen saturation is 97%. Body mass index is 34.52 kg/m². Exam   Physical Exam   Constitutional: strong tobacco odor  Head: Normocephalic and atraumatic. Mouth/Throat: Oropharynx is clear and moist.  No oral thrush. Eyes: Conjunctivae are normal. Pupils are equal, round. No scleral icterus. Neck: Neck supple. No tracheal deviation present. Cardiovascular: S1 and S2 with no murmur. No peripheral edema  Pulmonary/Chest: Normal effort with bilateral air entry, clear breath sounds. No stridor. No respiratory distress. Patient exhibits no tenderness. Abdominal: Soft. Bowel sounds audible. No distension or tenderness to palp. Musculoskeletal: Moves all extremities  Neurological: Patient is alert and oriented to person, place, and time. Skin: Noted blue hue to right foot      PFTs               Sleep studies   PATIENT NAME: Oc Ortega           :  1955  MEDICAL RECORD NO. [id number]               ROOM:   ACCOUNT NO: [de-identified]                        DATE: 2013  PHYSICIAN: Bear Villa M.D. DIAGNOSTIC POLYSOMNOGRAM     REFERRING PHYSICIAN:  Dr. Erna Rice:  Snoring, excessive daytime somnolence, coronary artery disease. HISTORY OF PRESENT ILLNESS:  Mr. Emeli Rod is a 29-year-old gentleman,  patient of Dr. Yan Dey, with recent acute coronary syndrome with ischemic  cardiomyopathy, referred to the Sleep Lab to rule out obstructive sleep  apnea, for complaints of excessive daytime somnolence and snoring. Sleepiness scale score is 12. Weight is listed as 204 pounds.   Height 68 inches. BMI 31. METHODS:  The patient underwent digital polysomnography in compliance with  the standards and specifications from the AASM Manual including the  simultaneous recording of 3 EEG channels (F4-M1, C4-M1, and O2-M1 with back  up electrodes F3-M2, C3-M2, and O1-M2), 2 EOG channels (E1-M 2, and E2-M1),  EMG (chin, left and right leg), EKG, Nonin pulse oximetry with  less than 2  second averaging time, body position, airflow recorded by oral-nasal thermal  sensor and nasal air pressure transducer, plus respiratory effort recorded by  calibrated respiratory inductance plethmography (RIP), flow volume loop,  sound and video. Sleep staging and scoring followed the standard put forth  by the American Academy of Sleep Medicine and utilized the 4A obstructive  hypopnea event desaturation of 4 percent or greater. DETAILS OF THE STUDY:  Lights out at 2252 hours and lights on at 0525 hours. Time in bed 394 minutes. Total sleep time 291 minutes. Sleep efficiency 74  percent. Sleep onset delay at 57 minutes. Wake after sleep onset 45  minutes. Latency to REM zero, as there was no REM sleep recorded. SLEEP DISRUPTION EVENTS:  There were 142 arousals for an arousal index of 29. SLEEP STAGE STATISTICS:  Mr. Teo Ramires slept 93 minutes in N1 sleep or 32  percent of total sleep time, 183 minutes in N2 sleep or 63 percent of total  sleep time, 15 minutes in N3 sleep or 5 percent of total sleep time. There  was no REM sleep. EKG SUMMARY:  Mr. Teo Ramires remained in normal sinus rhythm throughout the  night, with an average heart rate of 73.5 beats per minute during wakefulness  and 69 beats per minute during sleep. There were no sustained arrhythmias or  AV blocks. LIMB MOVEMENT SUMMARY:  There were a total of 90 episodes of limb movements,  65 of them were isolated and 25 were PLMs, for a limb movement index of 18.6,  with a limb movement arousal index of 3.7.      RESPIRATORY SUMMARY:

## 2022-10-10 DIAGNOSIS — I25.10 CORONARY ARTERY DISEASE INVOLVING NATIVE CORONARY ARTERY OF NATIVE HEART WITHOUT ANGINA PECTORIS: ICD-10-CM

## 2022-10-11 RX ORDER — CARVEDILOL 12.5 MG/1
TABLET ORAL
Qty: 60 TABLET | Refills: 5 | Status: SHIPPED | OUTPATIENT
Start: 2022-10-11

## 2022-10-13 ENCOUNTER — TELEPHONE (OUTPATIENT)
Dept: CARDIOTHORACIC SURGERY | Age: 67
End: 2022-10-13

## 2022-10-13 DIAGNOSIS — I10 ESSENTIAL HYPERTENSION: ICD-10-CM

## 2022-10-13 DIAGNOSIS — I25.10 CORONARY ARTERY DISEASE INVOLVING NATIVE CORONARY ARTERY OF NATIVE HEART WITHOUT ANGINA PECTORIS: ICD-10-CM

## 2022-10-13 DIAGNOSIS — E78.5 HYPERLIPIDEMIA, UNSPECIFIED HYPERLIPIDEMIA TYPE: ICD-10-CM

## 2022-10-13 NOTE — TELEPHONE ENCOUNTER
Patient called office requesting refill of cilostazol. Called patient and informed him that Dr. Rk Aguilar just recently filled prescription on 9/27/22 with 3 refills. He voiced understanding.

## 2022-10-14 RX ORDER — LISINOPRIL 5 MG/1
TABLET ORAL
Qty: 30 TABLET | Refills: 5 | Status: SHIPPED | OUTPATIENT
Start: 2022-10-14

## 2022-10-14 RX ORDER — CLOPIDOGREL BISULFATE 75 MG/1
75 TABLET ORAL DAILY
Qty: 30 TABLET | Refills: 5 | Status: SHIPPED | OUTPATIENT
Start: 2022-10-14

## 2022-10-14 RX ORDER — ATORVASTATIN CALCIUM 20 MG/1
20 TABLET, FILM COATED ORAL DAILY
Qty: 30 TABLET | Refills: 5 | Status: SHIPPED | OUTPATIENT
Start: 2022-10-14

## 2022-10-14 RX ORDER — FUROSEMIDE 40 MG/1
40 TABLET ORAL DAILY
Qty: 30 TABLET | Refills: 5 | Status: SHIPPED | OUTPATIENT
Start: 2022-10-14

## 2023-01-05 ENCOUNTER — TELEPHONE (OUTPATIENT)
Dept: CARDIOTHORACIC SURGERY | Age: 68
End: 2023-01-05

## 2023-01-05 NOTE — TELEPHONE ENCOUNTER
Patient no showed for arterial dup on 12/29/22 - called to reschedule including office visit with Dr. Ross aguirre.   LMOM

## 2023-02-15 DIAGNOSIS — I73.9 PAD (PERIPHERAL ARTERY DISEASE) (HCC): ICD-10-CM

## 2023-02-23 DIAGNOSIS — E78.5 HYPERLIPIDEMIA, UNSPECIFIED HYPERLIPIDEMIA TYPE: ICD-10-CM

## 2023-02-24 RX ORDER — ATORVASTATIN CALCIUM 20 MG/1
20 TABLET, FILM COATED ORAL DAILY
Qty: 90 TABLET | Refills: 1 | Status: SHIPPED | OUTPATIENT
Start: 2023-02-24

## 2023-03-02 ENCOUNTER — HOSPITAL ENCOUNTER (OUTPATIENT)
Dept: INTERVENTIONAL RADIOLOGY/VASCULAR | Age: 68
Discharge: HOME | End: 2023-03-02
Payer: MEDICARE

## 2023-03-02 DIAGNOSIS — I73.9 PAD (PERIPHERAL ARTERY DISEASE) (HCC): ICD-10-CM

## 2023-03-02 PROCEDURE — 93925 LOWER EXTREMITY STUDY: CPT

## 2023-03-06 DIAGNOSIS — I10 ESSENTIAL HYPERTENSION: ICD-10-CM

## 2023-03-06 RX ORDER — CILOSTAZOL 100 MG/1
TABLET ORAL
Qty: 60 TABLET | Refills: 3 | Status: SHIPPED | OUTPATIENT
Start: 2023-03-06

## 2023-03-08 RX ORDER — LISINOPRIL 5 MG/1
TABLET ORAL
Qty: 30 TABLET | Refills: 3 | Status: SHIPPED | OUTPATIENT
Start: 2023-03-08

## 2023-03-14 ENCOUNTER — OFFICE VISIT (OUTPATIENT)
Dept: CARDIOTHORACIC SURGERY | Age: 68
End: 2023-03-14
Payer: MEDICARE

## 2023-03-14 VITALS
HEART RATE: 90 BPM | BODY MASS INDEX: 34.1 KG/M2 | SYSTOLIC BLOOD PRESSURE: 170 MMHG | DIASTOLIC BLOOD PRESSURE: 92 MMHG | WEIGHT: 225 LBS | HEIGHT: 68 IN

## 2023-03-14 DIAGNOSIS — I73.9 PAD (PERIPHERAL ARTERY DISEASE) (HCC): Primary | ICD-10-CM

## 2023-03-14 PROCEDURE — 99215 OFFICE O/P EST HI 40 MIN: CPT | Performed by: THORACIC SURGERY (CARDIOTHORACIC VASCULAR SURGERY)

## 2023-03-14 PROCEDURE — G8484 FLU IMMUNIZE NO ADMIN: HCPCS | Performed by: THORACIC SURGERY (CARDIOTHORACIC VASCULAR SURGERY)

## 2023-03-14 PROCEDURE — 1123F ACP DISCUSS/DSCN MKR DOCD: CPT | Performed by: THORACIC SURGERY (CARDIOTHORACIC VASCULAR SURGERY)

## 2023-03-14 PROCEDURE — G8417 CALC BMI ABV UP PARAM F/U: HCPCS | Performed by: THORACIC SURGERY (CARDIOTHORACIC VASCULAR SURGERY)

## 2023-03-14 PROCEDURE — 3017F COLORECTAL CA SCREEN DOC REV: CPT | Performed by: THORACIC SURGERY (CARDIOTHORACIC VASCULAR SURGERY)

## 2023-03-14 PROCEDURE — 3080F DIAST BP >= 90 MM HG: CPT | Performed by: THORACIC SURGERY (CARDIOTHORACIC VASCULAR SURGERY)

## 2023-03-14 PROCEDURE — 3077F SYST BP >= 140 MM HG: CPT | Performed by: THORACIC SURGERY (CARDIOTHORACIC VASCULAR SURGERY)

## 2023-03-14 PROCEDURE — 4004F PT TOBACCO SCREEN RCVD TLK: CPT | Performed by: THORACIC SURGERY (CARDIOTHORACIC VASCULAR SURGERY)

## 2023-03-14 PROCEDURE — G8427 DOCREV CUR MEDS BY ELIG CLIN: HCPCS | Performed by: THORACIC SURGERY (CARDIOTHORACIC VASCULAR SURGERY)

## 2023-03-14 NOTE — PROGRESS NOTES
CT/CV Surgery Follow Up Office Visit      Patient's Name/Date of Birth: Evert Larios / 1955 (74 y.o.)    PCP: On File Not (Inactive)    Date: March 14, 2023    We had the pleasure of seeing Evert Larios in the office today, as you know this is a very pleasant 74y.o. year old male with a history of hypertension, hyperlipidemia, myocardial infarction, coronary artery angioplasty in 2013, DM2, COPD, chronic heavy smoking, and severe peripheral artery disease. He returns to the cardiovascular surgery clinic for follow-up. He reports slightly improved symptoms of peripheral arterial disease with current medications including Xarelto, Plavix, Pletal and aspirin. He states that he can walk 2 blocks without any claudication. Right side is worse than left side. He is a current everyday smoker 1 pack/day. He denies any recent episode of ischemic type of rest pain at night. He had an arterial Doppler study on 3/2/2023, which demonstrated decreased JOSE to 0.56-0.59 (Rt) and 0.60-0.63 (Lt). PastMedical History:  Naila Begum  has a past medical history of CAD (coronary artery disease), Chronic diastolic congestive heart failure ([facility] [address] Hyperlipidemia, Hypertension, MI (myocardial infarction) ([facility] [address] and Tobacco abuse. Past Surgical History:  The patient  has a past surgical history that includes Cardiac catheterization (3/13) and Coronary angioplasty with stent (10-1-13). Allergies: The patient has No Known Allergies.     Medications:    Current Outpatient Medications:     lisinopril (PRINIVIL;ZESTRIL) 5 MG tablet, take 1 tablet by mouth twice a day, Disp: 30 tablet, Rfl: 3    cilostazol (PLETAL) 100 MG tablet, take 1 tablet by mouth twice a day, Disp: 60 tablet, Rfl: 3    atorvastatin (LIPITOR) 20 MG tablet, Take 1 tablet by mouth daily, Disp: 90 tablet, Rfl: 1    rivaroxaban (XARELTO) 2.5 MG TABS tablet, Take 1 tablet by mouth 2 times daily, Disp: 180 tablet, Rfl: 0    clopidogrel (PLAVIX) 75 MG tablet, Take 1 tablet by mouth daily, Disp: 30 tablet, Rfl: 5    furosemide (LASIX) 40 MG tablet, Take 1 tablet by mouth daily, Disp: 30 tablet, Rfl: 5    carvedilol (COREG) 12.5 MG tablet, take 1 tablet by mouth twice a day with meals, Disp: 60 tablet, Rfl: 5    glipiZIDE (GLUCOTROL) 10 MG tablet, Take 1 tablet by mouth every morning (before breakfast), Disp: 60 tablet, Rfl: 3    albuterol sulfate HFA (VENTOLIN HFA) 108 (90 Base) MCG/ACT inhaler, Inhale 2 puffs into the lungs every 6 hours as needed for Wheezing or Shortness of Breath, Disp: 1 each, Rfl: 11    aspirin 81 MG chewable tablet, Take 1 tablet by mouth daily Indications: Cardiovascular Risk Reduction, blood thinner, Disp: 30 tablet, Rfl: 1    cilostazol (PLETAL) 50 MG tablet, take 1 tablet by mouth twice a day for 4 days, Disp: 8 tablet, Rfl: 0    cilostazol (PLETAL) 100 MG tablet, Take 1 tablet by mouth 2 times daily, Disp: 60 tablet, Rfl: 3    Family History: This patient's family history includes Diabetes in his mother; Heart Disease in his father; High Blood Pressure in his brother; Stroke in his mother. Social History:  Lo Kumar  reports that he has been smoking cigarettes. He has a 60.00 pack-year smoking history. He has never used smokeless tobacco. He reports current alcohol use. He reports that he does not use drugs. Vital Signs:   BP (!) 170/92 (Site: Right Upper Arm, Position: Sitting, Cuff Size: Medium Adult)   Pulse 90   Ht 5' 8\" (1.727 m)   Wt 225 lb (102.1 kg)   BMI 34.21 kg/m²     ROS:   Constitutional: Negative for activity change, chills, fatigue, fever and unexpected weight change. HEENT: Negative for symptoms. Respiratory: COPD. Cardiac: Negative for midsternal chest pain, arrhythmia, shortness of breath,  Vascular: 2 block claudication, right is worse than left  Gastrointestinal: Negative for hematochezia, melana, constipation, and N/V/D. Musculoskeletal: Negative for myalgias, amputation.   Skin: Negative for color change, rash and wound. Neurological: Negative for dizziness or syncope. Nephrology: Negative for chronic kidney disease,     Physical Exam:  General appearance:  No acute distress, appears stated age and cooperative. Neck: No jugular venous distention. Trachea midline. No carotid bruits. Chest Wall: No deformity, midsternal scar, enlarged palpable supraclavicular lymphnode. Respiratory: Markedly decreased breathing sound bilaterally. Cardiovascular:  Regular rate and rhythm with normal S1/S2 without murmurs, rubs or gallops. Abdomen: Soft, non-tender, non-distended with normal bowel sounds. Ext: Chronic ischemic changes of the lower extremity bilaterally. Skin: Shiny thin skin of lower extremity bilaterally. Neurologic:  Neurovascularly intact without any focal sensory/motor deficits. Peripheral Pulses: +1 palpable right femoral pulse. No palpable left femoral pulse. No pedal pulses bilaterally. Hand-held Doppler examination: Biphasic Doppler signal over PT bilaterally. Broad Doppler signal on left DP. No Doppler signal over right DP. Labs:    CBC:  Lab Results   Component Value Date/Time    WBC 17.6 11/06/2021 04:09 AM    HGB 13.9 11/06/2021 04:09 AM    HCT 40.5 11/06/2021 04:09 AM    MCV 97.1 11/06/2021 04:09 AM     11/06/2021 04:09 AM    INR 1.06 04/01/2013 03:38 AM     BMP:   Lab Results   Component Value Date/Time     09/20/2022 12:34 PM    K 4.6 09/20/2022 12:34 PM    K 4.1 11/03/2021 06:56 AM    CL 98 09/20/2022 12:34 PM    CO2 26 09/20/2022 12:34 PM    BUN 12 09/20/2022 12:34 PM    CREATININE 0.82 09/20/2022 12:34 PM    MG 1.9 09/20/2022 12:34 PM       Imaging: I have reviewed the arterial Doppler study and JOSE.       Problem List:  Patient Active Problem List   Diagnosis    NSTEMI (non-ST elevated myocardial infarction) (HCC)    Hyperglycemia    Smoker    Alcohol abuse    SOB (shortness of breath)    Sleep apnea, obstructive    Fatigue    Daytime somnolence    Depression Heart disease    Obesity (BMI 30.0-34. 9)    Presence of stent in LAD coronary artery stent    Essential hypertension    Coronary artery disease involving native coronary artery of native heart without angina pectoris    Pure hypercholesterolemia    Chronic diastolic congestive heart failure (HCC)    SOB (shortness of breath) on exertion    Ischemia of right lower extremity    Peripheral artery disease ([address]    Tobacco abuse    Ischemic cardiomyopathy EF improved from 30% 2013  to 55% now       Assessment: Severe peripheral arterial disease. 2 block claudication. Stable PAD with conservative medical treatment. Plan 3/14/23:  1) continue current medication including Plavix, Pletal, aspirin and Xarelto. 2) follow-up in 6 months. 3) stop smoking. I had another long discussion with the patient regarding the importance of cessation of smoking. Thank you for allowing us to be involved in the patient's care.     Electronically by Mila Kohler MD  on 3/14/2023 at 3:22 PM

## 2023-03-16 ENCOUNTER — HOSPITAL ENCOUNTER (OUTPATIENT)
Dept: CT IMAGING | Age: 68
Discharge: HOME | End: 2023-03-16
Payer: MEDICARE

## 2023-03-16 DIAGNOSIS — F17.200 SMOKER UNMOTIVATED TO QUIT: ICD-10-CM

## 2023-03-16 DIAGNOSIS — J44.9 CHRONIC OBSTRUCTIVE PULMONARY DISEASE, UNSPECIFIED COPD TYPE (HCC): ICD-10-CM

## 2023-03-16 DIAGNOSIS — R93.89 ABNORMAL SCREENING CT OF CHEST: ICD-10-CM

## 2023-03-16 PROCEDURE — 71250 CT THORAX DX C-: CPT

## 2023-05-01 DIAGNOSIS — I10 ESSENTIAL HYPERTENSION: ICD-10-CM

## 2023-05-01 DIAGNOSIS — I25.10 CORONARY ARTERY DISEASE INVOLVING NATIVE CORONARY ARTERY OF NATIVE HEART WITHOUT ANGINA PECTORIS: ICD-10-CM

## 2023-05-01 RX ORDER — CLOPIDOGREL BISULFATE 75 MG/1
75 TABLET ORAL DAILY
Qty: 90 TABLET | Refills: 1 | Status: SHIPPED | OUTPATIENT
Start: 2023-05-01

## 2023-05-01 RX ORDER — FUROSEMIDE 40 MG/1
40 TABLET ORAL DAILY
Qty: 90 TABLET | Refills: 1 | Status: SHIPPED | OUTPATIENT
Start: 2023-05-01

## 2023-05-30 DIAGNOSIS — I25.10 CORONARY ARTERY DISEASE INVOLVING NATIVE CORONARY ARTERY OF NATIVE HEART WITHOUT ANGINA PECTORIS: ICD-10-CM

## 2023-05-31 RX ORDER — CARVEDILOL 12.5 MG/1
12.5 TABLET ORAL 2 TIMES DAILY WITH MEALS
Qty: 180 TABLET | Refills: 1 | Status: SHIPPED | OUTPATIENT
Start: 2023-05-31

## 2023-08-21 DIAGNOSIS — E78.5 HYPERLIPIDEMIA, UNSPECIFIED HYPERLIPIDEMIA TYPE: ICD-10-CM

## 2023-08-23 RX ORDER — ATORVASTATIN CALCIUM 20 MG/1
20 TABLET, FILM COATED ORAL DAILY
Qty: 90 TABLET | Refills: 0 | Status: SHIPPED | OUTPATIENT
Start: 2023-08-23

## 2023-08-28 DIAGNOSIS — I73.9 PAD (PERIPHERAL ARTERY DISEASE) (HCC): ICD-10-CM

## 2023-09-12 ENCOUNTER — OFFICE VISIT (OUTPATIENT)
Dept: CARDIOTHORACIC SURGERY | Age: 68
End: 2023-09-12
Payer: MEDICARE

## 2023-09-12 VITALS
BODY MASS INDEX: 32.28 KG/M2 | HEART RATE: 90 BPM | HEIGHT: 68 IN | DIASTOLIC BLOOD PRESSURE: 86 MMHG | WEIGHT: 213 LBS | SYSTOLIC BLOOD PRESSURE: 152 MMHG

## 2023-09-12 DIAGNOSIS — I73.9 PAD (PERIPHERAL ARTERY DISEASE) (HCC): Primary | ICD-10-CM

## 2023-09-12 PROCEDURE — 3077F SYST BP >= 140 MM HG: CPT | Performed by: THORACIC SURGERY (CARDIOTHORACIC VASCULAR SURGERY)

## 2023-09-12 PROCEDURE — G8427 DOCREV CUR MEDS BY ELIG CLIN: HCPCS | Performed by: THORACIC SURGERY (CARDIOTHORACIC VASCULAR SURGERY)

## 2023-09-12 PROCEDURE — 99215 OFFICE O/P EST HI 40 MIN: CPT | Performed by: THORACIC SURGERY (CARDIOTHORACIC VASCULAR SURGERY)

## 2023-09-12 PROCEDURE — 3079F DIAST BP 80-89 MM HG: CPT | Performed by: THORACIC SURGERY (CARDIOTHORACIC VASCULAR SURGERY)

## 2023-09-12 PROCEDURE — 1123F ACP DISCUSS/DSCN MKR DOCD: CPT | Performed by: THORACIC SURGERY (CARDIOTHORACIC VASCULAR SURGERY)

## 2023-09-12 PROCEDURE — 3017F COLORECTAL CA SCREEN DOC REV: CPT | Performed by: THORACIC SURGERY (CARDIOTHORACIC VASCULAR SURGERY)

## 2023-09-12 PROCEDURE — 4004F PT TOBACCO SCREEN RCVD TLK: CPT | Performed by: THORACIC SURGERY (CARDIOTHORACIC VASCULAR SURGERY)

## 2023-09-12 PROCEDURE — G8417 CALC BMI ABV UP PARAM F/U: HCPCS | Performed by: THORACIC SURGERY (CARDIOTHORACIC VASCULAR SURGERY)

## 2023-09-12 NOTE — PATIENT INSTRUCTIONS
If you receive a survey asking about your care experience, please respond. Your answers will help ensure you receive high-quality care at this office. Thank you! Your Medical Assistant today: Mercedes Glaser. Thank you for coming to our office! It was a pleasure to serve you.

## 2023-09-12 NOTE — PROGRESS NOTES
CT/CV Surgery Follow Up Office Visit      Patient's Name/Date of Birth: Nelson Hogan / 1955 (79 y.o.)    PCP: On File Not (Inactive)    Date: September 12, 2023    We had the pleasure of seeing Nelson Hogan in the office today, as you know this is a very pleasant 79y.o. year old male with a history of hypertension, hyperlipidemia, myocardial infarction, coronary artery angioplasty in 2013, DM2, COPD, chronic heavy smoking, and severe peripheral arterial disease. He returned to the cardiovascular surgery clinic for follow-up. He reports 2 block claudication affecting both lower extremity. He states that there is no significant interval changes since the last visit 3 months ago. He denies any recent episode of ischemic type of rest pain in the lower extremity bilaterally. He has been on Xarelto, Plavix, Pletal, and aspirin. He admits that he still smokes 1 and half pack per day. He had an arterial Doppler study on 3/2/2023, which revealed decreased JOSE in lower extremities bilaterally (Rt 0.51-0.59, Lt 0.60-0.63). PastMedical History:  Stacie Szymanski  has a past medical history of CAD (coronary artery disease), Chronic diastolic congestive heart failure ([address]), Hyperlipidemia, Hypertension, MI (myocardial infarction) ([address]), and Tobacco abuse. Past Surgical History:  The patient  has a past surgical history that includes Cardiac catheterization (3/13) and Coronary angioplasty with stent (10-1-13). Allergies: The patient has No Known Allergies.     Medications:    Current Outpatient Medications:     rivaroxaban (XARELTO) 2.5 MG TABS tablet, Take 1 tablet by mouth 2 times daily, Disp: 180 tablet, Rfl: 0    atorvastatin (LIPITOR) 20 MG tablet, Take 1 tablet by mouth daily, Disp: 90 tablet, Rfl: 0    carvedilol (COREG) 12.5 MG tablet, Take 1 tablet by mouth 2 times daily (with meals), Disp: 180 tablet, Rfl: 1    furosemide (LASIX) 40 MG tablet, Take 1 tablet by mouth daily, Disp: 90 tablet, Rfl:

## 2023-09-18 DIAGNOSIS — I10 ESSENTIAL HYPERTENSION: ICD-10-CM

## 2023-09-18 RX ORDER — LISINOPRIL 5 MG/1
TABLET ORAL
Qty: 60 TABLET | Refills: 0 | Status: SHIPPED | OUTPATIENT
Start: 2023-09-18

## 2023-10-16 DIAGNOSIS — I25.10 CORONARY ARTERY DISEASE INVOLVING NATIVE CORONARY ARTERY OF NATIVE HEART WITHOUT ANGINA PECTORIS: ICD-10-CM

## 2023-10-16 DIAGNOSIS — I10 ESSENTIAL HYPERTENSION: ICD-10-CM

## 2023-10-16 RX ORDER — FUROSEMIDE 40 MG/1
40 TABLET ORAL DAILY
Qty: 90 TABLET | Refills: 0 | Status: SHIPPED | OUTPATIENT
Start: 2023-10-16

## 2023-10-16 RX ORDER — CLOPIDOGREL BISULFATE 75 MG/1
75 TABLET ORAL DAILY
Qty: 90 TABLET | Refills: 0 | Status: SHIPPED | OUTPATIENT
Start: 2023-10-16

## 2023-11-08 ENCOUNTER — HOSPITAL ENCOUNTER (OUTPATIENT)
Age: 68
Discharge: HOME | End: 2023-11-08
Payer: MEDICARE

## 2023-11-08 ENCOUNTER — OFFICE VISIT (OUTPATIENT)
Dept: CARDIOLOGY CLINIC | Age: 68
End: 2023-11-08
Payer: MEDICARE

## 2023-11-08 VITALS
WEIGHT: 214 LBS | HEIGHT: 69 IN | DIASTOLIC BLOOD PRESSURE: 86 MMHG | HEART RATE: 77 BPM | BODY MASS INDEX: 31.7 KG/M2 | SYSTOLIC BLOOD PRESSURE: 145 MMHG

## 2023-11-08 DIAGNOSIS — I73.9 PERIPHERAL ARTERY DISEASE (HCC): ICD-10-CM

## 2023-11-08 DIAGNOSIS — E78.00 PURE HYPERCHOLESTEROLEMIA: ICD-10-CM

## 2023-11-08 DIAGNOSIS — I50.32 CHRONIC DIASTOLIC CONGESTIVE HEART FAILURE (HCC): ICD-10-CM

## 2023-11-08 DIAGNOSIS — Z95.5 PRESENCE OF STENT IN LAD CORONARY ARTERY: ICD-10-CM

## 2023-11-08 DIAGNOSIS — I10 ESSENTIAL HYPERTENSION: ICD-10-CM

## 2023-11-08 DIAGNOSIS — R06.02 SOB (SHORTNESS OF BREATH) ON EXERTION: ICD-10-CM

## 2023-11-08 DIAGNOSIS — I25.10 CORONARY ARTERY DISEASE INVOLVING NATIVE CORONARY ARTERY OF NATIVE HEART WITHOUT ANGINA PECTORIS: ICD-10-CM

## 2023-11-08 DIAGNOSIS — I25.10 CORONARY ARTERY DISEASE INVOLVING NATIVE CORONARY ARTERY OF NATIVE HEART WITHOUT ANGINA PECTORIS: Primary | ICD-10-CM

## 2023-11-08 DIAGNOSIS — Z72.0 TOBACCO ABUSE: ICD-10-CM

## 2023-11-08 DIAGNOSIS — I25.5 ISCHEMIC CARDIOMYOPATHY: ICD-10-CM

## 2023-11-08 LAB
ALBUMIN SERPL BCG-MCNC: 4.1 G/DL (ref 3.5–5.1)
ALP SERPL-CCNC: 104 U/L (ref 38–126)
ALT SERPL W/O P-5'-P-CCNC: 26 U/L (ref 11–66)
ANION GAP SERPL CALC-SCNC: 12 MEQ/L (ref 8–16)
AST SERPL-CCNC: 21 U/L (ref 5–40)
BILIRUB CONJ SERPL-MCNC: < 0.2 MG/DL (ref 0–0.3)
BILIRUB SERPL-MCNC: 0.4 MG/DL (ref 0.3–1.2)
BUN SERPL-MCNC: 8 MG/DL (ref 7–22)
CALCIUM SERPL-MCNC: 9.4 MG/DL (ref 8.5–10.5)
CHLORIDE SERPL-SCNC: 94 MEQ/L (ref 98–111)
CHOLEST SERPL-MCNC: 120 MG/DL (ref 100–199)
CO2 SERPL-SCNC: 29 MEQ/L (ref 23–33)
CREAT SERPL-MCNC: 0.8 MG/DL (ref 0.4–1.2)
GFR SERPL CREATININE-BSD FRML MDRD: > 60 ML/MIN/1.73M2
GLUCOSE SERPL-MCNC: 357 MG/DL (ref 70–108)
HDLC SERPL-MCNC: 51 MG/DL
LDLC SERPL CALC-MCNC: 55 MG/DL
MAGNESIUM SERPL-MCNC: 2.1 MG/DL (ref 1.6–2.4)
POTASSIUM SERPL-SCNC: 5.1 MEQ/L (ref 3.5–5.2)
PROT SERPL-MCNC: 7.3 G/DL (ref 6.1–8)
SODIUM SERPL-SCNC: 135 MEQ/L (ref 135–145)
TRIGL SERPL-MCNC: 68 MG/DL (ref 0–199)

## 2023-11-08 PROCEDURE — 3079F DIAST BP 80-89 MM HG: CPT | Performed by: INTERNAL MEDICINE

## 2023-11-08 PROCEDURE — 4004F PT TOBACCO SCREEN RCVD TLK: CPT | Performed by: INTERNAL MEDICINE

## 2023-11-08 PROCEDURE — G8427 DOCREV CUR MEDS BY ELIG CLIN: HCPCS | Performed by: INTERNAL MEDICINE

## 2023-11-08 PROCEDURE — 80053 COMPREHEN METABOLIC PANEL: CPT

## 2023-11-08 PROCEDURE — 83735 ASSAY OF MAGNESIUM: CPT

## 2023-11-08 PROCEDURE — G8417 CALC BMI ABV UP PARAM F/U: HCPCS | Performed by: INTERNAL MEDICINE

## 2023-11-08 PROCEDURE — 1123F ACP DISCUSS/DSCN MKR DOCD: CPT | Performed by: INTERNAL MEDICINE

## 2023-11-08 PROCEDURE — 3017F COLORECTAL CA SCREEN DOC REV: CPT | Performed by: INTERNAL MEDICINE

## 2023-11-08 PROCEDURE — 82248 BILIRUBIN DIRECT: CPT

## 2023-11-08 PROCEDURE — G8484 FLU IMMUNIZE NO ADMIN: HCPCS | Performed by: INTERNAL MEDICINE

## 2023-11-08 PROCEDURE — 80061 LIPID PANEL: CPT

## 2023-11-08 PROCEDURE — 36415 COLL VENOUS BLD VENIPUNCTURE: CPT

## 2023-11-08 PROCEDURE — 3077F SYST BP >= 140 MM HG: CPT | Performed by: INTERNAL MEDICINE

## 2023-11-08 PROCEDURE — 99214 OFFICE O/P EST MOD 30 MIN: CPT | Performed by: INTERNAL MEDICINE

## 2023-11-08 RX ORDER — CILOSTAZOL 100 MG/1
100 TABLET ORAL 2 TIMES DAILY
Qty: 180 TABLET | Refills: 3 | Status: SHIPPED | OUTPATIENT
Start: 2023-11-08

## 2023-11-08 RX ORDER — LISINOPRIL 5 MG/1
TABLET ORAL
Qty: 180 TABLET | Refills: 3 | Status: SHIPPED | OUTPATIENT
Start: 2023-11-08

## 2023-11-08 RX ORDER — ASPIRIN 81 MG/1
81 TABLET, CHEWABLE ORAL DAILY
Qty: 90 TABLET | Refills: 3 | Status: SHIPPED | OUTPATIENT
Start: 2023-11-08

## 2023-11-08 RX ORDER — CARVEDILOL 12.5 MG/1
12.5 TABLET ORAL 2 TIMES DAILY WITH MEALS
Qty: 180 TABLET | Refills: 3 | Status: SHIPPED | OUTPATIENT
Start: 2023-11-08

## 2023-11-08 RX ORDER — CLOPIDOGREL BISULFATE 75 MG/1
75 TABLET ORAL DAILY
Qty: 90 TABLET | Refills: 3 | Status: SHIPPED | OUTPATIENT
Start: 2023-11-08

## 2023-11-08 RX ORDER — ATORVASTATIN CALCIUM 20 MG/1
20 TABLET, FILM COATED ORAL DAILY
Qty: 90 TABLET | Refills: 3 | Status: SHIPPED | OUTPATIENT
Start: 2023-11-08

## 2023-11-08 RX ORDER — FUROSEMIDE 40 MG/1
40 TABLET ORAL DAILY
Qty: 90 TABLET | Refills: 3 | Status: SHIPPED | OUTPATIENT
Start: 2023-11-08

## 2024-01-09 ENCOUNTER — OFFICE VISIT (OUTPATIENT)
Age: 69
End: 2024-01-09
Payer: MEDICARE

## 2024-01-09 VITALS
BODY MASS INDEX: 31.55 KG/M2 | HEIGHT: 69 IN | SYSTOLIC BLOOD PRESSURE: 129 MMHG | WEIGHT: 213 LBS | HEART RATE: 103 BPM | DIASTOLIC BLOOD PRESSURE: 72 MMHG

## 2024-01-09 DIAGNOSIS — I73.9 PAD (PERIPHERAL ARTERY DISEASE) (HCC): Primary | ICD-10-CM

## 2024-01-09 PROCEDURE — 99214 OFFICE O/P EST MOD 30 MIN: CPT | Performed by: THORACIC SURGERY (CARDIOTHORACIC VASCULAR SURGERY)

## 2024-01-09 PROCEDURE — 3078F DIAST BP <80 MM HG: CPT | Performed by: THORACIC SURGERY (CARDIOTHORACIC VASCULAR SURGERY)

## 2024-01-09 PROCEDURE — 3074F SYST BP LT 130 MM HG: CPT | Performed by: THORACIC SURGERY (CARDIOTHORACIC VASCULAR SURGERY)

## 2024-01-09 PROCEDURE — G8484 FLU IMMUNIZE NO ADMIN: HCPCS | Performed by: THORACIC SURGERY (CARDIOTHORACIC VASCULAR SURGERY)

## 2024-01-09 PROCEDURE — G8417 CALC BMI ABV UP PARAM F/U: HCPCS | Performed by: THORACIC SURGERY (CARDIOTHORACIC VASCULAR SURGERY)

## 2024-01-09 PROCEDURE — 4004F PT TOBACCO SCREEN RCVD TLK: CPT | Performed by: THORACIC SURGERY (CARDIOTHORACIC VASCULAR SURGERY)

## 2024-01-09 PROCEDURE — G8427 DOCREV CUR MEDS BY ELIG CLIN: HCPCS | Performed by: THORACIC SURGERY (CARDIOTHORACIC VASCULAR SURGERY)

## 2024-01-09 PROCEDURE — 1123F ACP DISCUSS/DSCN MKR DOCD: CPT | Performed by: THORACIC SURGERY (CARDIOTHORACIC VASCULAR SURGERY)

## 2024-01-09 PROCEDURE — 3017F COLORECTAL CA SCREEN DOC REV: CPT | Performed by: THORACIC SURGERY (CARDIOTHORACIC VASCULAR SURGERY)

## 2024-01-09 NOTE — PROGRESS NOTES
CT/CV Surgery Follow Up Office Visit      Patient's Name/Date of Birth: Tin Llanos / 1955 (68 y.o.)    PCP: Not, On File (Inactive)    Date: January 9, 2024    We had the pleasure of seeing Tin Llanos in the office today, as you know this is a very pleasant 68 y.o. year old male with a history of hypertension, hyperlipidemia, myocardial infarction, coronary angioplasty in 2013, DM2, COPD, everyday smoking, and severe peripheral arterial disease.  He returned to the cardiovascular surgery clinic for follow-up.    He reports that he cannot walk more than 2 blocks because of shortness of breath.  He denies any claudication up to 2 blocks of walking.  He denies any recent episode of ischemic type of rest pain, or tissue loss in the lower extremity.  He has been taking Xarelto, Plavix, Pletal and aspirin.  He admits that he still smokes more than 1 pack a day.    PastMedical History:  Tin  has a past medical history of CAD (coronary artery disease), Chronic diastolic congestive heart failure (HCC), Hyperlipidemia, Hypertension, MI (myocardial infarction) ([facility]), and Tobacco abuse.    Past Surgical History:  The patient  has a past surgical history that includes Cardiac catheterization (3/13) and Coronary angioplasty with stent (10-1-13).    Allergies:  The patient has No Known Allergies.    Medications:    Current Outpatient Medications:     furosemide (LASIX) 40 MG tablet, Take 1 tablet by mouth daily, Disp: 90 tablet, Rfl: 3    clopidogrel (PLAVIX) 75 MG tablet, Take 1 tablet by mouth daily, Disp: 90 tablet, Rfl: 3    lisinopril (PRINIVIL;ZESTRIL) 5 MG tablet, take 1 tablet by mouth twice a day, Disp: 180 tablet, Rfl: 3    rivaroxaban (XARELTO) 2.5 MG TABS tablet, Take 1 tablet by mouth 2 times daily, Disp: 180 tablet, Rfl: 3    atorvastatin (LIPITOR) 20 MG tablet, Take 1 tablet by mouth daily, Disp: 90 tablet, Rfl: 3    carvedilol (COREG) 12.5 MG tablet, Take 1 tablet by mouth 2 times daily (with

## 2024-01-09 NOTE — PATIENT INSTRUCTIONS
If you receive a survey asking about your care experience, please respond. Your answers will help ensure you receive high-quality care at this office. Thank you!    Your Medical Assistant today: Bayron DUNHAM  Thank you for coming to our office! It was a pleasure to serve you.

## 2024-05-15 ENCOUNTER — OFFICE VISIT (OUTPATIENT)
Dept: CARDIOLOGY CLINIC | Age: 69
End: 2024-05-15
Payer: MEDICARE

## 2024-05-15 VITALS
SYSTOLIC BLOOD PRESSURE: 158 MMHG | BODY MASS INDEX: 32.08 KG/M2 | HEART RATE: 81 BPM | WEIGHT: 216.6 LBS | DIASTOLIC BLOOD PRESSURE: 86 MMHG | HEIGHT: 69 IN

## 2024-05-15 DIAGNOSIS — Z95.5 PRESENCE OF STENT IN LAD CORONARY ARTERY: ICD-10-CM

## 2024-05-15 DIAGNOSIS — R06.02 SOB (SHORTNESS OF BREATH) ON EXERTION: ICD-10-CM

## 2024-05-15 DIAGNOSIS — I25.5 ISCHEMIC CARDIOMYOPATHY: ICD-10-CM

## 2024-05-15 DIAGNOSIS — I10 ESSENTIAL HYPERTENSION: ICD-10-CM

## 2024-05-15 DIAGNOSIS — E78.00 PURE HYPERCHOLESTEROLEMIA: ICD-10-CM

## 2024-05-15 DIAGNOSIS — Z72.0 TOBACCO ABUSE: ICD-10-CM

## 2024-05-15 DIAGNOSIS — I50.32 CHRONIC DIASTOLIC CONGESTIVE HEART FAILURE (HCC): ICD-10-CM

## 2024-05-15 DIAGNOSIS — I25.10 CORONARY ARTERY DISEASE INVOLVING NATIVE CORONARY ARTERY OF NATIVE HEART WITHOUT ANGINA PECTORIS: Primary | ICD-10-CM

## 2024-05-15 PROCEDURE — 93000 ELECTROCARDIOGRAM COMPLETE: CPT | Performed by: INTERNAL MEDICINE

## 2024-05-15 PROCEDURE — G8417 CALC BMI ABV UP PARAM F/U: HCPCS | Performed by: INTERNAL MEDICINE

## 2024-05-15 PROCEDURE — 99214 OFFICE O/P EST MOD 30 MIN: CPT | Performed by: INTERNAL MEDICINE

## 2024-05-15 PROCEDURE — 3077F SYST BP >= 140 MM HG: CPT | Performed by: INTERNAL MEDICINE

## 2024-05-15 PROCEDURE — 3079F DIAST BP 80-89 MM HG: CPT | Performed by: INTERNAL MEDICINE

## 2024-05-15 PROCEDURE — 1123F ACP DISCUSS/DSCN MKR DOCD: CPT | Performed by: INTERNAL MEDICINE

## 2024-05-15 PROCEDURE — 3017F COLORECTAL CA SCREEN DOC REV: CPT | Performed by: INTERNAL MEDICINE

## 2024-05-15 PROCEDURE — G8427 DOCREV CUR MEDS BY ELIG CLIN: HCPCS | Performed by: INTERNAL MEDICINE

## 2024-05-15 PROCEDURE — 4004F PT TOBACCO SCREEN RCVD TLK: CPT | Performed by: INTERNAL MEDICINE

## 2024-05-15 RX ORDER — LISINOPRIL 10 MG/1
10 TABLET ORAL DAILY
Qty: 90 TABLET | Refills: 3 | Status: SHIPPED | OUTPATIENT
Start: 2024-05-15

## 2024-05-15 NOTE — PROGRESS NOTES
Noncompliance with followup and medications totally.    Plan     The  current meds and labs reviewed    Patient Seen, Chart, Consults notes, Labs, Radiology studies reviewed.      Continue the current treatment and with constant vigilance to changes in symptoms and also any potential side effects.  Return for care or seek medical attention immediately if symptoms got worse and/or develop new symptoms.    Coronary artery disease, seems to be stable. Denies angina or change in breathing pattern  S/p angioplasty    Hypertension, on medical treatment. Seems to be under good control. Patient is compliant with medical treatment.   Cont coreg  Increase  lisinopril 10 mg po qd from 5      Hyperlipidemia: on statins, followed periodically. Patient need periodic lipid and liver profile.  Lipid panel and liver function test before next appointment    Congestive heart failure: no evidence of fluid overload today, no recent hospitalization for CHF  On lasix 40 mg po qd  Not on kcl and K 4.6 and 5.1      Cardiomyopathy: improving, no CHF symptoms, no change in clinical condition. Will need periodic echocardiograms depending on symptoms.  Improved to Ef 50% from 30%  omt    D/w the pat the plan of care    PAD under f/u with vascular surgeon on med RX  Intermittent caludication better with meds  On plavix .platel and xarelto    Need lab   prior to nexzt visit    Discussed use, benefit, and side effects of prescribed medications. All patient questions answered. Pt voiced understanding. Instructed to continue current medications, diet and exercise. Continue risk factor modification and medical management. Patient agreed with treatment plan. Follow up as directed.      The patient is advised to attempt to improve diet and exercise patterns to aid in medical management of this problem.  Advised more plant based nutrition/meditarrean diet   Advised patient to call office or seek immediate medical attention if there is any new onset of

## 2024-07-09 ENCOUNTER — HOSPITAL ENCOUNTER (OUTPATIENT)
Dept: INTERVENTIONAL RADIOLOGY/VASCULAR | Age: 69
Discharge: HOME | End: 2024-07-11
Attending: THORACIC SURGERY (CARDIOTHORACIC VASCULAR SURGERY)
Payer: MEDICARE

## 2024-07-09 DIAGNOSIS — I73.9 PAD (PERIPHERAL ARTERY DISEASE) (HCC): ICD-10-CM

## 2024-07-09 PROCEDURE — 93925 LOWER EXTREMITY STUDY: CPT

## 2024-07-16 ENCOUNTER — OFFICE VISIT (OUTPATIENT)
Age: 69
End: 2024-07-16
Payer: MEDICARE

## 2024-07-16 VITALS
BODY MASS INDEX: 32.32 KG/M2 | WEIGHT: 218.2 LBS | HEART RATE: 90 BPM | HEIGHT: 69 IN | DIASTOLIC BLOOD PRESSURE: 73 MMHG | SYSTOLIC BLOOD PRESSURE: 127 MMHG

## 2024-07-16 DIAGNOSIS — I73.9 PAD (PERIPHERAL ARTERY DISEASE) (HCC): Primary | ICD-10-CM

## 2024-07-16 PROCEDURE — 1123F ACP DISCUSS/DSCN MKR DOCD: CPT | Performed by: THORACIC SURGERY (CARDIOTHORACIC VASCULAR SURGERY)

## 2024-07-16 PROCEDURE — 4004F PT TOBACCO SCREEN RCVD TLK: CPT | Performed by: THORACIC SURGERY (CARDIOTHORACIC VASCULAR SURGERY)

## 2024-07-16 PROCEDURE — G8427 DOCREV CUR MEDS BY ELIG CLIN: HCPCS | Performed by: THORACIC SURGERY (CARDIOTHORACIC VASCULAR SURGERY)

## 2024-07-16 PROCEDURE — 3078F DIAST BP <80 MM HG: CPT | Performed by: THORACIC SURGERY (CARDIOTHORACIC VASCULAR SURGERY)

## 2024-07-16 PROCEDURE — 3074F SYST BP LT 130 MM HG: CPT | Performed by: THORACIC SURGERY (CARDIOTHORACIC VASCULAR SURGERY)

## 2024-07-16 PROCEDURE — 99214 OFFICE O/P EST MOD 30 MIN: CPT | Performed by: THORACIC SURGERY (CARDIOTHORACIC VASCULAR SURGERY)

## 2024-07-16 PROCEDURE — G8417 CALC BMI ABV UP PARAM F/U: HCPCS | Performed by: THORACIC SURGERY (CARDIOTHORACIC VASCULAR SURGERY)

## 2024-07-16 PROCEDURE — 3017F COLORECTAL CA SCREEN DOC REV: CPT | Performed by: THORACIC SURGERY (CARDIOTHORACIC VASCULAR SURGERY)

## 2024-07-16 NOTE — PROGRESS NOTES
CT/CV Surgery Follow Up Office Visit      Patient's Name/Date of Birth: Tin Llanos / 1955 (68 y.o.)    PCP: Not, On File (Inactive)    Date: July 16, 2024    We had the pleasure of seeing Tin Llanos in the office today, as you know this is a very pleasant 68 y.o. year old male with a history of hypertension, hyperlipidemia, myocardial infarction, coronary artery angioplasty in 2013, DM2, COPD, everyday smoking, and severe peripheral arterial disease.  He returned to the cardiovascular surgery clinic for follow-up.    He reports that he can walk more than 1-2 blocks without any claudication.  He denies any ischemic type of rest pain of the lower extremity at night.  He admits that he still smokes.  He has been on Xarelto, Plavix, and Pletal.    He had an arterial Doppler study on 7/10/2024, which demonstrated decreased JOSE of the lower extremity bilaterally.    PastMedical History:  Tin  has a past medical history of CAD (coronary artery disease), Chronic diastolic congestive heart failure (HCC), Hyperlipidemia, Hypertension, MI (myocardial infarction) ([facility]), and Tobacco abuse.    Past Surgical History:  The patient  has a past surgical history that includes Cardiac catheterization (3/13) and Coronary angioplasty with stent (10-1-13).    Allergies:  The patient has No Known Allergies.    Medications:    Current Outpatient Medications:     lisinopril (PRINIVIL;ZESTRIL) 10 MG tablet, Take 1 tablet by mouth daily, Disp: 90 tablet, Rfl: 3    furosemide (LASIX) 40 MG tablet, Take 1 tablet by mouth daily, Disp: 90 tablet, Rfl: 3    clopidogrel (PLAVIX) 75 MG tablet, Take 1 tablet by mouth daily, Disp: 90 tablet, Rfl: 3    rivaroxaban (XARELTO) 2.5 MG TABS tablet, Take 1 tablet by mouth 2 times daily, Disp: 180 tablet, Rfl: 3    atorvastatin (LIPITOR) 20 MG tablet, Take 1 tablet by mouth daily, Disp: 90 tablet, Rfl: 3    carvedilol (COREG) 12.5 MG tablet, Take 1 tablet by mouth 2 times daily (with

## 2024-07-16 NOTE — PATIENT INSTRUCTIONS
If you receive a survey asking about your care experience, please respond. Your answers will help ensure you receive high-quality care at this office. Thank you!    Your Medical Assistant today: Becky  Thank you for coming to our office! It was a pleasure to serve you.

## 2024-09-17 DIAGNOSIS — I25.10 CORONARY ARTERY DISEASE INVOLVING NATIVE CORONARY ARTERY OF NATIVE HEART WITHOUT ANGINA PECTORIS: ICD-10-CM

## 2024-09-17 DIAGNOSIS — I10 ESSENTIAL HYPERTENSION: ICD-10-CM

## 2024-09-19 RX ORDER — CARVEDILOL 12.5 MG/1
12.5 TABLET ORAL 2 TIMES DAILY WITH MEALS
Qty: 180 TABLET | Refills: 0 | Status: SHIPPED | OUTPATIENT
Start: 2024-09-19

## 2024-09-19 RX ORDER — CLOPIDOGREL BISULFATE 75 MG/1
75 TABLET ORAL DAILY
Qty: 90 TABLET | Refills: 0 | Status: SHIPPED | OUTPATIENT
Start: 2024-09-19

## 2024-09-19 RX ORDER — ATORVASTATIN CALCIUM 20 MG/1
20 TABLET, FILM COATED ORAL DAILY
Qty: 90 TABLET | Refills: 0 | Status: SHIPPED | OUTPATIENT
Start: 2024-09-19

## 2024-09-19 RX ORDER — FUROSEMIDE 40 MG
40 TABLET ORAL DAILY
Qty: 90 TABLET | Refills: 0 | Status: SHIPPED | OUTPATIENT
Start: 2024-09-19

## 2024-09-19 RX ORDER — CILOSTAZOL 100 MG/1
100 TABLET ORAL 2 TIMES DAILY
Qty: 180 TABLET | Refills: 0 | Status: SHIPPED | OUTPATIENT
Start: 2024-09-19

## 2024-09-19 RX ORDER — LISINOPRIL 10 MG/1
10 TABLET ORAL DAILY
Qty: 90 TABLET | Refills: 0 | Status: SHIPPED | OUTPATIENT
Start: 2024-09-19

## 2024-09-25 ENCOUNTER — TELEPHONE (OUTPATIENT)
Dept: CARDIOLOGY CLINIC | Age: 69
End: 2024-09-25

## 2024-11-18 ENCOUNTER — OFFICE VISIT (OUTPATIENT)
Dept: CARDIOLOGY CLINIC | Age: 69
End: 2024-11-18

## 2024-11-18 VITALS
HEIGHT: 69 IN | WEIGHT: 217 LBS | DIASTOLIC BLOOD PRESSURE: 79 MMHG | HEART RATE: 92 BPM | SYSTOLIC BLOOD PRESSURE: 139 MMHG | BODY MASS INDEX: 32.14 KG/M2

## 2024-11-18 DIAGNOSIS — E78.00 PURE HYPERCHOLESTEROLEMIA: ICD-10-CM

## 2024-11-18 DIAGNOSIS — I10 ESSENTIAL HYPERTENSION: ICD-10-CM

## 2024-11-18 DIAGNOSIS — Z72.0 TOBACCO ABUSE: ICD-10-CM

## 2024-11-18 DIAGNOSIS — I73.9 PERIPHERAL ARTERY DISEASE (HCC): ICD-10-CM

## 2024-11-18 DIAGNOSIS — I50.32 CHRONIC DIASTOLIC CONGESTIVE HEART FAILURE (HCC): ICD-10-CM

## 2024-11-18 DIAGNOSIS — I25.10 CORONARY ARTERY DISEASE INVOLVING NATIVE CORONARY ARTERY OF NATIVE HEART WITHOUT ANGINA PECTORIS: Primary | ICD-10-CM

## 2024-11-18 DIAGNOSIS — I25.5 ISCHEMIC CARDIOMYOPATHY: ICD-10-CM

## 2024-11-18 NOTE — PROGRESS NOTES
control. Patient is compliant with medical treatment.   Cont coreg 12.5 po bid  Cont   lisinopril 10 mg po qd      Hyperlipidemia: on statins, followed periodically. Patient need periodic lipid and liver profile.  Lipid panel and liver function test before next appointment    Congestive heart failure: no evidence of fluid overload today, no recent hospitalization for CHF  On lasix 40 mg po qd  Not on kcl and K 4.6 and 5.1    Cardiomyopathy: improving, no CHF symptoms, no change in clinical condition. Will need periodic echocardiograms depending on symptoms.  Improved to Ef 55% 2021 from 30% 2013  Need f/u echo    D/w the pat the plan of care    PAD under f/u with vascular surgeon on med RX  Intermittent caludication better with meds  On plavix .platel and xarelto    Discussed use, benefit, and side effects of prescribed medications. All patient questions answered. Pt voiced understanding. Instructed to continue current medications, diet and exercise. Continue risk factor modification and medical management. Patient agreed with treatment plan. Follow up as directed.      The patient is advised to attempt to improve diet and exercise patterns to aid in medical management of this problem.  Advised more plant based nutrition/meditarrean diet   Advised patient to call office or seek immediate medical attention if there is any new onset of  any chest pain, sob, palpitations, lightheadedness, dizziness, orthopnea, PND or pedal edema.   All medication side effects were discussed in details.       RTC in 6 months      Alexia Pacheco MD,MD,FACC

## 2024-11-20 DIAGNOSIS — I25.10 CORONARY ARTERY DISEASE INVOLVING NATIVE CORONARY ARTERY OF NATIVE HEART WITHOUT ANGINA PECTORIS: ICD-10-CM

## 2024-11-20 RX ORDER — CARVEDILOL 12.5 MG/1
12.5 TABLET ORAL 2 TIMES DAILY WITH MEALS
Qty: 180 TABLET | Refills: 3 | Status: SHIPPED | OUTPATIENT
Start: 2024-11-20

## 2024-11-20 RX ORDER — RIVAROXABAN 2.5 MG/1
2.5 TABLET, FILM COATED ORAL 2 TIMES DAILY
Qty: 180 TABLET | Refills: 3 | Status: SHIPPED | OUTPATIENT
Start: 2024-11-20

## 2024-11-23 DIAGNOSIS — I10 ESSENTIAL HYPERTENSION: ICD-10-CM

## 2024-11-23 DIAGNOSIS — I25.10 CORONARY ARTERY DISEASE INVOLVING NATIVE CORONARY ARTERY OF NATIVE HEART WITHOUT ANGINA PECTORIS: ICD-10-CM

## 2024-11-25 ENCOUNTER — HOSPITAL ENCOUNTER (OUTPATIENT)
Age: 69
Discharge: HOME | End: 2024-11-27
Attending: INTERNAL MEDICINE
Payer: MEDICARE

## 2024-11-25 DIAGNOSIS — I25.10 CORONARY ARTERY DISEASE INVOLVING NATIVE CORONARY ARTERY OF NATIVE HEART WITHOUT ANGINA PECTORIS: ICD-10-CM

## 2024-11-25 DIAGNOSIS — I50.32 CHRONIC DIASTOLIC CONGESTIVE HEART FAILURE (HCC): ICD-10-CM

## 2024-11-25 DIAGNOSIS — E78.00 PURE HYPERCHOLESTEROLEMIA: ICD-10-CM

## 2024-11-25 DIAGNOSIS — I10 ESSENTIAL HYPERTENSION: ICD-10-CM

## 2024-11-25 DIAGNOSIS — I73.9 PERIPHERAL ARTERY DISEASE (HCC): ICD-10-CM

## 2024-11-25 DIAGNOSIS — I25.5 ISCHEMIC CARDIOMYOPATHY: ICD-10-CM

## 2024-11-25 DIAGNOSIS — Z72.0 TOBACCO ABUSE: ICD-10-CM

## 2024-11-25 LAB
ECHO AV CUSP MM: 2.4 CM
ECHO AV PEAK GRADIENT: 6 MMHG
ECHO AV PEAK VELOCITY: 1.2 M/S
ECHO AV VELOCITY RATIO: 0.75
ECHO LA AREA 2C: 9.5 CM2
ECHO LA AREA 4C: 11.6 CM2
ECHO LA DIAMETER: 3.3 CM
ECHO LA MAJOR AXIS: 4.2 CM
ECHO LA MINOR AXIS: 3.9 CM
ECHO LA VOL BP: 23 ML (ref 18–58)
ECHO LA VOL MOD A2C: 19 ML (ref 18–58)
ECHO LA VOL MOD A4C: 25 ML (ref 18–58)
ECHO LV E' LATERAL VELOCITY: 7.9 CM/S
ECHO LV E' SEPTAL VELOCITY: 4.7 CM/S
ECHO LV EJECTION FRACTION BIPLANE: 58 % (ref 55–100)
ECHO LV FRACTIONAL SHORTENING: 25 % (ref 28–44)
ECHO LV INTERNAL DIMENSION DIASTOLIC: 4 CM (ref 4.2–5.9)
ECHO LV INTERNAL DIMENSION SYSTOLIC: 3 CM
ECHO LV IVSD: 0.9 CM (ref 0.6–1)
ECHO LV MASS 2D: 109.7 G (ref 88–224)
ECHO LV POSTERIOR WALL DIASTOLIC: 0.9 CM (ref 0.6–1)
ECHO LV RELATIVE WALL THICKNESS RATIO: 0.45
ECHO LVOT PEAK GRADIENT: 3 MMHG
ECHO LVOT PEAK VELOCITY: 0.9 M/S
ECHO MV A VELOCITY: 1.15 M/S
ECHO MV E DECELERATION TIME (DT): 313 MS
ECHO MV E VELOCITY: 0.98 M/S
ECHO MV E/A RATIO: 0.85
ECHO MV E/E' LATERAL: 12.41
ECHO MV E/E' RATIO (AVERAGED): 16.63
ECHO MV E/E' SEPTAL: 20.85
ECHO PV MAX VELOCITY: 0.8 M/S
ECHO PV PEAK GRADIENT: 3 MMHG
ECHO RV INTERNAL DIMENSION: 2.8 CM
ECHO RV TAPSE: 2.2 CM (ref 1.7–?)
ECHO TV E WAVE: 0.8 M/S

## 2024-11-25 PROCEDURE — 93306 TTE W/DOPPLER COMPLETE: CPT | Performed by: INTERNAL MEDICINE

## 2024-11-25 PROCEDURE — 93306 TTE W/DOPPLER COMPLETE: CPT

## 2024-11-25 RX ORDER — FUROSEMIDE 40 MG/1
40 TABLET ORAL DAILY
Qty: 90 TABLET | Refills: 2 | Status: SHIPPED | OUTPATIENT
Start: 2024-11-25

## 2024-11-25 RX ORDER — LISINOPRIL 10 MG/1
10 TABLET ORAL DAILY
Qty: 90 TABLET | Refills: 2 | Status: SHIPPED | OUTPATIENT
Start: 2024-11-25

## 2024-11-25 RX ORDER — ATORVASTATIN CALCIUM 20 MG/1
20 TABLET, FILM COATED ORAL DAILY
Qty: 90 TABLET | Refills: 2 | Status: SHIPPED | OUTPATIENT
Start: 2024-11-25

## 2024-11-25 RX ORDER — CLOPIDOGREL BISULFATE 75 MG/1
75 TABLET ORAL DAILY
Qty: 90 TABLET | Refills: 2 | Status: SHIPPED | OUTPATIENT
Start: 2024-11-25

## 2024-11-27 RX ORDER — CILOSTAZOL 100 MG/1
100 TABLET ORAL 2 TIMES DAILY
Qty: 180 TABLET | Refills: 0 | Status: SHIPPED | OUTPATIENT
Start: 2024-11-27

## 2024-12-10 ENCOUNTER — TELEPHONE (OUTPATIENT)
Dept: CARDIOLOGY CLINIC | Age: 69
End: 2024-12-10

## 2024-12-10 NOTE — TELEPHONE ENCOUNTER
[facility] sent fax wondering if patient should be on all 3 meds, Xarelto 2.5 MG BID, Cilostazol 100 MG BID and Plavix 75 MG daily.   Please advise  Paper to sign in Dr. Tara gonzalez

## 2024-12-12 NOTE — TELEPHONE ENCOUNTER
CAD with   PAD  Xarelto 2.5 and cilostazol for pad  Need plavix or asa - for cad pat is on plavix and continue  Cont the current RX and done being well aware why it was done( not overlooked)

## 2025-02-03 ENCOUNTER — OFFICE VISIT (OUTPATIENT)
Age: 70
End: 2025-02-03
Payer: MEDICARE

## 2025-02-03 VITALS
HEIGHT: 69 IN | WEIGHT: 219 LBS | HEART RATE: 94 BPM | DIASTOLIC BLOOD PRESSURE: 63 MMHG | SYSTOLIC BLOOD PRESSURE: 136 MMHG | BODY MASS INDEX: 32.44 KG/M2

## 2025-02-03 DIAGNOSIS — I70.211 ATHEROSCLEROSIS OF NATIVE ARTERIES OF EXTREMITIES WITH INTERMITTENT CLAUDICATION, RIGHT LEG (HCC): ICD-10-CM

## 2025-02-03 DIAGNOSIS — I73.9 PVD (PERIPHERAL VASCULAR DISEASE) WITH CLAUDICATION (HCC): Primary | ICD-10-CM

## 2025-02-03 PROCEDURE — 3078F DIAST BP <80 MM HG: CPT | Performed by: PHYSICIAN ASSISTANT

## 2025-02-03 PROCEDURE — 1160F RVW MEDS BY RX/DR IN RCRD: CPT | Performed by: PHYSICIAN ASSISTANT

## 2025-02-03 PROCEDURE — G8417 CALC BMI ABV UP PARAM F/U: HCPCS | Performed by: PHYSICIAN ASSISTANT

## 2025-02-03 PROCEDURE — 4004F PT TOBACCO SCREEN RCVD TLK: CPT | Performed by: PHYSICIAN ASSISTANT

## 2025-02-03 PROCEDURE — 1123F ACP DISCUSS/DSCN MKR DOCD: CPT | Performed by: PHYSICIAN ASSISTANT

## 2025-02-03 PROCEDURE — 99214 OFFICE O/P EST MOD 30 MIN: CPT | Performed by: PHYSICIAN ASSISTANT

## 2025-02-03 PROCEDURE — 3017F COLORECTAL CA SCREEN DOC REV: CPT | Performed by: PHYSICIAN ASSISTANT

## 2025-02-03 PROCEDURE — G8427 DOCREV CUR MEDS BY ELIG CLIN: HCPCS | Performed by: PHYSICIAN ASSISTANT

## 2025-02-03 PROCEDURE — 3075F SYST BP GE 130 - 139MM HG: CPT | Performed by: PHYSICIAN ASSISTANT

## 2025-02-03 PROCEDURE — 1159F MED LIST DOCD IN RCRD: CPT | Performed by: PHYSICIAN ASSISTANT

## 2025-02-03 NOTE — PATIENT INSTRUCTIONS
If you receive a survey asking about your care experience, please respond. Your answers will help ensure you receive high-quality care at this office. Thank you!    Your Medical Assistant today: Beth CLARKE  Thank you for coming to our office! It was a pleasure to serve you.

## 2025-02-03 NOTE — PROGRESS NOTES
CT/CV Surgery Follow Up Office Visit      Patient's Name/Date of Birth: Tin Llanos / 1955 (69 y.o.)    PCP: Not, On File (Inactive)    CC:   Chief Complaint   Patient presents with    Follow-up     Date: February 3, 2025    We had the pleasure of seeing Tin Llanos in the office today, as you know this is a very pleasant 69 y.o. year old male with a history of hypertension, hyperlipidemia, myocardial infarction, coronary artery angioplasty in 2013, DM2, COPD, everyday smoking, and severe peripheral arterial disease.  He returned to the cardiovascular surgery clinic for follow-up.    He reports that he can walk 1-2 blocks with mild right>left calf claudication, but will need to stop walking due to SOB prior to onset of any claudication since being on Xarelto.  He denies any ischemic type of rest pain of the lower extremity at night.  He admits that he still smokes around 1ppd currently.  He has been on Xarelto, Plavix, and Pletal.    CTA Abd Aorta with bilat run-off: 11/3/21  1. 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  2. Diminutive flow in the tibial and peroneal arteries bilaterally with attenuated 3 vessel runoff on the right and 2 vessel runoff on the left.  3. Moderate areas of stenosis in the iliac and common femoral arteries.  4. 5 mm noncalcified nodule in the right lower lobe. Consider follow-up CT in one year per Fleischner criteria.    JOSE: 7/9/24  1. Total occlusion proximal to mid left SFA with reconstitution of flow  distally. These findings are unchanged from prior study.  2. Total occlusion distal right SFA with reconstitution distally. Note that the  proximal and mid right SFA are patent at this time whereas on the prior study  are totally occluded. Findings of the right leg are otherwise not appreciably  changed from prior study.    PastMedical

## 2025-02-10 RX ORDER — CILOSTAZOL 100 MG/1
100 TABLET ORAL 2 TIMES DAILY
Qty: 180 TABLET | Refills: 0 | Status: SHIPPED | OUTPATIENT
Start: 2025-02-10

## 2025-04-14 RX ORDER — CILOSTAZOL 100 MG/1
100 TABLET ORAL 2 TIMES DAILY
Qty: 180 TABLET | Refills: 3 | Status: SHIPPED | OUTPATIENT
Start: 2025-04-14

## 2025-05-08 DIAGNOSIS — I25.10 CORONARY ARTERY DISEASE INVOLVING NATIVE CORONARY ARTERY OF NATIVE HEART WITHOUT ANGINA PECTORIS: ICD-10-CM

## 2025-05-08 DIAGNOSIS — I10 ESSENTIAL HYPERTENSION: ICD-10-CM

## 2025-05-09 RX ORDER — CLOPIDOGREL BISULFATE 75 MG/1
75 TABLET ORAL DAILY
Qty: 90 TABLET | Refills: 0 | Status: SHIPPED | OUTPATIENT
Start: 2025-05-09

## 2025-05-09 RX ORDER — FUROSEMIDE 40 MG/1
40 TABLET ORAL DAILY
Qty: 90 TABLET | Refills: 0 | Status: SHIPPED | OUTPATIENT
Start: 2025-05-09

## 2025-05-09 RX ORDER — LISINOPRIL 10 MG/1
10 TABLET ORAL DAILY
Qty: 90 TABLET | Refills: 0 | Status: SHIPPED | OUTPATIENT
Start: 2025-05-09

## 2025-05-09 RX ORDER — ATORVASTATIN CALCIUM 20 MG/1
20 TABLET, FILM COATED ORAL DAILY
Qty: 90 TABLET | Refills: 0 | Status: SHIPPED | OUTPATIENT
Start: 2025-05-09

## 2025-05-19 ENCOUNTER — OFFICE VISIT (OUTPATIENT)
Dept: CARDIOLOGY CLINIC | Age: 70
End: 2025-05-19
Payer: MEDICARE

## 2025-05-19 VITALS
HEIGHT: 69 IN | DIASTOLIC BLOOD PRESSURE: 77 MMHG | BODY MASS INDEX: 31.46 KG/M2 | SYSTOLIC BLOOD PRESSURE: 135 MMHG | WEIGHT: 212.4 LBS | HEART RATE: 101 BPM

## 2025-05-19 DIAGNOSIS — I10 ESSENTIAL HYPERTENSION: ICD-10-CM

## 2025-05-19 DIAGNOSIS — I73.9 PERIPHERAL ARTERY DISEASE: ICD-10-CM

## 2025-05-19 DIAGNOSIS — I25.10 CORONARY ARTERY DISEASE INVOLVING NATIVE CORONARY ARTERY OF NATIVE HEART WITHOUT ANGINA PECTORIS: Primary | ICD-10-CM

## 2025-05-19 DIAGNOSIS — I25.5 ISCHEMIC CARDIOMYOPATHY: ICD-10-CM

## 2025-05-19 DIAGNOSIS — Z72.0 TOBACCO ABUSE: ICD-10-CM

## 2025-05-19 DIAGNOSIS — E78.00 PURE HYPERCHOLESTEROLEMIA: ICD-10-CM

## 2025-05-19 DIAGNOSIS — R06.02 SOB (SHORTNESS OF BREATH) ON EXERTION: ICD-10-CM

## 2025-05-19 DIAGNOSIS — I50.32 CHRONIC DIASTOLIC CONGESTIVE HEART FAILURE (HCC): ICD-10-CM

## 2025-05-19 PROCEDURE — G8427 DOCREV CUR MEDS BY ELIG CLIN: HCPCS | Performed by: INTERNAL MEDICINE

## 2025-05-19 PROCEDURE — 1160F RVW MEDS BY RX/DR IN RCRD: CPT | Performed by: INTERNAL MEDICINE

## 2025-05-19 PROCEDURE — 1159F MED LIST DOCD IN RCRD: CPT | Performed by: INTERNAL MEDICINE

## 2025-05-19 PROCEDURE — 1123F ACP DISCUSS/DSCN MKR DOCD: CPT | Performed by: INTERNAL MEDICINE

## 2025-05-19 PROCEDURE — 3078F DIAST BP <80 MM HG: CPT | Performed by: INTERNAL MEDICINE

## 2025-05-19 PROCEDURE — 93000 ELECTROCARDIOGRAM COMPLETE: CPT | Performed by: INTERNAL MEDICINE

## 2025-05-19 PROCEDURE — G8417 CALC BMI ABV UP PARAM F/U: HCPCS | Performed by: INTERNAL MEDICINE

## 2025-05-19 PROCEDURE — 3075F SYST BP GE 130 - 139MM HG: CPT | Performed by: INTERNAL MEDICINE

## 2025-05-19 PROCEDURE — 4004F PT TOBACCO SCREEN RCVD TLK: CPT | Performed by: INTERNAL MEDICINE

## 2025-05-19 PROCEDURE — 3017F COLORECTAL CA SCREEN DOC REV: CPT | Performed by: INTERNAL MEDICINE

## 2025-05-19 PROCEDURE — 99214 OFFICE O/P EST MOD 30 MIN: CPT | Performed by: INTERNAL MEDICINE

## 2025-05-19 NOTE — PROGRESS NOTES
Lost to F/u  from  Nov 2018  to 08/2022    Former [facility] patient - 6 month follow up CAD with hx of stent    Hx of copd    6 month follow up.    No wt gain    Sob on exertion chronic - stable    Denied cp, dizziness, edema or palpitations    No Intermittent caludication    Reviewed the records      Patient Active Problem List   Diagnosis    NSTEMI (non-ST elevated myocardial infarction) (HCC)    Alcohol abuse    Sleep apnea, obstructive    Fatigue    Daytime somnolence    Depression    Obesity (BMI 30.0-34.9)    Presence of stent in LAD coronary artery stent    Essential hypertension    Coronary artery disease involving native coronary artery of native heart without angina pectoris    Pure hypercholesterolemia    Chronic diastolic congestive heart failure (HCC)    SOB (shortness of breath) on exertion    Ischemia of right lower extremity    Peripheral artery disease    Tobacco abuse    Ischemic cardiomyopathy EF improved from 30% 2013  to 55% now       Past Surgical History:   Procedure Laterality Date    CARDIAC CATHETERIZATION  03/01/2013    [facility]    CATARACT REMOVAL Bilateral 2024    Dr. Ashby    CORONARY ANGIOPLASTY WITH STENT PLACEMENT  10/01/2013       No Known Allergies     Family History   Problem Relation Age of Onset    Diabetes Mother     Stroke Mother     Heart Disease Father     High Blood Pressure Brother         Social History     Socioeconomic History    Marital status: Single     Spouse name: Not on file    Number of children: Not on file    Years of education: Not on file    Highest education level: Not on file   Occupational History    Not on file   Tobacco Use    Smoking status: Every Day     Current packs/day: 1.50     Average packs/day: 1.5 packs/day for 40.0 years (60.0 ttl pk-yrs)     Types: Cigarettes    Smokeless tobacco: Never   Vaping Use    Vaping status: Never Used   Substance and Sexual Activity    Alcohol use: Yes     Alcohol/week: 35.0 standard drinks of alcohol     Types: 35 Cans of  How Severe Is Your Skin Lesion?: mild Has Your Skin Lesion Been Treated?: not been treated Is This A New Presentation, Or A Follow-Up?: Skin Lesion Additional History: Patient denies bleeding mole.

## 2025-07-07 DIAGNOSIS — I10 ESSENTIAL HYPERTENSION: ICD-10-CM

## 2025-07-07 DIAGNOSIS — I25.10 CORONARY ARTERY DISEASE INVOLVING NATIVE CORONARY ARTERY OF NATIVE HEART WITHOUT ANGINA PECTORIS: ICD-10-CM

## 2025-07-08 RX ORDER — ATORVASTATIN CALCIUM 20 MG/1
20 TABLET, FILM COATED ORAL DAILY
Qty: 90 TABLET | Refills: 1 | Status: SHIPPED | OUTPATIENT
Start: 2025-07-08 | End: 2025-07-11 | Stop reason: SDUPTHER

## 2025-07-08 RX ORDER — LISINOPRIL 10 MG/1
10 TABLET ORAL DAILY
Qty: 90 TABLET | Refills: 1 | Status: SHIPPED | OUTPATIENT
Start: 2025-07-08 | End: 2025-07-11 | Stop reason: SDUPTHER

## 2025-07-08 RX ORDER — FUROSEMIDE 40 MG/1
40 TABLET ORAL DAILY
Qty: 90 TABLET | Refills: 1 | Status: SHIPPED | OUTPATIENT
Start: 2025-07-08 | End: 2025-07-11 | Stop reason: SDUPTHER

## 2025-07-08 RX ORDER — CLOPIDOGREL BISULFATE 75 MG/1
75 TABLET ORAL DAILY
Qty: 90 TABLET | Refills: 1 | Status: SHIPPED | OUTPATIENT
Start: 2025-07-08 | End: 2025-07-11 | Stop reason: SDUPTHER

## 2025-07-11 DIAGNOSIS — I10 ESSENTIAL HYPERTENSION: ICD-10-CM

## 2025-07-11 DIAGNOSIS — I25.10 CORONARY ARTERY DISEASE INVOLVING NATIVE CORONARY ARTERY OF NATIVE HEART WITHOUT ANGINA PECTORIS: ICD-10-CM

## 2025-07-11 RX ORDER — RIVAROXABAN 2.5 MG/1
2.5 TABLET, FILM COATED ORAL 2 TIMES DAILY
Qty: 180 TABLET | Refills: 1 | Status: SHIPPED | OUTPATIENT
Start: 2025-07-11

## 2025-07-11 RX ORDER — LISINOPRIL 10 MG/1
10 TABLET ORAL DAILY
Qty: 90 TABLET | Refills: 1 | Status: SHIPPED | OUTPATIENT
Start: 2025-07-11

## 2025-07-11 RX ORDER — CILOSTAZOL 100 MG/1
100 TABLET ORAL 2 TIMES DAILY
Qty: 180 TABLET | Refills: 1 | Status: SHIPPED | OUTPATIENT
Start: 2025-07-11

## 2025-07-11 RX ORDER — ATORVASTATIN CALCIUM 20 MG/1
20 TABLET, FILM COATED ORAL DAILY
Qty: 90 TABLET | Refills: 1 | Status: SHIPPED | OUTPATIENT
Start: 2025-07-11

## 2025-07-11 RX ORDER — FUROSEMIDE 40 MG/1
40 TABLET ORAL DAILY
Qty: 90 TABLET | Refills: 1 | Status: SHIPPED | OUTPATIENT
Start: 2025-07-11

## 2025-07-11 RX ORDER — CLOPIDOGREL BISULFATE 75 MG/1
75 TABLET ORAL DAILY
Qty: 90 TABLET | Refills: 1 | Status: SHIPPED | OUTPATIENT
Start: 2025-07-11

## 2025-07-11 RX ORDER — CARVEDILOL 12.5 MG/1
12.5 TABLET ORAL 2 TIMES DAILY WITH MEALS
Qty: 180 TABLET | Refills: 1 | Status: SHIPPED | OUTPATIENT
Start: 2025-07-11

## 2025-07-25 ENCOUNTER — OFFICE VISIT (OUTPATIENT)
Dept: FAMILY MEDICINE CLINIC | Age: 70
End: 2025-07-25

## 2025-07-25 VITALS
BODY MASS INDEX: 31.9 KG/M2 | RESPIRATION RATE: 18 BRPM | HEART RATE: 88 BPM | WEIGHT: 215.4 LBS | DIASTOLIC BLOOD PRESSURE: 64 MMHG | HEIGHT: 69 IN | SYSTOLIC BLOOD PRESSURE: 134 MMHG

## 2025-07-25 DIAGNOSIS — Z12.5 SCREENING FOR MALIGNANT NEOPLASM OF PROSTATE: ICD-10-CM

## 2025-07-25 DIAGNOSIS — J41.0 SIMPLE CHRONIC BRONCHITIS (HCC): ICD-10-CM

## 2025-07-25 DIAGNOSIS — E11.9 TYPE 2 DIABETES MELLITUS TREATED WITHOUT INSULIN (HCC): Primary | ICD-10-CM

## 2025-07-25 DIAGNOSIS — Z72.0 TOBACCO ABUSE: ICD-10-CM

## 2025-07-25 DIAGNOSIS — I10 ESSENTIAL HYPERTENSION: ICD-10-CM

## 2025-07-25 DIAGNOSIS — E78.00 PURE HYPERCHOLESTEROLEMIA: ICD-10-CM

## 2025-07-25 LAB
CHP ED QC CHECK: ABNORMAL
GLUCOSE BLD-MCNC: 238 MG/DL
HBA1C MFR BLD: 8.6 %

## 2025-07-25 RX ORDER — METFORMIN HYDROCHLORIDE 500 MG/1
1000 TABLET, EXTENDED RELEASE ORAL
Qty: 180 TABLET | Refills: 1 | Status: SHIPPED | OUTPATIENT
Start: 2025-07-25

## 2025-07-25 RX ORDER — BUDESONIDE, GLYCOPYRROLATE, AND FORMOTEROL FUMARATE 160; 9; 4.8 UG/1; UG/1; UG/1
2 AEROSOL, METERED RESPIRATORY (INHALATION) 2 TIMES DAILY
Qty: 1 EACH | Refills: 11 | Status: SHIPPED | OUTPATIENT
Start: 2025-07-25

## 2025-07-25 SDOH — ECONOMIC STABILITY: FOOD INSECURITY: WITHIN THE PAST 12 MONTHS, YOU WORRIED THAT YOUR FOOD WOULD RUN OUT BEFORE YOU GOT MONEY TO BUY MORE.: NEVER TRUE

## 2025-07-25 SDOH — ECONOMIC STABILITY: FOOD INSECURITY: WITHIN THE PAST 12 MONTHS, THE FOOD YOU BOUGHT JUST DIDN'T LAST AND YOU DIDN'T HAVE MONEY TO GET MORE.: NEVER TRUE

## 2025-07-25 ASSESSMENT — PATIENT HEALTH QUESTIONNAIRE - PHQ9
SUM OF ALL RESPONSES TO PHQ QUESTIONS 1-9: 0
3. TROUBLE FALLING OR STAYING ASLEEP: NOT AT ALL
1. LITTLE INTEREST OR PLEASURE IN DOING THINGS: NOT AT ALL
8. MOVING OR SPEAKING SO SLOWLY THAT OTHER PEOPLE COULD HAVE NOTICED. OR THE OPPOSITE, BEING SO FIGETY OR RESTLESS THAT YOU HAVE BEEN MOVING AROUND A LOT MORE THAN USUAL: NOT AT ALL
SUM OF ALL RESPONSES TO PHQ QUESTIONS 1-9: 0
10. IF YOU CHECKED OFF ANY PROBLEMS, HOW DIFFICULT HAVE THESE PROBLEMS MADE IT FOR YOU TO DO YOUR WORK, TAKE CARE OF THINGS AT HOME, OR GET ALONG WITH OTHER PEOPLE: NOT DIFFICULT AT ALL
9. THOUGHTS THAT YOU WOULD BE BETTER OFF DEAD, OR OF HURTING YOURSELF: NOT AT ALL
5. POOR APPETITE OR OVEREATING: NOT AT ALL
SUM OF ALL RESPONSES TO PHQ QUESTIONS 1-9: 0
6. FEELING BAD ABOUT YOURSELF - OR THAT YOU ARE A FAILURE OR HAVE LET YOURSELF OR YOUR FAMILY DOWN: NOT AT ALL
SUM OF ALL RESPONSES TO PHQ QUESTIONS 1-9: 0
7. TROUBLE CONCENTRATING ON THINGS, SUCH AS READING THE NEWSPAPER OR WATCHING TELEVISION: NOT AT ALL
2. FEELING DOWN, DEPRESSED OR HOPELESS: NOT AT ALL
4. FEELING TIRED OR HAVING LITTLE ENERGY: NOT AT ALL

## 2025-07-25 ASSESSMENT — ENCOUNTER SYMPTOMS
SHORTNESS OF BREATH: 0
CONSTIPATION: 0

## 2025-07-25 NOTE — PATIENT INSTRUCTIONS
Work at reducing the bread, potatoes noodles and sweets.  Try to stop smoking.  Do the blood and urine test soon.  See me in a month

## 2025-07-28 ENCOUNTER — TELEPHONE (OUTPATIENT)
Dept: CARDIOLOGY CLINIC | Age: 70
End: 2025-07-28

## 2025-07-28 PROBLEM — I25.10 CORONARY ARTERY DISEASE INVOLVING NATIVE CORONARY ARTERY OF NATIVE HEART WITHOUT ANGINA PECTORIS: Chronic | Status: ACTIVE | Noted: 2018-03-19

## 2025-07-28 PROBLEM — I99.8 ISCHEMIA OF RIGHT LOWER EXTREMITY: Chronic | Status: ACTIVE | Noted: 2021-11-02

## 2025-07-30 NOTE — TELEPHONE ENCOUNTER
Spoke with patient, questions answered.  He needs to verify his  time with [facility] for his upcoming vascular testing.  Called vascular and spoke to Sary. She is going to check into this for patient.

## 2025-08-01 ENCOUNTER — HOSPITAL ENCOUNTER (OUTPATIENT)
Dept: CT IMAGING | Age: 70
Discharge: HOME | End: 2025-08-01
Attending: FAMILY MEDICINE
Payer: MEDICARE

## 2025-08-01 DIAGNOSIS — Z72.0 TOBACCO ABUSE: ICD-10-CM

## 2025-08-01 PROCEDURE — 71271 CT THORAX LUNG CANCER SCR C-: CPT

## 2025-08-04 ENCOUNTER — HOSPITAL ENCOUNTER (OUTPATIENT)
Dept: CT IMAGING | Age: 70
Discharge: HOME | End: 2025-08-04
Payer: MEDICARE

## 2025-08-04 ENCOUNTER — HOSPITAL ENCOUNTER (OUTPATIENT)
Dept: OTHER | Age: 70
Discharge: HOME | End: 2025-08-04
Payer: MEDICARE

## 2025-08-04 DIAGNOSIS — E78.00 PURE HYPERCHOLESTEROLEMIA: ICD-10-CM

## 2025-08-04 DIAGNOSIS — I73.9 PVD (PERIPHERAL VASCULAR DISEASE) WITH CLAUDICATION: ICD-10-CM

## 2025-08-04 DIAGNOSIS — R06.02 SOB (SHORTNESS OF BREATH) ON EXERTION: ICD-10-CM

## 2025-08-04 DIAGNOSIS — I25.5 ISCHEMIC CARDIOMYOPATHY: ICD-10-CM

## 2025-08-04 DIAGNOSIS — E11.9 TYPE 2 DIABETES MELLITUS TREATED WITHOUT INSULIN (HCC): ICD-10-CM

## 2025-08-04 DIAGNOSIS — I10 ESSENTIAL HYPERTENSION: ICD-10-CM

## 2025-08-04 DIAGNOSIS — Z72.0 TOBACCO ABUSE: ICD-10-CM

## 2025-08-04 DIAGNOSIS — I50.32 CHRONIC DIASTOLIC CONGESTIVE HEART FAILURE (HCC): ICD-10-CM

## 2025-08-04 DIAGNOSIS — I73.9 PERIPHERAL ARTERY DISEASE: ICD-10-CM

## 2025-08-04 DIAGNOSIS — Z12.5 SCREENING FOR MALIGNANT NEOPLASM OF PROSTATE: ICD-10-CM

## 2025-08-04 DIAGNOSIS — I25.10 CORONARY ARTERY DISEASE INVOLVING NATIVE CORONARY ARTERY OF NATIVE HEART WITHOUT ANGINA PECTORIS: ICD-10-CM

## 2025-08-04 DIAGNOSIS — I70.211 ATHEROSCLEROSIS OF NATIVE ARTERIES OF EXTREMITIES WITH INTERMITTENT CLAUDICATION, RIGHT LEG: ICD-10-CM

## 2025-08-04 LAB
ALBUMIN SERPL BCG-MCNC: 3.8 G/DL (ref 3.4–4.9)
ALP SERPL-CCNC: 70 U/L (ref 40–129)
ALT SERPL W/O P-5'-P-CCNC: 20 U/L (ref 10–50)
ANION GAP SERPL CALC-SCNC: 11 MEQ/L (ref 8–16)
AST SERPL-CCNC: 21 U/L (ref 10–50)
BILIRUB CONJ SERPL-MCNC: 0.4 MG/DL (ref 0–0.2)
BILIRUB SERPL-MCNC: 0.8 MG/DL (ref 0.3–1.2)
BUN SERPL-MCNC: 9 MG/DL (ref 8–23)
CALCIUM SERPL-MCNC: 8.8 MG/DL (ref 8.8–10.2)
CHLORIDE SERPL-SCNC: 95 MEQ/L (ref 98–111)
CHOLEST SERPL-MCNC: 89 MG/DL (ref 100–199)
CO2 SERPL-SCNC: 26 MEQ/L (ref 22–29)
CREAT SERPL-MCNC: 0.8 MG/DL (ref 0.7–1.2)
CREAT UR-MCNC: 70.5 MG/DL
GFR SERPL CREATININE-BSD FRML MDRD: > 90 ML/MIN/1.73M2
GLUCOSE SERPL-MCNC: 191 MG/DL (ref 74–109)
HDLC SERPL-MCNC: 41 MG/DL
LDLC SERPL CALC-MCNC: 37 MG/DL
MAGNESIUM SERPL-MCNC: 1.7 MG/DL (ref 1.6–2.6)
MICROALBUMIN UR-MCNC: < 2 MG/DL
MICROALBUMIN/CREAT RATIO PNL UR: 28 MG/G (ref 0–30)
POC CREATININE WHOLE BLOOD: 0.9 MG/DL (ref 0.5–1.2)
POTASSIUM SERPL-SCNC: 3.4 MEQ/L (ref 3.5–5.2)
PROT SERPL-MCNC: 6.7 G/DL (ref 6.4–8.3)
PSA SERPL-MCNC: 2.58 NG/ML (ref 0–1)
SODIUM SERPL-SCNC: 132 MEQ/L (ref 135–145)
TRIGL SERPL-MCNC: 53 MG/DL (ref 0–199)

## 2025-08-04 PROCEDURE — 36415 COLL VENOUS BLD VENIPUNCTURE: CPT

## 2025-08-04 PROCEDURE — 80061 LIPID PANEL: CPT

## 2025-08-04 PROCEDURE — 80053 COMPREHEN METABOLIC PANEL: CPT

## 2025-08-04 PROCEDURE — 82565 ASSAY OF CREATININE: CPT

## 2025-08-04 PROCEDURE — 82043 UR ALBUMIN QUANTITATIVE: CPT

## 2025-08-04 PROCEDURE — G0103 PSA SCREENING: HCPCS

## 2025-08-04 PROCEDURE — 6360000004 HC RX CONTRAST MEDICATION: Performed by: PHYSICIAN ASSISTANT

## 2025-08-04 PROCEDURE — 82570 ASSAY OF URINE CREATININE: CPT

## 2025-08-04 PROCEDURE — 83735 ASSAY OF MAGNESIUM: CPT

## 2025-08-04 PROCEDURE — 82248 BILIRUBIN DIRECT: CPT

## 2025-08-04 PROCEDURE — 75635 CT ANGIO ABDOMINAL ARTERIES: CPT

## 2025-08-04 RX ORDER — IOPAMIDOL 755 MG/ML
120 INJECTION, SOLUTION INTRAVASCULAR
Status: COMPLETED | OUTPATIENT
Start: 2025-08-04 | End: 2025-08-04

## 2025-08-04 RX ADMIN — IOPAMIDOL 120 ML: 755 INJECTION, SOLUTION INTRAVENOUS at 13:05

## 2025-08-08 ENCOUNTER — RESULTS FOLLOW-UP (OUTPATIENT)
Dept: FAMILY MEDICINE CLINIC | Age: 70
End: 2025-08-08

## 2025-08-11 RX ORDER — RIVAROXABAN 2.5 MG/1
2.5 TABLET, FILM COATED ORAL 2 TIMES DAILY
Qty: 180 TABLET | Refills: 1 | Status: SHIPPED | OUTPATIENT
Start: 2025-08-11

## 2025-08-13 ENCOUNTER — OFFICE VISIT (OUTPATIENT)
Age: 70
End: 2025-08-13
Payer: MEDICARE

## 2025-08-13 VITALS
WEIGHT: 217 LBS | HEIGHT: 69 IN | HEART RATE: 82 BPM | DIASTOLIC BLOOD PRESSURE: 63 MMHG | BODY MASS INDEX: 32.14 KG/M2 | SYSTOLIC BLOOD PRESSURE: 134 MMHG

## 2025-08-13 DIAGNOSIS — I73.9 PVD (PERIPHERAL VASCULAR DISEASE) WITH CLAUDICATION: Primary | ICD-10-CM

## 2025-08-13 DIAGNOSIS — I70.213 ATHEROSCLEROSIS OF NATIVE ARTERIES OF EXTREMITIES WITH INTERMITTENT CLAUDICATION, BILATERAL LEGS: ICD-10-CM

## 2025-08-13 PROCEDURE — 3075F SYST BP GE 130 - 139MM HG: CPT | Performed by: SURGERY

## 2025-08-13 PROCEDURE — 1123F ACP DISCUSS/DSCN MKR DOCD: CPT | Performed by: SURGERY

## 2025-08-13 PROCEDURE — 99214 OFFICE O/P EST MOD 30 MIN: CPT | Performed by: SURGERY

## 2025-08-13 PROCEDURE — 3078F DIAST BP <80 MM HG: CPT | Performed by: SURGERY

## 2025-09-04 ENCOUNTER — OFFICE VISIT (OUTPATIENT)
Dept: FAMILY MEDICINE CLINIC | Age: 70
End: 2025-09-04

## 2025-09-04 VITALS
SYSTOLIC BLOOD PRESSURE: 142 MMHG | WEIGHT: 216.13 LBS | HEIGHT: 69 IN | DIASTOLIC BLOOD PRESSURE: 88 MMHG | RESPIRATION RATE: 18 BRPM | HEART RATE: 98 BPM | BODY MASS INDEX: 32.01 KG/M2

## 2025-09-04 DIAGNOSIS — E11.9 TYPE 2 DIABETES MELLITUS TREATED WITHOUT INSULIN (HCC): Primary | ICD-10-CM

## 2025-09-04 DIAGNOSIS — I10 ESSENTIAL HYPERTENSION: ICD-10-CM

## 2025-09-04 LAB
CHP ED QC CHECK: ABNORMAL
GLUCOSE BLD-MCNC: 136 MG/DL

## 2025-09-04 ASSESSMENT — ENCOUNTER SYMPTOMS
SINUS PRESSURE: 0
CONSTIPATION: 0
SHORTNESS OF BREATH: 0